# Patient Record
Sex: FEMALE | Race: WHITE | NOT HISPANIC OR LATINO | Employment: OTHER | URBAN - METROPOLITAN AREA
[De-identification: names, ages, dates, MRNs, and addresses within clinical notes are randomized per-mention and may not be internally consistent; named-entity substitution may affect disease eponyms.]

---

## 2017-01-18 ENCOUNTER — OFFICE VISIT (OUTPATIENT)
Dept: INTERNAL MEDICINE | Facility: CLINIC | Age: 72
End: 2017-01-18
Payer: MEDICARE

## 2017-01-18 ENCOUNTER — LAB VISIT (OUTPATIENT)
Dept: LAB | Facility: HOSPITAL | Age: 72
End: 2017-01-18
Attending: FAMILY MEDICINE
Payer: MEDICARE

## 2017-01-18 VITALS
BODY MASS INDEX: 23.92 KG/M2 | TEMPERATURE: 98 F | WEIGHT: 141.56 LBS | DIASTOLIC BLOOD PRESSURE: 84 MMHG | SYSTOLIC BLOOD PRESSURE: 138 MMHG | HEART RATE: 74 BPM

## 2017-01-18 DIAGNOSIS — M79.2 NEUROPATHIC PAIN: ICD-10-CM

## 2017-01-18 DIAGNOSIS — Z12.11 COLON CANCER SCREENING: ICD-10-CM

## 2017-01-18 DIAGNOSIS — I10 ESSENTIAL HYPERTENSION: ICD-10-CM

## 2017-01-18 DIAGNOSIS — E55.9 VITAMIN D DEFICIENCY: ICD-10-CM

## 2017-01-18 DIAGNOSIS — E78.5 DYSLIPIDEMIA: Primary | ICD-10-CM

## 2017-01-18 DIAGNOSIS — M81.0 OSTEOPOROSIS: ICD-10-CM

## 2017-01-18 DIAGNOSIS — E03.4 HYPOTHYROIDISM DUE TO ACQUIRED ATROPHY OF THYROID: ICD-10-CM

## 2017-01-18 DIAGNOSIS — E78.5 DYSLIPIDEMIA: ICD-10-CM

## 2017-01-18 LAB
ALBUMIN SERPL BCP-MCNC: 4.1 G/DL
ALP SERPL-CCNC: 67 U/L
ALT SERPL W/O P-5'-P-CCNC: 23 U/L
ANION GAP SERPL CALC-SCNC: 8 MMOL/L
AST SERPL-CCNC: 34 U/L
BASOPHILS # BLD AUTO: 0.02 K/UL
BASOPHILS NFR BLD: 0.3 %
BILIRUB SERPL-MCNC: 0.4 MG/DL
BUN SERPL-MCNC: 12 MG/DL
CALCIUM SERPL-MCNC: 10.2 MG/DL
CHLORIDE SERPL-SCNC: 104 MMOL/L
CHOLEST/HDLC SERPL: 2.7 {RATIO}
CO2 SERPL-SCNC: 30 MMOL/L
CREAT SERPL-MCNC: 0.9 MG/DL
DIFFERENTIAL METHOD: NORMAL
EOSINOPHIL # BLD AUTO: 0.2 K/UL
EOSINOPHIL NFR BLD: 1.9 %
ERYTHROCYTE [DISTWIDTH] IN BLOOD BY AUTOMATED COUNT: 13.1 %
EST. GFR  (AFRICAN AMERICAN): >60 ML/MIN/1.73 M^2
EST. GFR  (NON AFRICAN AMERICAN): >60 ML/MIN/1.73 M^2
GLUCOSE SERPL-MCNC: 79 MG/DL
HCT VFR BLD AUTO: 45 %
HDL/CHOLESTEROL RATIO: 36.5 %
HDLC SERPL-MCNC: 208 MG/DL
HDLC SERPL-MCNC: 76 MG/DL
HGB BLD-MCNC: 14.8 G/DL
LDLC SERPL CALC-MCNC: 111.8 MG/DL
LYMPHOCYTES # BLD AUTO: 2.3 K/UL
LYMPHOCYTES NFR BLD: 29.3 %
MCH RBC QN AUTO: 30.8 PG
MCHC RBC AUTO-ENTMCNC: 32.9 %
MCV RBC AUTO: 94 FL
MONOCYTES # BLD AUTO: 0.7 K/UL
MONOCYTES NFR BLD: 8.2 %
NEUTROPHILS # BLD AUTO: 4.7 K/UL
NEUTROPHILS NFR BLD: 60 %
NONHDLC SERPL-MCNC: 132 MG/DL
PLATELET # BLD AUTO: 251 K/UL
PMV BLD AUTO: 10.8 FL
POTASSIUM SERPL-SCNC: 3.9 MMOL/L
PROT SERPL-MCNC: 8 G/DL
RBC # BLD AUTO: 4.8 M/UL
SODIUM SERPL-SCNC: 142 MMOL/L
T4 FREE SERPL-MCNC: 1.16 NG/DL
TRIGL SERPL-MCNC: 101 MG/DL
TSH SERPL DL<=0.005 MIU/L-ACNC: 2.5 UIU/ML
WBC # BLD AUTO: 7.88 K/UL

## 2017-01-18 PROCEDURE — 80061 LIPID PANEL: CPT

## 2017-01-18 PROCEDURE — 84439 ASSAY OF FREE THYROXINE: CPT

## 2017-01-18 PROCEDURE — 99214 OFFICE O/P EST MOD 30 MIN: CPT | Mod: S$PBB,,, | Performed by: FAMILY MEDICINE

## 2017-01-18 PROCEDURE — 36415 COLL VENOUS BLD VENIPUNCTURE: CPT | Mod: PO

## 2017-01-18 PROCEDURE — 84443 ASSAY THYROID STIM HORMONE: CPT

## 2017-01-18 PROCEDURE — 85025 COMPLETE CBC W/AUTO DIFF WBC: CPT

## 2017-01-18 PROCEDURE — 80053 COMPREHEN METABOLIC PANEL: CPT

## 2017-01-18 PROCEDURE — 99999 PR PBB SHADOW E&M-EST. PATIENT-LVL III: CPT | Mod: PBBFAC,,, | Performed by: FAMILY MEDICINE

## 2017-01-18 RX ORDER — GABAPENTIN 100 MG/1
100 CAPSULE ORAL 3 TIMES DAILY
Qty: 90 CAPSULE | Refills: 0 | Status: SHIPPED | OUTPATIENT
Start: 2017-01-18 | End: 2018-01-31

## 2017-01-18 RX ORDER — METOPROLOL SUCCINATE 25 MG/1
12.5-25 TABLET, EXTENDED RELEASE ORAL DAILY
Qty: 90 TABLET | Refills: 3 | Status: SHIPPED | OUTPATIENT
Start: 2017-01-18 | End: 2018-08-01 | Stop reason: SDUPTHER

## 2017-01-18 RX ORDER — ALENDRONATE SODIUM 70 MG/1
70 TABLET ORAL
Qty: 12 TABLET | Refills: 2 | Status: SHIPPED | OUTPATIENT
Start: 2017-01-18 | End: 2018-01-31

## 2017-01-18 RX ORDER — PRAVASTATIN SODIUM 40 MG/1
40 TABLET ORAL DAILY
Qty: 90 TABLET | Refills: 3 | Status: SHIPPED | OUTPATIENT
Start: 2017-01-18 | End: 2017-07-19 | Stop reason: SDUPTHER

## 2017-01-18 NOTE — PROGRESS NOTES
Subjective:      Patient ID: Iliana Mariano is a 71 y.o. female.    Chief Complaint: Follow-up (chronic issues)    HPI Comments: Patient is here today for followup of multiple issues.    htn- stable with meds. On triamterene hctz 37.5-50 mg.  She has no problems with it.     Patient also has a history of hypothyroidism.  She is currently on 88 mcg.  Labs reviewed. Stable.     She did have an episode of herpes zoster back in September.  Still with some residual pain on the left C6 distribution.  Not currently taking anything at this time.  Is wanting to get shingles vaccine once his been a year since the episode outbreak. Interested in trying gabapentin for painful flares after sewing.     She has a history of vitamin D deficiency.  She's currently on over-the-counter supplementation.  Her insurance won't allow us to check it more than once per year.      Hyperlipidemia-currently controlled with statin.  Willing to do blood work today.    Balance isn't as good with turning quick. Working on this. Staying active.               Lab Results   Component Value Date    WBC 7.88 01/18/2017    HGB 14.8 01/18/2017    HCT 45.0 01/18/2017     01/18/2017    CHOL 208 (H) 01/18/2017    TRIG 101 01/18/2017    HDL 76 (H) 01/18/2017    ALT 23 01/18/2017    AST 34 01/18/2017     01/18/2017    K 3.9 01/18/2017     01/18/2017    CREATININE 0.9 01/18/2017    BUN 12 01/18/2017    CO2 30 (H) 01/18/2017    TSH 2.500 01/18/2017       Review of Systems   Constitutional: Negative for chills, fatigue and fever.   HENT: Negative for ear pain and trouble swallowing.    Eyes: Negative for pain and visual disturbance.   Respiratory: Negative for cough and shortness of breath.    Cardiovascular: Negative for chest pain and leg swelling.   Gastrointestinal: Negative for abdominal pain, blood in stool, nausea and vomiting.   Endocrine: Negative for cold intolerance and heat intolerance.   Genitourinary: Negative for dysuria and  frequency.   Musculoskeletal: Positive for arthralgias. Negative for joint swelling, myalgias and neck pain.   Skin: Negative for color change and rash.   Neurological: Negative for dizziness, weakness, light-headedness and headaches.   Psychiatric/Behavioral: Negative for behavioral problems and sleep disturbance. The patient is not nervous/anxious.      Objective:     Physical Exam   Constitutional: She is oriented to person, place, and time. She appears well-developed and well-nourished.   HENT:   Head: Normocephalic and atraumatic.   Right Ear: External ear normal.   Left Ear: External ear normal.   Mouth/Throat: Oropharynx is clear and moist.   Eyes: EOM are normal.   Neck: Normal range of motion. Neck supple. No thyromegaly present.   Cardiovascular: Normal rate and regular rhythm.  Exam reveals no gallop and no friction rub.    No murmur heard.  Pulmonary/Chest: Effort normal. No respiratory distress. She has no wheezes. She has no rales.   Abdominal: Soft. Bowel sounds are normal. She exhibits no distension. There is no tenderness. There is no rebound.   Musculoskeletal: Normal range of motion. She exhibits no edema.   Lymphadenopathy:     She has no cervical adenopathy.   Neurological: She is alert and oriented to person, place, and time.   Skin: Skin is warm and dry. No rash noted.   Psychiatric: She has a normal mood and affect. Her behavior is normal. Judgment and thought content normal.   Vitals reviewed.    Assessment:     1. Dyslipidemia    2. Hypothyroidism due to acquired atrophy of thyroid    3. Vitamin D deficiency    4. Essential hypertension    5. Osteoporosis    6. Colon cancer screening    7. Neuropathic pain      Plan:   Iliana was seen today for follow-up.    Diagnoses and all orders for this visit:    Dyslipidemia- - labs ordered. Discussed Health Maintenance issues.     -     pravastatin (PRAVACHOL) 40 MG tablet; Take 1 tablet (40 mg total) by mouth once daily.  -     TSH; Future  -      T4, free; Future  -     Lipid panel; Future  -     Comprehensive metabolic panel; Future  -     CBC auto differential; Future    Hypothyroidism due to acquired atrophy of thyroid- - stable, Continue with current medications and interventions. Labs reviewed.     -     TSH; Future  -     T4, free; Future  -     Lipid panel; Future  -     Comprehensive metabolic panel; Future  -     CBC auto differential; Future    Vitamin D deficiency- stable, Continue with current medications and interventions. Labs reviewed.   -     TSH; Future  -     T4, free; Future  -     Lipid panel; Future  -     Comprehensive metabolic panel; Future  -     CBC auto differential; Future    Essential hypertension- stable, Continue with current medications and interventions. Labs reviewed.   -     TSH; Future  -     T4, free; Future  -     Lipid panel; Future  -     Comprehensive metabolic panel; Future  -     CBC auto differential; Future    Osteoporosis- stable, Continue with current medications and interventions. Cont with activity. .   -     alendronate (FOSAMAX) 70 MG tablet; Take 1 tablet (70 mg total) by mouth every 7 days.  -     TSH; Future  -     T4, free; Future  -     Lipid panel; Future  -     Comprehensive metabolic panel; Future  -     CBC auto differential; Future    Colon cancer screening- schedule  -     Case request GI: COLONOSCOPY  -     TSH; Future  -     T4, free; Future  -     Lipid panel; Future  -     Comprehensive metabolic panel; Future  -     CBC auto differential; Future    Neuropathic pain  Comments:  in C6 distribution. ok for prn gabapentin.   Orders:  -     TSH; Future  -     T4, free; Future  -     Lipid panel; Future  -     Comprehensive metabolic panel; Future  -     CBC auto differential; Future    Other orders  -     Influenza - Quadrivalent (3 years & older)  -     metoprolol succinate (TOPROL-XL) 25 MG 24 hr tablet; Take 0.5-1 tablets (12.5-25 mg total) by mouth once daily. 1/2 to 1 tab daily  -      gabapentin (NEURONTIN) 100 MG capsule; Take 1 capsule (100 mg total) by mouth 3 (three) times daily.            Return in about 6 months (around 7/18/2017) for chronic issues.

## 2017-01-18 NOTE — MR AVS SNAPSHOT
[facility]  [address]  [place] [address]  Phone: [phone]  Fax: [phone]                  Iliana Mariano   2017 11:20 AM   Office Visit    Description:  Female : 1945   Provider:  Breonna Borja MD   [facility]           Reason for Visit     Follow-up           Diagnoses this Visit        Comments    Dyslipidemia    -  Primary     Hypothyroidism due to acquired atrophy of thyroid         Vitamin D deficiency         Essential hypertension         Osteoporosis         Colon cancer screening         Neuropathic pain     in C6 distribution. ok for prn gabapentin.            To Do List           Future Appointments        Provider Department Dept Phone    2017 1:40 PM LABORATORY, [place] [facility] [place] [phone]    2017 10:20 AM Breonna Borja MD [facility] [phone]      Goals (5 Years of Data)     None      Follow-Up and Disposition     Return in about 6 months (around 2017) for chronic issues.       These Medications        Disp Refills Start End    alendronate (FOSAMAX) 70 MG tablet 12 tablet 2 2017    Take 1 tablet (70 mg total) by mouth every 7 days. - Oral    Pharmacy: [facility] [address] [place] [address] Ph #: [phone]       metoprolol succinate (TOPROL-XL) 25 MG 24 hr tablet 90 tablet 3 2017     Take 0.5-1 tablets (12.5-25 mg total) by mouth once daily. 1/2 to 1 tab daily - Oral    Pharmacy: [facility] [address] [place] [address] Ph #: [phone]       pravastatin (PRAVACHOL) 40 MG tablet 90 tablet 3 2017     Take 1 tablet (40 mg total) by mouth once daily. - Oral    Pharmacy: [facility] [address] [place] [address] Ph #: [phone]       gabapentin (NEURONTIN) 100 MG capsule 90 capsule 0 2017    Take 1 capsule (100 mg total) by mouth 3 (three)  times daily. - Oral    Pharmacy: [facility] - [place], [place] - [address] [facility] #: [phone]         [facility] On Call     [facility] On Call Nurse Care Line - 24/7 Assistance  Registered nurses in the [facility] On Call Center provide clinical advisement, health education, appointment booking, and other advisory services.  Call for this free service at [phone].             Medications           Message regarding Medications     Verify the changes and/or additions to your medication regime listed below are the same as discussed with your clinician today.  If any of these changes or additions are incorrect, please notify your healthcare provider.        START taking these NEW medications        Refills    gabapentin (NEURONTIN) 100 MG capsule 0    Sig: Take 1 capsule (100 mg total) by mouth 3 (three) times daily.    Class: Normal    Route: Oral      STOP taking these medications     fluticasone (FLONASE) 50 mcg/actuation nasal spray 1 spray by Each Nare route once daily.           Verify that the below list of medications is an accurate representation of the medications you are currently taking.  If none reported, the list may be blank. If incorrect, please contact your healthcare provider. Carry this list with you in case of emergency.           Current Medications     alendronate (FOSAMAX) 70 MG tablet Take 1 tablet (70 mg total) by mouth every 7 days.    aspirin (ECOTRIN) 81 MG EC tablet Take 81 mg by mouth once daily.    B-complex with vitamin C (Z-BEC OR EQUIV) tablet Take 1 tablet by mouth once daily.    diclofenac sodium 1 % Gel Apply 2 g topically once daily.    dorzolamide (TRUSOPT) 2 % ophthalmic solution     dorzolamide-timolol 2-0.5% (COSOPT) 2-0.5 % ophthalmic solution Place 1 drop into both eyes every 12 (twelve) hours.    ergocalciferol, vitamin D2, 1,000 unit Cap Take 1,000 Units by mouth.    latanoprost 0.005 % ophthalmic solution Place 1 drop into both eyes every evening.     levothyroxine (SYNTHROID) 88 MCG tablet Take 1 tablet (88 mcg total) by mouth once daily.    LORATADINE (CLARITIN ORAL) Take by mouth as needed.    metoprolol succinate (TOPROL-XL) 25 MG 24 hr tablet Take 0.5-1 tablets (12.5-25 mg total) by mouth once daily. 1/2 to 1 tab daily    nitroGLYCERIN (NITROSTAT) 0.4 MG SL tablet Place 0.4 mg under the tongue every 5 (five) minutes as needed.    pantoprazole (PROTONIX) 40 MG tablet Take 1 tablet (40 mg total) by mouth once daily.    pravastatin (PRAVACHOL) 40 MG tablet Take 1 tablet (40 mg total) by mouth once daily.    sodium,potassium,&mag sulfates (SUPREP BOWEL PREP KIT) 17.5-3.13-1.6 gram SolR Take 1 kit by mouth as directed.    triamterene-hydrochlorothiazide 50-25 mg (DYAZIDE) 50-25 mg Cap Take 1 capsule by mouth once daily.    gabapentin (NEURONTIN) 100 MG capsule Take 1 capsule (100 mg total) by mouth 3 (three) times daily.    triamcinolone acetonide 0.1% (KENALOG) 0.1 % cream Apply topically 4 (four) times daily. Apply to skin           Clinical Reference Information           Vital Signs - Last Recorded  Most recent update: 1/18/2017 11:39 AM by Melissa Laird LPN    BP Pulse Temp Wt BMI    138/84 74 97.7 °F (36.5 °C) (Tympanic) 64.2 kg (141 lb 8.6 oz) 23.92 kg/m2      Blood Pressure          Most Recent Value    BP  138/84      Allergies as of 1/18/2017     Sulfa (Sulfonamide Antibiotics)      Immunizations Administered on Date of Encounter - 1/18/2017     Name Date Dose VIS Date Route    influenza - Quadrivalent 1/18/2017 0.5 mL 8/7/2015 Intramuscular      Orders Placed During Today's Visit      Normal Orders This Visit    Case request GI: COLONOSCOPY     Influenza - Quadrivalent (3 years & older)     Future Labs/Procedures Expected by Expires    CBC auto differential  1/18/2017 3/19/2018    Comprehensive metabolic panel  1/18/2017 3/19/2018    Lipid panel  1/18/2017 3/19/2018    T4, free  1/18/2017 3/19/2018    TSH  1/18/2017 3/19/2018

## 2017-02-10 ENCOUNTER — SURGERY (OUTPATIENT)
Age: 72
End: 2017-02-10

## 2017-02-10 ENCOUNTER — HOSPITAL ENCOUNTER (OUTPATIENT)
Facility: HOSPITAL | Age: 72
Discharge: HOME | End: 2017-02-10
Attending: INTERNAL MEDICINE | Admitting: INTERNAL MEDICINE
Payer: MEDICARE

## 2017-02-10 ENCOUNTER — ANESTHESIA EVENT (OUTPATIENT)
Dept: ENDOSCOPY | Facility: HOSPITAL | Age: 72
End: 2017-02-10
Payer: MEDICARE

## 2017-02-10 ENCOUNTER — ANESTHESIA (OUTPATIENT)
Dept: ENDOSCOPY | Facility: HOSPITAL | Age: 72
End: 2017-02-10
Payer: MEDICARE

## 2017-02-10 VITALS
HEART RATE: 91 BPM | TEMPERATURE: 98 F | DIASTOLIC BLOOD PRESSURE: 93 MMHG | HEIGHT: 66 IN | SYSTOLIC BLOOD PRESSURE: 161 MMHG | BODY MASS INDEX: 22.34 KG/M2 | RESPIRATION RATE: 8 BRPM | RESPIRATION RATE: 18 BRPM | WEIGHT: 139 LBS | OXYGEN SATURATION: 98 %

## 2017-02-10 DIAGNOSIS — Z12.11 SCREEN FOR COLON CANCER: ICD-10-CM

## 2017-02-10 PROCEDURE — 25000003 PHARM REV CODE 250: Performed by: NURSE ANESTHETIST, CERTIFIED REGISTERED

## 2017-02-10 PROCEDURE — 25000003 PHARM REV CODE 250: Performed by: INTERNAL MEDICINE

## 2017-02-10 PROCEDURE — G0121 COLON CA SCRN NOT HI RSK IND: HCPCS | Mod: ,,, | Performed by: INTERNAL MEDICINE

## 2017-02-10 PROCEDURE — 63600175 PHARM REV CODE 636 W HCPCS: Performed by: NURSE ANESTHETIST, CERTIFIED REGISTERED

## 2017-02-10 PROCEDURE — G0121 COLON CA SCRN NOT HI RSK IND: HCPCS | Performed by: INTERNAL MEDICINE

## 2017-02-10 PROCEDURE — 37000008 HC ANESTHESIA 1ST 15 MINUTES: Performed by: INTERNAL MEDICINE

## 2017-02-10 PROCEDURE — 37000009 HC ANESTHESIA EA ADD 15 MINS: Performed by: INTERNAL MEDICINE

## 2017-02-10 RX ORDER — SODIUM CHLORIDE, SODIUM LACTATE, POTASSIUM CHLORIDE, CALCIUM CHLORIDE 600; 310; 30; 20 MG/100ML; MG/100ML; MG/100ML; MG/100ML
INJECTION, SOLUTION INTRAVENOUS CONTINUOUS
Status: DISCONTINUED | OUTPATIENT
Start: 2017-02-10 | End: 2017-02-10 | Stop reason: HOSPADM

## 2017-02-10 RX ORDER — PROPOFOL 10 MG/ML
INJECTION, EMULSION INTRAVENOUS
Status: DISCONTINUED | OUTPATIENT
Start: 2017-02-10 | End: 2017-02-10

## 2017-02-10 RX ORDER — SODIUM CHLORIDE, SODIUM LACTATE, POTASSIUM CHLORIDE, CALCIUM CHLORIDE 600; 310; 30; 20 MG/100ML; MG/100ML; MG/100ML; MG/100ML
INJECTION, SOLUTION INTRAVENOUS CONTINUOUS PRN
Status: DISCONTINUED | OUTPATIENT
Start: 2017-02-10 | End: 2017-02-10

## 2017-02-10 RX ORDER — LIDOCAINE HCL/PF 100 MG/5ML
SYRINGE (ML) INTRAVENOUS
Status: DISCONTINUED | OUTPATIENT
Start: 2017-02-10 | End: 2017-02-10

## 2017-02-10 RX ADMIN — SODIUM CHLORIDE, SODIUM LACTATE, POTASSIUM CHLORIDE, AND CALCIUM CHLORIDE: 600; 310; 30; 20 INJECTION, SOLUTION INTRAVENOUS at 09:02

## 2017-02-10 RX ADMIN — LIDOCAINE HYDROCHLORIDE 60 MG: 20 INJECTION, SOLUTION INTRAVENOUS at 09:02

## 2017-02-10 RX ADMIN — PROPOFOL 100 MG: 10 INJECTION, EMULSION INTRAVENOUS at 09:02

## 2017-02-10 RX ADMIN — SODIUM CHLORIDE, SODIUM LACTATE, POTASSIUM CHLORIDE, AND CALCIUM CHLORIDE: .6; .31; .03; .02 INJECTION, SOLUTION INTRAVENOUS at 08:02

## 2017-02-10 RX ADMIN — PROPOFOL 30 MG: 10 INJECTION, EMULSION INTRAVENOUS at 09:02

## 2017-02-10 NOTE — ANESTHESIA PREPROCEDURE EVALUATION
02/10/2017  Iliana Mariano is a 71 y.o., female.    [facility] Pre-op Assessment    I have reviewed the Patient Summary Reports.    I have reviewed the Nursing Notes.   I have reviewed the Medications.     Review of Systems  Anesthesia Hx:  No problems with previous Anesthesia    Social:  Non-Smoker, No Alcohol Use    Hematology/Oncology:  Hematology Normal   Oncology Normal     Cardiovascular:   Exercise tolerance: good Hypertension, well controlled hyperlipidemia Cardiac stress test over 3 years ago due to an abnormal EKG with normal results.   Pulmonary:  Pulmonary Normal    Renal/:  Renal/ Normal     Hepatic/GI:   Bowel Prep. Hiatal Hernia, GERD, well controlled 0500 last drink of fluid.   Musculoskeletal:   Arthritis     Neurological:   Neuromuscular Disease,    Endocrine:   Hypothyroidism    Dermatological:  Skin Normal    Psych:  Psychiatric Normal           Physical Exam  General:  Well nourished    Airway/Jaw/Neck:  Airway Findings: Mallampati: II                Anesthesia Plan  Type of Anesthesia, risks & benefits discussed:  Anesthesia Type:  MAC  Patient's Preference:   Intra-op Monitoring Plan:   Intra-op Monitoring Plan Comments:   Post Op Pain Control Plan:   Post Op Pain Control Plan Comments:   Induction:   IV  Beta Blocker:  Patient is on a Beta-Blocker and has received one dose within the past 24 hours (No further documentation required).       Informed Consent: Patient understands risks and agrees with Anesthesia plan.  Questions answered. Anesthesia consent signed with patient.  ASA Score: 2     Day of Surgery Review of History & Physical: I have interviewed and examined the patient. I have reviewed the patient's H&P dated: 02/10/17. There are no significant changes.  H&P update referred to the surgeon.

## 2017-02-10 NOTE — IP AVS SNAPSHOT
[address] [place] [address]           Patient Discharge Instructions     Our goal is to set you up for success. This packet includes information on your condition, medications, and your home care. It will help you to care for yourself so you don't get sicker and need to go back to the hospital.     Please ask your nurse if you have any questions.        There are many details to remember when preparing to leave the hospital. Here is what you will need to do:    1. Take your medicine. If you are prescribed medications, review your Medication List in the following pages. You may have new medications to  at the pharmacy and others that you'll need to stop taking. Review the instructions for how and when to take your medications. Talk with your doctor or nurses if you are unsure of what to do.     2. Go to your follow-up appointments. Specific follow-up information is listed in the following pages. Your may be contacted by a transition nurse or clinical provider about future appointments. Be sure we have all of the phone numbers to reach you, if needed. Please contact your provider's office if you are unable to make an appointment.     3. Watch for warning signs. Your doctor or nurse will give you detailed warning signs to watch for and when to call for assistance. These instructions may also include educational information about your condition. If you experience any of warning signs to your health, call your doctor.               ** Verify the list of medication(s) below is accurate and up to date. Carry this with you in case of emergency. If your medications have changed, please notify your healthcare provider.             Medication List      CONTINUE taking these medications        Additional Info                      alendronate 70 MG tablet   Commonly known as:  FOSAMAX   Quantity:  12 tablet   Refills:  2   Dose:  70 mg    Instructions:  Take 1 tablet (70  mg total) by mouth every 7 days.     Begin Date    AM    Noon    PM    Bedtime       aspirin 81 MG EC tablet   Commonly known as:  ECOTRIN   Refills:  0   Dose:  81 mg   Indications:  Myocardial Reinfarction Prevention    Instructions:  Take 81 mg by mouth once daily.     Begin Date    AM    Noon    PM    Bedtime       B-complex with vitamin C tablet   Commonly known as:  Z-Bec or Equiv   Refills:  0   Dose:  1 tablet    Instructions:  Take 1 tablet by mouth once daily.     Begin Date    AM    Noon    PM    Bedtime       CLARITIN ORAL   Refills:  0    Instructions:  Take by mouth as needed.     Begin Date    AM    Noon    PM    Bedtime       diclofenac sodium 1 % Gel   Refills:  0   Dose:  2 g    Instructions:  Apply 2 g topically once daily.     Begin Date    AM    Noon    PM    Bedtime       dorzolamide 2 % ophthalmic solution   Commonly known as:  TRUSOPT   Refills:  0      Begin Date    AM    Noon    PM    Bedtime       dorzolamide-timolol 2-0.5% 22.3-6.8 mg/mL ophthalmic solution   Commonly known as:  COSOPT   Quantity:  1 Bottle   Refills:  6   Dose:  1 drop    Instructions:  Place 1 drop into both eyes every 12 (twelve) hours.     Begin Date    AM    Noon    PM    Bedtime       ergocalciferol (vitamin D2) 1,000 unit Cap   Refills:  0   Dose:  1000 Units    Instructions:  Take 1,000 Units by mouth.     Begin Date    AM    Noon    PM    Bedtime       gabapentin 100 MG capsule   Commonly known as:  NEURONTIN   Quantity:  90 capsule   Refills:  0   Dose:  100 mg    Instructions:  Take 1 capsule (100 mg total) by mouth 3 (three) times daily.     Begin Date    AM    Noon    PM    Bedtime       latanoprost 0.005 % ophthalmic solution   Quantity:  1 Bottle   Refills:  6   Dose:  1 drop    Instructions:  Place 1 drop into both eyes every evening.     Begin Date    AM    Noon    PM    Bedtime       levothyroxine 88 MCG tablet   Commonly known as:  SYNTHROID   Quantity:  90 tablet   Refills:  3   Dose:  88 mcg    Indications:  hypothyroidism    Instructions:  Take 1 tablet (88 mcg total) by mouth once daily.     Begin Date    AM    Noon    PM    Bedtime       metoprolol succinate 25 MG 24 hr tablet   Commonly known as:  TOPROL-XL   Quantity:  90 tablet   Refills:  3   Dose:  12.5-25 mg    Instructions:  Take 0.5-1 tablets (12.5-25 mg total) by mouth once daily. 1/2 to 1 tab daily     Begin Date    AM    Noon    PM    Bedtime       nitroGLYCERIN 0.4 MG SL tablet   Commonly known as:  NITROSTAT   Refills:  0   Dose:  0.4 mg   Indications:  Myocardial Infarction    Instructions:  Place 0.4 mg under the tongue every 5 (five) minutes as needed.     Begin Date    AM    Noon    PM    Bedtime       pantoprazole 40 MG tablet   Commonly known as:  PROTONIX   Quantity:  90 tablet   Refills:  3   Dose:  40 mg   Indications:  Helicobacter Pylori Gastritis    Instructions:  Take 1 tablet (40 mg total) by mouth once daily.     Begin Date    AM    Noon    PM    Bedtime       pravastatin 40 MG tablet   Commonly known as:  PRAVACHOL   Quantity:  90 tablet   Refills:  3   Dose:  40 mg   Indications:  mixed hyperlipidemia    Instructions:  Take 1 tablet (40 mg total) by mouth once daily.     Begin Date    AM    Noon    PM    Bedtime       triamcinolone acetonide 0.1% 0.1 % cream   Commonly known as:  KENALOG   Quantity:  80 g   Refills:  1    Instructions:  Apply topically 4 (four) times daily. Apply to skin     Begin Date    AM    Noon    PM    Bedtime       triamterene-hydrochlorothiazide 50-25 mg 50-25 mg Cap   Commonly known as:  DYAZIDE   Quantity:  90 capsule   Refills:  3   Dose:  1 capsule    Instructions:  Take 1 capsule by mouth once daily.     Begin Date    AM    Noon    PM    Bedtime         STOP taking these medications     sodium,potassium,mag sulfates 17.5-3.13-1.6 gram Solr   Commonly known as:  SUPREP BOWEL PREP KIT                  Please bring to all follow up appointments:    1. A copy of your discharge  instructions.  2. All medicines you are currently taking in their original bottles.  3. Identification and insurance card.    Please arrive 15 minutes ahead of scheduled appointment time.    Please call 24 hours in advance if you must reschedule your appointment and/or time.        Your Scheduled Appointments     Jul 18, 2017 10:20 AM CDT   Established Patient Visit with Bre[facility] Borja MD   [facility]    [address]  [place] [address] [phone]              Follow-up Information     Follow up with Breonna Borja MD.    Specialty:  Family Medicine    Contact information:    [address]  [address]  [place] [address] [phone]          Discharge Instructions     Future Orders    Activity as tolerated     Diet general     Questions:    Total calories:      Fat restriction, if any:      Protein restriction, if any:      Na restriction, if any:      Fluid restriction:      Additional restrictions:          Discharge Instructions         Colonoscopy     A camera attached to a flexible tube with a viewing lens is used to take video pictures.     Colonoscopy is a test to view the inside of your lower digestive tract (colon and rectum). Sometimes it can show the last part of the small intestine (ileum). During the test, small pieces of tissue may be removed for testing. This is called a biopsy. Small growths, such as polyps, may also be removed.   Why is colonoscopy done?  The test is done to help look for colon cancer. And it can help find the source of abdominal pain, bleeding, and changes in bowel habits. It may be needed once a year, depending on factors such as your:  · Age  · Health history  · Family health history  · Symptoms  · Results from any prior colonoscopy  Risks and possible complications  These include:  · Bleeding               · A puncture or tear in the colon   · Risks of anesthesia  · A cancer lesion not being seen  Getting ready   To  prepare for the test:  · Talk with your healthcare provider about the risks of the test (see below). Also ask your healthcare provider about alternatives to the test.  · Tell your healthcare provider about any medicines you take. Also tell him or her about any health conditions you may have.  · Make sure your rectum and colon are empty for the test. Follow the diet and bowel prep instructions exactly. If you dont, the test may need to be rescheduled.  · Plan for a friend or family member to drive you home after the test.     Colonoscopy provides an inside view of the entire colon.     You may discuss the results with your doctor right away or at a future visit.  During the test   The test is usually done in the hospital on an outpatient basis. This means you go home the same day. The procedure takes about 30 minutes. During that time:  · You are given relaxing (sedating) medicine through an IV line. You may be drowsy, or fully asleep.  · The healthcare provider will first give you a physical exam to check for anal and rectal problems.  · Then the anus is lubricated and the scope inserted.  · If you are awake, you may have a feeling similar to needing to have a bowel movement. You may also feel pressure as air is pumped into the colon. Its OK to pass gas during the procedure.  · Biopsy, polyp removal, or other treatments may be done during the test.  After the test   You may have gas right after the test. It can help to try to pass it to help prevent later bloating. Your healthcare provider may discuss the results with you right away. Or you may need to schedule a follow-up visit to talk about the results. After the test, you can go back to your normal eating and other activities. You may be tired from the sedation and need to rest for a few hours.  Date Last Reviewed: 11/1/2016  © 2905-7502 [employer]. [address] [place] [address], [place] [address]. All rights reserved. This information is not intended  as a substitute for professional medical care. Always follow your healthcare professional's instructions.        Hemorrhoids    Hemorrhoids are swollen and inflamed veins inside the rectum and near the anus. The rectum is the last several inches of the colon. The anus is the passage between the rectum and the outside of the body.  Causes  The veins can become swollen due to increased pressure in them. This is most often caused by:  · Chronic constipation or diarrhea  · Straining when having a bowel movement  · Sitting too long on the toilet  · A low-fiber diet  · Pregnancy  Symptoms  · Bleeding from the rectum (this may be noticeable after bowel movements)  · Lump near the anus  · Itching around the anus  · Pain around the anus  There are different types of hemorrhoids. Depending on the type you have and the severity, you may be able to treat yourself at home. In some cases, a procedure may be the best treatment option. Your healthcare provider can tell you more about this, if needed.  Home care  General care  · To get relief from pain or itching, try:  ¨ Topical products. Your healthcare provider may prescribe or recommend creams, ointments, or pads that can be applied to the hemorrhoid. Use these exactly as directed.  ¨ Medicines. Your healthcare provider may recommend stool softeners, suppositories, or laxatives to help manage constipation. Use these exactly as directed.  ¨ Sitz baths. A sitz bath involves sitting in a few inches of warm bath water. Be careful not to make the water so hot that you burn yourself--test it before sitting in it. Soak for about 10 to 15 minutes a few times a day. This may help relieve pain.  Tips to help prevent hemorrhoids  · Eat more fiber. Fiber adds bulk to stool and absorbs water as it moves through your colon. This makes stool softer and easier to pass.  ¨ Increase the fiber in your diet with more fiber-rich foods. These include fresh fruit, vegetables, and whole grains.  ¨ Take  a fiber supplement or bulking agent, if advised to by your provider. These include products such as psyllium or methylcellulose.  · Drink plenty of water, if directed to by your provider. This can help keep stool soft.  · Be more active. Frequent exercise aids digestion and helps prevent constipation. It may also help make bowel movements more regular.  · Dont strain during bowel movements. This can make hemorrhoids more likely. Also, dont sit on the toilet for long periods of time.  Follow-up care  Follow up with your healthcare provider, or as advised. If a culture or imaging tests were done, you will be notified of the results when they are ready. This may take a few days or longer.  When to seek medical advice  Call your healthcare provider right away if any of these occur:  · Increased bleeding from the rectum  · Increased pain around the rectum or anus  · Weakness or dizziness  Call 911  Call 911 or return to the emergency department right away if any of these occur:  · Trouble breathing or swallowing  · Fainting or loss of consciousness  · Unusually fast heart rate  · Vomiting blood  · Large amounts of blood in stool  Date Last Reviewed: 6/22/2015  © 6141-3646 [employer]. [address] [place] [address]. All rights reserved. This information is not intended as a substitute for professional medical care. Always follow your healthcare professional's instructions.            Admission Information     Date & Time Provider Department CSN    2/10/2017  8:17 AM Alonzo Waterman MD [facility] -  [id number]      Care Providers     Provider Role Specialty Primary office phone    Alonzo Waterman MD Attending Provider Gastroenterology [phone]    Alonzo Waterman MD Surgeon  Gastroenterology [phone]      Your Vitals Were     BP Pulse Temp Resp Height Weight    107/82 (BP Location: Left arm, Patient Position: Lying, BP Method: Automatic) 103 97.9 °F (36.6 °C)  "(Oral) 16 5' 5.5" (1.664 m) 63 kg (139 lb)    SpO2 BMI             95% 22.78 kg/m2         Recent Lab Values     No lab values to display.      Allergies as of 2/10/2017        Reactions    Sulfa (Sulfonamide Antibiotics) Hives      [facility]     [facility] Nurse Care Line - 24/7 Assistance  Unless otherwise directed by your provider, please contact [facility], our nurse care line that is available for 24/7 assistance.     Registered nurses in the [facility] provide clinical advisement, health education, appointment booking, and other advisory services.  Call for this free service at [phone].        Advance Directives     An advance directive is a document which, in the event you are no longer able to make decisions for yourself, tells your healthcare team what kind of treatment you do or do not want to receive, or who you would like to make those decisions for you.  If you do not currently have an advance directive, [facility] encourages you to create one.  For more information call:  [phone]), [phone] ([phone]),  or log on to [email or web address].        Language Assistance Services     ATTENTION: Language assistance services are available, free of charge. Please call [phone].      ATENCIÓN: Si habla español, tiene a palmer disposición servicios gratuitos de asistencia lingüística. Llame al [phone].     [facility] Ý: N?u b?n nói Ti?ng Vi?t, có các d?ch v? h? tr? ngôn ng? mi?n phí dành cho b?n. G?i s? [phone].         [facility] -  complies with applicable Federal civil rights laws and does not discriminate on the basis of race, color, national origin, age, disability, or sex.        "

## 2017-02-10 NOTE — BRIEF OP NOTE
[facility] -   Brief Operative Note     SUMMARY     Surgery Date: 2/10/2017     Surgeon(s) and Role:     * Alonzo Waterman MD - Primary    Assisting Surgeon: None    Pre-op Diagnosis:  Colon cancer screening [Z12.11]    Post-op Diagnosis:  Post-Op Diagnosis Codes:     * Colon cancer screening [Z12.11]    Procedure(s) (LRB):  COLONOSCOPY (N/A)    Anesthesia: Choice    Description of the findings of the procedure: Procedure completed. See Procedure note for details.     Findings/Key Components:  Procedure completed. See Procedure note for details.     Prosthetic/Devices: None    Estimated Blood Loss: None         Specimens:   Specimen     None          Discharge Note    SUMMARY     Admit Date: 2/10/2017    Discharge Date and Time: 2/10/2017    Hospital Course (synopsis of major diagnoses, care, treatment, and services provided during the course of the hospital stay): Procedure completed. See Procedure note for details.     Final Diagnosis: Post-Op Diagnosis Codes:     * Colon cancer screening [Z12.11]    Disposition: Discharge home when discharge criteria met.    Follow Up/Patient Instructions: With primary care physician as previously scheduled.    Medications:  Reconciled Home Medications: Current Discharge Medication List      CONTINUE these medications which have NOT CHANGED    Details   alendronate (FOSAMAX) 70 MG tablet Take 1 tablet (70 mg total) by mouth every 7 days.  Qty: 12 tablet, Refills: 2    Associated Diagnoses: Osteoporosis      B-complex with vitamin C (Z-BEC OR EQUIV) tablet Take 1 tablet by mouth once daily.      diclofenac sodium 1 % Gel Apply 2 g topically once daily.      dorzolamide (TRUSOPT) 2 % ophthalmic solution       dorzolamide-timolol 2-0.5% (COSOPT) 2-0.5 % ophthalmic solution Place 1 drop into both eyes every 12 (twelve) hours.  Qty: 1 Bottle, Refills: 6    Associated Diagnoses: Primary open-angle glaucoma(365.11); Moderate stage glaucoma      ergocalciferol, vitamin  D2, 1,000 unit Cap Take 1,000 Units by mouth.      gabapentin (NEURONTIN) 100 MG capsule Take 1 capsule (100 mg total) by mouth 3 (three) times daily.  Qty: 90 capsule, Refills: 0      levothyroxine (SYNTHROID) 88 MCG tablet Take 1 tablet (88 mcg total) by mouth once daily.  Qty: 90 tablet, Refills: 3    Associated Diagnoses: Hypothyroidism due to acquired atrophy of thyroid      metoprolol succinate (TOPROL-XL) 25 MG 24 hr tablet Take 0.5-1 tablets (12.5-25 mg total) by mouth once daily. 1/2 to 1 tab daily  Qty: 90 tablet, Refills: 3      pantoprazole (PROTONIX) 40 MG tablet Take 1 tablet (40 mg total) by mouth once daily.  Qty: 90 tablet, Refills: 3      pravastatin (PRAVACHOL) 40 MG tablet Take 1 tablet (40 mg total) by mouth once daily.  Qty: 90 tablet, Refills: 3    Associated Diagnoses: Dyslipidemia      triamterene-hydrochlorothiazide 50-25 mg (DYAZIDE) 50-25 mg Cap Take 1 capsule by mouth once daily.  Qty: 90 capsule, Refills: 3      aspirin (ECOTRIN) 81 MG EC tablet Take 81 mg by mouth once daily.      latanoprost 0.005 % ophthalmic solution Place 1 drop into both eyes every evening.  Qty: 1 Bottle, Refills: 6    Associated Diagnoses: Primary open-angle glaucoma(365.11)      LORATADINE (CLARITIN ORAL) Take by mouth as needed.      nitroGLYCERIN (NITROSTAT) 0.4 MG SL tablet Place 0.4 mg under the tongue every 5 (five) minutes as needed.      triamcinolone acetonide 0.1% (KENALOG) 0.1 % cream Apply topically 4 (four) times daily. Apply to skin  Qty: 80 g, Refills: 1         STOP taking these medications       sodium,potassium,&mag sulfates (SUPREP BOWEL PREP KIT) 17.5-3.13-1.6 gram SolR Comments:   Reason for Stopping:               Discharge Procedure Orders  Diet general     Activity as tolerated       Follow-up Information     Follow up with Breonna Borja MD.    Specialty:  Family Medicine    Contact information:    [address]  [address]  [facility] [address] [phone]

## 2017-02-10 NOTE — ANESTHESIA POSTPROCEDURE EVALUATION
"Anesthesia Post Evaluation    Patient: Iliana Mariano    Procedure(s) Performed: Procedure(s) (LRB):  COLONOSCOPY (N/A)    Final Anesthesia Type: MAC  Patient location during evaluation: PACU  Patient participation: Yes- Able to Participate  Level of consciousness: awake and alert and oriented  Post-procedure vital signs: reviewed and stable  Pain management: adequate  Airway patency: patent  PONV status at discharge: No PONV  Anesthetic complications: no      Cardiovascular status: blood pressure returned to baseline  Respiratory status: unassisted, room air and spontaneous ventilation  Hydration status: euvolemic  Follow-up not needed.        Visit Vitals    /82 (BP Location: Left arm, Patient Position: Lying, BP Method: Automatic)    Pulse 103    Temp 36.6 °C (97.9 °F) (Oral)    Resp 16    Ht 5' 5.5" (1.664 m)    Wt 63 kg (139 lb)    SpO2 95%    Breastfeeding No    BMI 22.78 kg/m2       Pain/Raquel Score: Pain Assessment Performed: Yes (2/10/2017  9:36 AM)  Presence of Pain: non-verbal indicators absent (2/10/2017  9:36 AM)  Pain Rating Prior to Med Admin: 0 (2/10/2017  9:36 AM)  Pain Rating Post Med Admin: 0 (2/10/2017  9:36 AM)  Raquel Score: 9 (2/10/2017  9:36 AM)      "

## 2017-02-10 NOTE — ANESTHESIA RELEASE NOTE
"Anesthesia Release from PACU Note    Patient: Iliana Mariano    Procedure(s) Performed: Procedure(s) (LRB):  COLONOSCOPY (N/A)    Anesthesia type: MAC    Post pain: Adequate analgesia    Post assessment: no apparent anesthetic complications, tolerated procedure well and no evidence of recall    Last Vitals:   Visit Vitals    /82 (BP Location: Left arm, Patient Position: Lying, BP Method: Automatic)    Pulse 103    Temp 36.6 °C (97.9 °F) (Oral)    Resp 16    Ht 5' 5.5" (1.664 m)    Wt 63 kg (139 lb)    SpO2 95%    Breastfeeding No    BMI 22.78 kg/m2       Post vital signs: stable    Level of consciousness: awake    Nausea/Vomiting: no nausea/no vomiting    Complications: none    Airway Patency: patent    Respiratory: unassisted, spontaneous ventilation, room air    Cardiovascular: stable    Hydration: euvolemic  "

## 2017-02-10 NOTE — H&P
Short Stay Endoscopy History and Physical    PCP - Breonna Borja MD    Procedure - Colonoscopy  ASA - II  Mallampati - per anesthesia  History of Anesthesia problems - no  Family history Anesthesia problems -  no     HPI:  This is a 71 y.o. female here for evaluation of :  Screening for colon cancer    Average Risk Screening:Yes  Family history of colon cancer: No  History of polyps: No  Anemia: No  Blood in stools: No  Diarrhea: No  Abdominal Pain: No    Review of Systems:  CONSTITUTIONAL: Denies weight change,  fatigue, fevers, chills, night sweats.  CARDIOVASCULAR: Denies chest pain, shortness of breath, orthopnea and edema.  RESPIRATORY: Denies cough, hemoptysis, dyspnea, and wheezing.  GI: See HPI.    Medical History:  Past Medical History   Diagnosis Date    Gastritis     GERD (gastroesophageal reflux disease)     Glaucoma suspect with open angle 5-14-13    Hemorrhoid     Hiatal hernia     Hypertension 6/7/2013    Osteoarthritis 2011    Osteoporosis, unspecified     Other and unspecified hyperlipidemia     Unspecified essential hypertension     Unspecified hypothyroidism     Unspecified vitamin D deficiency        Surgical History:   Past Surgical History   Procedure Laterality Date    Slt od  6-6-13    Slt os  8/15/13    Hysterectomy         Family History:   Family History   Problem Relation Age of Onset    Glaucoma Mother     Cataracts Mother     Diabetes Mother     Hypertension Mother     Stroke Mother     Cataracts Father     Diabetes Sister     Hypertension Sister     Diabetes Brother     Diabetes Maternal Grandfather     Blindness Maternal Grandfather     Thyroid disease Paternal Grandmother     Strabismus Neg Hx     Retinal detachment Neg Hx     Macular degeneration Neg Hx     Amblyopia Neg Hx        Social History:   Social History   Substance Use Topics    Smoking status: Never Smoker    Smokeless tobacco: None    Alcohol use No       Allergies:  Reviewed.    Medications:  No current facility-administered medications on file prior to encounter.      Current Outpatient Prescriptions on File Prior to Encounter   Medication Sig Dispense Refill    alendronate (FOSAMAX) 70 MG tablet Take 1 tablet (70 mg total) by mouth every 7 days. 12 tablet 2    B-complex with vitamin C (Z-BEC OR EQUIV) tablet Take 1 tablet by mouth once daily.      diclofenac sodium 1 % Gel Apply 2 g topically once daily.      dorzolamide (TRUSOPT) 2 % ophthalmic solution       dorzolamide-timolol 2-0.5% (COSOPT) 2-0.5 % ophthalmic solution Place 1 drop into both eyes every 12 (twelve) hours. 1 Bottle 6    ergocalciferol, vitamin D2, 1,000 unit Cap Take 1,000 Units by mouth.      gabapentin (NEURONTIN) 100 MG capsule Take 1 capsule (100 mg total) by mouth 3 (three) times daily. 90 capsule 0    levothyroxine (SYNTHROID) 88 MCG tablet Take 1 tablet (88 mcg total) by mouth once daily. 90 tablet 3    metoprolol succinate (TOPROL-XL) 25 MG 24 hr tablet Take 0.5-1 tablets (12.5-25 mg total) by mouth once daily. 1/2 to 1 tab daily 90 tablet 3    pantoprazole (PROTONIX) 40 MG tablet Take 1 tablet (40 mg total) by mouth once daily. 90 tablet 3    pravastatin (PRAVACHOL) 40 MG tablet Take 1 tablet (40 mg total) by mouth once daily. 90 tablet 3    triamterene-hydrochlorothiazide 50-25 mg (DYAZIDE) 50-25 mg Cap Take 1 capsule by mouth once daily. 90 capsule 3    aspirin (ECOTRIN) 81 MG EC tablet Take 81 mg by mouth once daily.      latanoprost 0.005 % ophthalmic solution Place 1 drop into both eyes every evening. 1 Bottle 6    LORATADINE (CLARITIN ORAL) Take by mouth as needed.      nitroGLYCERIN (NITROSTAT) 0.4 MG SL tablet Place 0.4 mg under the tongue every 5 (five) minutes as needed.      sodium,potassium,&mag sulfates (SUPREP BOWEL PREP KIT) 17.5-3.13-1.6 gram SolR Take 1 kit by mouth as directed. 1 Bottle 0    triamcinolone acetonide 0.1% (KENALOG) 0.1 % cream Apply topically 4  (four) times daily. Apply to skin 80 g 1       Physical Exam:  Vital Signs:   Vitals:    02/10/17 0833   BP: 136/83   Pulse: 82   Resp: 18   Temp: 97.9 °F (36.6 °C)     General Appearance: Well appearing in no acute distress  ENT: OP clear  Chest: CTA B  CV: RRR, no m/r/g  Abd: s/nt/nd/nabs  Ext: no edema    Labs:  Lab Results   Component Value Date    WBC 7.88 01/18/2017    HGB 14.8 01/18/2017    HCT 45.0 01/18/2017    MCV 94 01/18/2017     01/18/2017     No results found for: INR, PROTIME  No results found for: IRON, TIBC, FERRITIN, SATURATEDIRO      IMPRESSION:  Patient Active Problem List   Diagnosis    Hypothyroidism    Heart disease, hypertensive, benign    Dyslipidemia    Vitamin D deficiency    Osteoporosis    Hiatal hernia    Primary open-angle glaucoma(365.11) - Both Eyes    Sprain of wrist, right    Moderate stage glaucoma    Screen for colon cancer       Colon cancer screening    Plan:  I have explained the risks and benefits of colonoscopy to the patient including but not limited to bleeding, perforation, infection, and death. The patient wishes to proceed with colonoscopy.

## 2017-02-10 NOTE — TRANSFER OF CARE
"Anesthesia Transfer of Care Note    Patient: Iliana Mariano    Procedure(s) Performed: Procedure(s) (LRB):  COLONOSCOPY (N/A)    Patient location: PACU    Anesthesia Type: MAC    Transport from OR: Transported from OR on room air with adequate spontaneous ventilation    Post pain: adequate analgesia    Post assessment: no apparent anesthetic complications    Post vital signs: stable    Level of consciousness: sedated    Nausea/Vomiting: no nausea/vomiting    Complications: none          Last vitals:   Visit Vitals    /82 (BP Location: Left arm, Patient Position: Lying, BP Method: Automatic)    Pulse 103    Temp 36.6 °C (97.9 °F) (Oral)    Resp 16    Ht 5' 5.5" (1.664 m)    Wt 63 kg (139 lb)    SpO2 95%    Breastfeeding No    BMI 22.78 kg/m2     "

## 2017-02-10 NOTE — DISCHARGE INSTRUCTIONS
Colonoscopy     A camera attached to a flexible tube with a viewing lens is used to take video pictures.     Colonoscopy is a test to view the inside of your lower digestive tract (colon and rectum). Sometimes it can show the last part of the small intestine (ileum). During the test, small pieces of tissue may be removed for testing. This is called a biopsy. Small growths, such as polyps, may also be removed.   Why is colonoscopy done?  The test is done to help look for colon cancer. And it can help find the source of abdominal pain, bleeding, and changes in bowel habits. It may be needed once a year, depending on factors such as your:  · Age  · Health history  · Family health history  · Symptoms  · Results from any prior colonoscopy  Risks and possible complications  These include:  · Bleeding               · A puncture or tear in the colon   · Risks of anesthesia  · A cancer lesion not being seen  Getting ready   To prepare for the test:  · Talk with your healthcare provider about the risks of the test (see below). Also ask your healthcare provider about alternatives to the test.  · Tell your healthcare provider about any medicines you take. Also tell him or her about any health conditions you may have.  · Make sure your rectum and colon are empty for the test. Follow the diet and bowel prep instructions exactly. If you dont, the test may need to be rescheduled.  · Plan for a friend or family member to drive you home after the test.     Colonoscopy provides an inside view of the entire colon.     You may discuss the results with your doctor right away or at a future visit.  During the test   The test is usually done in the hospital on an outpatient basis. This means you go home the same day. The procedure takes about 30 minutes. During that time:  · You are given relaxing (sedating) medicine through an IV line. You may be drowsy, or fully asleep.  · The healthcare provider will first give you a physical exam to  check for anal and rectal problems.  · Then the anus is lubricated and the scope inserted.  · If you are awake, you may have a feeling similar to needing to have a bowel movement. You may also feel pressure as air is pumped into the colon. Its OK to pass gas during the procedure.  · Biopsy, polyp removal, or other treatments may be done during the test.  After the test   You may have gas right after the test. It can help to try to pass it to help prevent later bloating. Your healthcare provider may discuss the results with you right away. Or you may need to schedule a follow-up visit to talk about the results. After the test, you can go back to your normal eating and other activities. You may be tired from the sedation and need to rest for a few hours.  Date Last Reviewed: 11/1/2016 © 2000-2016 [employer]. [address] [place] [address]. All rights reserved. This information is not intended as a substitute for professional medical care. Always follow your healthcare professional's instructions.        Hemorrhoids    Hemorrhoids are swollen and inflamed veins inside the rectum and near the anus. The rectum is the last several inches of the colon. The anus is the passage between the rectum and the outside of the body.  Causes  The veins can become swollen due to increased pressure in them. This is most often caused by:  · Chronic constipation or diarrhea  · Straining when having a bowel movement  · Sitting too long on the toilet  · A low-fiber diet  · Pregnancy  Symptoms  · Bleeding from the rectum (this may be noticeable after bowel movements)  · Lump near the anus  · Itching around the anus  · Pain around the anus  There are different types of hemorrhoids. Depending on the type you have and the severity, you may be able to treat yourself at home. In some cases, a procedure may be the best treatment option. Your healthcare provider can tell you more about this, if needed.  Home  care  General care  · To get relief from pain or itching, try:  ¨ Topical products. Your healthcare provider may prescribe or recommend creams, ointments, or pads that can be applied to the hemorrhoid. Use these exactly as directed.  ¨ Medicines. Your healthcare provider may recommend stool softeners, suppositories, or laxatives to help manage constipation. Use these exactly as directed.  ¨ Sitz baths. A sitz bath involves sitting in a few inches of warm bath water. Be careful not to make the water so hot that you burn yourself--test it before sitting in it. Soak for about 10 to 15 minutes a few times a day. This may help relieve pain.  Tips to help prevent hemorrhoids  · Eat more fiber. Fiber adds bulk to stool and absorbs water as it moves through your colon. This makes stool softer and easier to pass.  ¨ Increase the fiber in your diet with more fiber-rich foods. These include fresh fruit, vegetables, and whole grains.  ¨ Take a fiber supplement or bulking agent, if advised to by your provider. These include products such as psyllium or methylcellulose.  · Drink plenty of water, if directed to by your provider. This can help keep stool soft.  · Be more active. Frequent exercise aids digestion and helps prevent constipation. It may also help make bowel movements more regular.  · Dont strain during bowel movements. This can make hemorrhoids more likely. Also, dont sit on the toilet for long periods of time.  Follow-up care  Follow up with your healthcare provider, or as advised. If a culture or imaging tests were done, you will be notified of the results when they are ready. This may take a few days or longer.  When to seek medical advice  Call your healthcare provider right away if any of these occur:  · Increased bleeding from the rectum  · Increased pain around the rectum or anus  · Weakness or dizziness  Call 911  Call 911 or return to the emergency department right away if any of these occur:  · Trouble  breathing or swallowing  · Fainting or loss of consciousness  · Unusually fast heart rate  · Vomiting blood  · Large amounts of blood in stool  Date Last Reviewed: 6/22/2015 © 2000-2016 [employer]. [address] [place] [address], [place] [address]. All rights reserved. This information is not intended as a substitute for professional medical care. Always follow your healthcare professional's instructions.

## 2017-06-07 ENCOUNTER — TELEPHONE (OUTPATIENT)
Dept: INTERNAL MEDICINE | Facility: CLINIC | Age: 72
End: 2017-06-07

## 2017-06-07 DIAGNOSIS — I10 ESSENTIAL HYPERTENSION: Primary | ICD-10-CM

## 2017-06-07 RX ORDER — TRIAMTERENE/HYDROCHLOROTHIAZID 37.5-25 MG
1 TABLET ORAL DAILY
Qty: 90 TABLET | Refills: 3 | Status: SHIPPED | OUTPATIENT
Start: 2017-06-07 | End: 2017-07-18 | Stop reason: SDUPTHER

## 2017-06-07 NOTE — TELEPHONE ENCOUNTER
----- Message from Mary Jane Boyd sent at 6/7/2017  2:13 PM CDT -----  Patient is returning a missed call in reference to a medication.     Please call the patient back on her cell phone, that is her only contact this afternoon: [phone].     Thank you.

## 2017-06-07 NOTE — TELEPHONE ENCOUNTER
Pt called stating that her med Triamterene - HCTZ 50 -25 , pharmacy is stating that they can't get it. She called around and no one in [place] seems to be able to get it again. She was told that they are no longer making it.

## 2017-06-07 NOTE — TELEPHONE ENCOUNTER
----- Message from Tawana Acosta sent at 6/7/2017  1:11 PM CDT -----  Contact: pt  Pt request call concerning a prescription refill, pt can be reached at [phone]///thxMW

## 2017-06-07 NOTE — TELEPHONE ENCOUNTER
So I spoke with [facility]. They don't make that particular strength of the medication anymore. Let's change it to triamterene 37.5/hctz 25. She may have to eat an extra banana or citrus fruit daily to make up for the difference in the triamterene to hold on to the potassium but it should still work as good for her blood pressure control. Please let me know if you have further questions.  Breonna Borja MD    Please d/c the referral to pharmacy

## 2017-06-07 NOTE — TELEPHONE ENCOUNTER
i put in referral to pharmacy at [facility] to get their input on it's availability. We can use just plan HCTZ and potassium supplements instead of what she has to be the equivalent if it isn't available anymore. But let's see what [facility] has to say about it.

## 2017-07-05 ENCOUNTER — PATIENT OUTREACH (OUTPATIENT)
Dept: ADMINISTRATIVE | Facility: HOSPITAL | Age: 72
End: 2017-07-05

## 2017-07-05 DIAGNOSIS — Z12.31 ENCOUNTER FOR SCREENING MAMMOGRAM FOR MALIGNANT NEOPLASM OF BREAST: ICD-10-CM

## 2017-07-05 DIAGNOSIS — Z12.39 ENCOUNTER FOR SPECIAL SCREENING EXAMINATION FOR NEOPLASM OF BREAST: Primary | ICD-10-CM

## 2017-07-18 ENCOUNTER — OFFICE VISIT (OUTPATIENT)
Dept: INTERNAL MEDICINE | Facility: CLINIC | Age: 72
End: 2017-07-18
Payer: MEDICARE

## 2017-07-18 ENCOUNTER — LAB VISIT (OUTPATIENT)
Dept: LAB | Facility: HOSPITAL | Age: 72
End: 2017-07-18
Attending: FAMILY MEDICINE
Payer: MEDICARE

## 2017-07-18 VITALS
WEIGHT: 145.75 LBS | DIASTOLIC BLOOD PRESSURE: 82 MMHG | HEART RATE: 64 BPM | SYSTOLIC BLOOD PRESSURE: 130 MMHG | BODY MASS INDEX: 24.88 KG/M2 | HEIGHT: 64 IN | TEMPERATURE: 98 F

## 2017-07-18 DIAGNOSIS — E55.9 VITAMIN D DEFICIENCY: ICD-10-CM

## 2017-07-18 DIAGNOSIS — M81.0 OSTEOPOROSIS, UNSPECIFIED OSTEOPOROSIS TYPE, UNSPECIFIED PATHOLOGICAL FRACTURE PRESENCE: ICD-10-CM

## 2017-07-18 DIAGNOSIS — I11.9 HEART DISEASE, HYPERTENSIVE, BENIGN, WITHOUT HEART FAILURE: ICD-10-CM

## 2017-07-18 DIAGNOSIS — K44.9 HIATAL HERNIA: ICD-10-CM

## 2017-07-18 DIAGNOSIS — E03.4 HYPOTHYROIDISM DUE TO ACQUIRED ATROPHY OF THYROID: ICD-10-CM

## 2017-07-18 DIAGNOSIS — E16.2 HYPOGLYCEMIA: ICD-10-CM

## 2017-07-18 DIAGNOSIS — Z12.31 ENCOUNTER FOR SCREENING MAMMOGRAM FOR BREAST CANCER: ICD-10-CM

## 2017-07-18 DIAGNOSIS — B02.9 HERPES ZOSTER WITHOUT COMPLICATION: ICD-10-CM

## 2017-07-18 DIAGNOSIS — E78.5 DYSLIPIDEMIA: ICD-10-CM

## 2017-07-18 DIAGNOSIS — I11.9 HEART DISEASE, HYPERTENSIVE, BENIGN, WITHOUT HEART FAILURE: Primary | ICD-10-CM

## 2017-07-18 LAB
25(OH)D3+25(OH)D2 SERPL-MCNC: 32 NG/ML
ALBUMIN SERPL BCP-MCNC: 3.7 G/DL
ALP SERPL-CCNC: 64 U/L
ALT SERPL W/O P-5'-P-CCNC: 16 U/L
ANION GAP SERPL CALC-SCNC: 11 MMOL/L
AST SERPL-CCNC: 24 U/L
BASOPHILS # BLD AUTO: 0.03 K/UL
BASOPHILS NFR BLD: 0.3 %
BILIRUB SERPL-MCNC: 0.3 MG/DL
BUN SERPL-MCNC: 12 MG/DL
CALCIUM SERPL-MCNC: 9.4 MG/DL
CHLORIDE SERPL-SCNC: 105 MMOL/L
CHOLEST/HDLC SERPL: 2.7 {RATIO}
CO2 SERPL-SCNC: 28 MMOL/L
CREAT SERPL-MCNC: 0.9 MG/DL
DIFFERENTIAL METHOD: NORMAL
EOSINOPHIL # BLD AUTO: 0.1 K/UL
EOSINOPHIL NFR BLD: 1.4 %
ERYTHROCYTE [DISTWIDTH] IN BLOOD BY AUTOMATED COUNT: 13 %
EST. GFR  (AFRICAN AMERICAN): >60 ML/MIN/1.73 M^2
EST. GFR  (NON AFRICAN AMERICAN): >60 ML/MIN/1.73 M^2
GLUCOSE SERPL-MCNC: 71 MG/DL
HCT VFR BLD AUTO: 41.1 %
HDL/CHOLESTEROL RATIO: 37.5 %
HDLC SERPL-MCNC: 168 MG/DL
HDLC SERPL-MCNC: 63 MG/DL
HGB BLD-MCNC: 13.3 G/DL
LDLC SERPL CALC-MCNC: 80.2 MG/DL
LYMPHOCYTES # BLD AUTO: 2.5 K/UL
LYMPHOCYTES NFR BLD: 24.8 %
MCH RBC QN AUTO: 30.5 PG
MCHC RBC AUTO-ENTMCNC: 32.4 %
MCV RBC AUTO: 94 FL
MONOCYTES # BLD AUTO: 0.9 K/UL
MONOCYTES NFR BLD: 8.5 %
NEUTROPHILS # BLD AUTO: 6.5 K/UL
NEUTROPHILS NFR BLD: 64.8 %
NONHDLC SERPL-MCNC: 105 MG/DL
PLATELET # BLD AUTO: 245 K/UL
PMV BLD AUTO: 10.7 FL
POTASSIUM SERPL-SCNC: 4.5 MMOL/L
PROT SERPL-MCNC: 6.7 G/DL
RBC # BLD AUTO: 4.36 M/UL
SODIUM SERPL-SCNC: 144 MMOL/L
T4 FREE SERPL-MCNC: 1.08 NG/DL
TRIGL SERPL-MCNC: 124 MG/DL
TSH SERPL DL<=0.005 MIU/L-ACNC: 2.95 UIU/ML
WBC # BLD AUTO: 10.03 K/UL

## 2017-07-18 PROCEDURE — 80061 LIPID PANEL: CPT

## 2017-07-18 PROCEDURE — 99214 OFFICE O/P EST MOD 30 MIN: CPT | Mod: S$PBB,,, | Performed by: FAMILY MEDICINE

## 2017-07-18 PROCEDURE — 1126F AMNT PAIN NOTED NONE PRSNT: CPT | Mod: ,,, | Performed by: FAMILY MEDICINE

## 2017-07-18 PROCEDURE — 99999 PR PBB SHADOW E&M-EST. PATIENT-LVL III: CPT | Mod: PBBFAC,,, | Performed by: FAMILY MEDICINE

## 2017-07-18 PROCEDURE — 36415 COLL VENOUS BLD VENIPUNCTURE: CPT | Mod: PO

## 2017-07-18 PROCEDURE — 83036 HEMOGLOBIN GLYCOSYLATED A1C: CPT

## 2017-07-18 PROCEDURE — 84439 ASSAY OF FREE THYROXINE: CPT

## 2017-07-18 PROCEDURE — 85025 COMPLETE CBC W/AUTO DIFF WBC: CPT

## 2017-07-18 PROCEDURE — 84443 ASSAY THYROID STIM HORMONE: CPT

## 2017-07-18 PROCEDURE — 82306 VITAMIN D 25 HYDROXY: CPT

## 2017-07-18 PROCEDURE — 1159F MED LIST DOCD IN RCRD: CPT | Mod: ,,, | Performed by: FAMILY MEDICINE

## 2017-07-18 PROCEDURE — 80053 COMPREHEN METABOLIC PANEL: CPT

## 2017-07-18 RX ORDER — ACYCLOVIR 400 MG/1
800 TABLET ORAL 3 TIMES DAILY
Qty: 42 TABLET | Refills: 0 | Status: SHIPPED | OUTPATIENT
Start: 2017-07-18 | End: 2018-01-31

## 2017-07-18 RX ORDER — TRIAMTERENE/HYDROCHLOROTHIAZID 37.5-25 MG
1.5 TABLET ORAL DAILY
Qty: 135 TABLET | Refills: 3 | Status: SHIPPED | OUTPATIENT
Start: 2017-07-18 | End: 2018-08-01 | Stop reason: SDUPTHER

## 2017-07-18 NOTE — PROGRESS NOTES
Subjective:      Patient ID: Iliana Mariano is a 71 y.o. female.    Chief Complaint: Follow-up (6 months )    Patient is here today for followup of multiple issues.    htn- recently had to change her medication because they stopped making the 50/37.5 dosing.  Since being on the 30s and 0.5/25 dosing she's noticed that she's been a little bit more dizzy and just feeling off.  She's interested in probably trying to do a 1-1/2 tablets per day.  Blood pressures controlled today.      She does have a history of shingles.  Lately her right arm started hurting her suddenly.  She can't lift it at times and it does radiate up into her trapezius muscle.  She's been using some Biofreeze which help but she's concerned that it might be a reoccurrence of the shingles.  Last time it occurred in her left cervical vertebrae that radiated down her arms.      Patient also has a history of hypothyroidism.  She is currently on 88 mcg.  Labs ordered    She has a history of vitamin D deficiency.  She's currently on over-the-counter supplementation.  Her insurance won't allow us to check it more than once per year.      Hyperlipidemia-currently controlled with statin.  Willing to do blood work today.    She reports that she's been a little bit more dizzy lately and feeling like she's been having some issues with some hypoglycemia as well.  She reports that she's been trying to eat better.                Lab Results   Component Value Date    WBC 7.88 01/18/2017    HGB 14.8 01/18/2017    HCT 45.0 01/18/2017     01/18/2017    CHOL 208 (H) 01/18/2017    TRIG 101 01/18/2017    HDL 76 (H) 01/18/2017    ALT 23 01/18/2017    AST 34 01/18/2017     01/18/2017    K 3.9 01/18/2017     01/18/2017    CREATININE 0.9 01/18/2017    BUN 12 01/18/2017    CO2 30 (H) 01/18/2017    TSH 2.500 01/18/2017       Review of Systems   Constitutional: Positive for activity change. Negative for chills, fatigue and fever.   HENT: Negative for ear  pain and trouble swallowing.    Eyes: Negative for pain and visual disturbance.   Respiratory: Negative for cough and shortness of breath.    Cardiovascular: Negative for chest pain and leg swelling.   Gastrointestinal: Negative for abdominal pain, blood in stool, nausea and vomiting.   Endocrine: Negative for cold intolerance and heat intolerance.   Genitourinary: Negative for dysuria and frequency.   Musculoskeletal: Positive for arthralgias. Negative for joint swelling, myalgias and neck pain.   Skin: Negative for color change and rash.   Neurological: Positive for dizziness. Negative for weakness and headaches.   Psychiatric/Behavioral: Negative for behavioral problems and sleep disturbance.     Objective:     Physical Exam   Constitutional: She appears well-developed and well-nourished.   HENT:   Head: Normocephalic and atraumatic.   Right Ear: Tympanic membrane normal.   Left Ear: Tympanic membrane normal.   Mouth/Throat: Oropharynx is clear and moist.   Eyes: Conjunctivae and EOM are normal.   Neck: Normal range of motion. Neck supple.   Cardiovascular: Normal rate and regular rhythm.    Pulmonary/Chest: Effort normal and breath sounds normal.   Musculoskeletal:        Right shoulder: She exhibits decreased range of motion, tenderness, bony tenderness and pain. She exhibits no spasm.        Cervical back: She exhibits tenderness, bony tenderness and pain. She exhibits normal range of motion and no spasm.   Skin: No rash noted. Rash is not vesicular.   Psychiatric: She has a normal mood and affect. Her speech is normal and behavior is normal.   Nursing note and vitals reviewed.    Assessment:     1. Heart disease, hypertensive, benign, without heart failure    2. Dyslipidemia    3. Encounter for screening mammogram for breast cancer    4. Vitamin D deficiency    5. Osteoporosis, unspecified osteoporosis type, unspecified pathological fracture presence    6. Hiatal hernia    7. Hypothyroidism due to acquired  atrophy of thyroid    8. Hypoglycemia    9. Herpes zoster without complication      Plan:   Iliana was seen today for follow-up.    Diagnoses and all orders for this visit:    Heart disease, hypertensive, benign, without heart failure- adjust meds.   -     triamterene-hydrochlorothiazide 37.5-25 mg (MAXZIDE-25) 37.5-25 mg per tablet; Take 1.5 tablets by mouth once daily.  -     TSH; Future  -     T4, free; Future  -     Lipid panel; Future  -     Comprehensive metabolic panel; Future  -     CBC auto differential; Future  -     Hemoglobin A1c; Future  -     Vitamin D; Future    Dyslipidemia - stable, labs ordered today.  Continue with current medications and interventions until labs are reviewed to make adjustments.     -     TSH; Future  -     T4, free; Future  -     Lipid panel; Future  -     Comprehensive metabolic panel; Future  -     CBC auto differential; Future  -     Hemoglobin A1c; Future  -     Vitamin D; Future    Encounter for screening mammogram for breast cancer- schedule  -     TSH; Future  -     T4, free; Future  -     Lipid panel; Future  -     Comprehensive metabolic panel; Future  -     CBC auto differential; Future  -     Hemoglobin A1c; Future  -     Vitamin D; Future    Vitamin D deficiency- - stable, labs ordered today.  Continue with current medications and interventions until labs are reviewed to make adjustments.   -     TSH; Future  -     T4, free; Future  -     Lipid panel; Future  -     Comprehensive metabolic panel; Future  -     CBC auto differential; Future  -     Hemoglobin A1c; Future  -     Vitamin D; Future    Osteoporosis, unspecified osteoporosis type, unspecified pathological fracture presence- stable, labs ordered today.  Continue with current medications and interventions until labs are reviewed to make adjustments.   -     TSH; Future  -     T4, free; Future  -     Lipid panel; Future  -     Comprehensive metabolic panel; Future  -     CBC auto differential; Future  -      Hemoglobin A1c; Future  -     Vitamin D; Future    Hiatal hernia- stable,  Continue with current medications and interventions until labs are reviewed to make adjustments.   -     TSH; Future  -     T4, free; Future  -     Lipid panel; Future  -     Comprehensive metabolic panel; Future  -     CBC auto differential; Future  -     Hemoglobin A1c; Future  -     Vitamin D; Future    Hypothyroidism due to acquired atrophy of thyroid- stable, labs ordered today.  Continue with current medications and interventions until labs are reviewed to make adjustments.   -     TSH; Future  -     T4, free; Future  -     Lipid panel; Future  -     Comprehensive metabolic panel; Future  -     CBC auto differential; Future  -     Hemoglobin A1c; Future  -     Vitamin D; Future    Hypoglycemia- stable, labs ordered today.  Continue with current medications and interventions until labs are reviewed to make adjustments.   -     Hemoglobin A1c; Future    Herpes zoster without complication- trial of acyclovir, however no rash present. Pt highly concerned that this could be shingles again. If no improvement then consider nsaids. Cont with biofreeze.     Other orders  -     acyclovir (ZOVIRAX) 400 MG tablet; Take 2 tablets (800 mg total) by mouth 3 (three) times daily.            Return in about 6 months (around 1/18/2018) for chronic issues.

## 2017-07-19 ENCOUNTER — PATIENT MESSAGE (OUTPATIENT)
Dept: INTERNAL MEDICINE | Facility: CLINIC | Age: 72
End: 2017-07-19

## 2017-07-19 DIAGNOSIS — E78.5 DYSLIPIDEMIA: ICD-10-CM

## 2017-07-19 DIAGNOSIS — E03.4 HYPOTHYROIDISM DUE TO ACQUIRED ATROPHY OF THYROID: ICD-10-CM

## 2017-07-19 LAB
ESTIMATED AVG GLUCOSE: 111 MG/DL
HBA1C MFR BLD HPLC: 5.5 %

## 2017-07-19 RX ORDER — PRAVASTATIN SODIUM 40 MG/1
40 TABLET ORAL DAILY
Qty: 90 TABLET | Refills: 3 | Status: SHIPPED | OUTPATIENT
Start: 2017-07-19 | End: 2018-08-01 | Stop reason: SDUPTHER

## 2017-07-19 RX ORDER — LEVOTHYROXINE SODIUM 88 UG/1
88 TABLET ORAL DAILY
Qty: 90 TABLET | Refills: 3 | Status: SHIPPED | OUTPATIENT
Start: 2017-07-19 | End: 2018-08-01 | Stop reason: SDUPTHER

## 2017-08-03 ENCOUNTER — HOSPITAL ENCOUNTER (OUTPATIENT)
Dept: RADIOLOGY | Facility: HOSPITAL | Age: 72
Discharge: HOME | End: 2017-08-03
Attending: FAMILY MEDICINE
Payer: MEDICARE

## 2017-08-03 VITALS — BODY MASS INDEX: 24.75 KG/M2 | HEIGHT: 64 IN | WEIGHT: 145 LBS

## 2017-08-03 DIAGNOSIS — Z12.39 ENCOUNTER FOR SPECIAL SCREENING EXAMINATION FOR NEOPLASM OF BREAST: ICD-10-CM

## 2017-08-03 DIAGNOSIS — Z12.31 ENCOUNTER FOR SCREENING MAMMOGRAM FOR MALIGNANT NEOPLASM OF BREAST: ICD-10-CM

## 2017-08-03 PROCEDURE — 77063 BREAST TOMOSYNTHESIS BI: CPT | Mod: 26,,, | Performed by: RADIOLOGY

## 2017-08-03 PROCEDURE — 77067 SCR MAMMO BI INCL CAD: CPT | Mod: TC

## 2017-08-03 PROCEDURE — 77067 SCR MAMMO BI INCL CAD: CPT | Mod: 26,,, | Performed by: RADIOLOGY

## 2017-08-21 ENCOUNTER — TELEPHONE (OUTPATIENT)
Dept: INTERNAL MEDICINE | Facility: CLINIC | Age: 72
End: 2017-08-21

## 2017-08-21 RX ORDER — PANTOPRAZOLE SODIUM 40 MG/1
40 TABLET, DELAYED RELEASE ORAL DAILY
Qty: 90 TABLET | Refills: 3 | Status: SHIPPED | OUTPATIENT
Start: 2017-08-21 | End: 2018-08-01 | Stop reason: SDUPTHER

## 2017-08-21 NOTE — TELEPHONE ENCOUNTER
----- Message from Lizy Storey LPN sent at 8/21/2017 11:23 AM CDT -----  Contact: pt      ----- Message -----  From: Tawana Acosta  Sent: 8/21/2017  10:06 AM  To: [facility] Staff    The pt request a call concerning a medication refill, pt can be reached at [phone]///thxMW

## 2018-01-31 ENCOUNTER — TELEPHONE (OUTPATIENT)
Dept: INTERNAL MEDICINE | Facility: CLINIC | Age: 73
End: 2018-01-31

## 2018-01-31 ENCOUNTER — OFFICE VISIT (OUTPATIENT)
Dept: INTERNAL MEDICINE | Facility: CLINIC | Age: 73
End: 2018-01-31
Payer: MEDICARE

## 2018-01-31 VITALS
WEIGHT: 145.06 LBS | BODY MASS INDEX: 24.17 KG/M2 | DIASTOLIC BLOOD PRESSURE: 82 MMHG | TEMPERATURE: 98 F | SYSTOLIC BLOOD PRESSURE: 136 MMHG | HEART RATE: 72 BPM | HEIGHT: 65 IN

## 2018-01-31 DIAGNOSIS — R29.898 LEFT HAND WEAKNESS: Primary | ICD-10-CM

## 2018-01-31 DIAGNOSIS — E55.9 VITAMIN D DEFICIENCY: ICD-10-CM

## 2018-01-31 DIAGNOSIS — I11.9 BENIGN HYPERTENSIVE HEART DISEASE WITHOUT CONGESTIVE HEART FAILURE: ICD-10-CM

## 2018-01-31 DIAGNOSIS — E03.4 HYPOTHYROIDISM DUE TO ACQUIRED ATROPHY OF THYROID: ICD-10-CM

## 2018-01-31 DIAGNOSIS — E78.5 DYSLIPIDEMIA: ICD-10-CM

## 2018-01-31 PROCEDURE — 99214 OFFICE O/P EST MOD 30 MIN: CPT | Mod: S$PBB,,, | Performed by: FAMILY MEDICINE

## 2018-01-31 PROCEDURE — 1159F MED LIST DOCD IN RCRD: CPT | Mod: ,,, | Performed by: FAMILY MEDICINE

## 2018-01-31 PROCEDURE — 90662 IIV NO PRSV INCREASED AG IM: CPT | Mod: PBBFAC,PO

## 2018-01-31 PROCEDURE — 1126F AMNT PAIN NOTED NONE PRSNT: CPT | Mod: ,,, | Performed by: FAMILY MEDICINE

## 2018-01-31 PROCEDURE — 99999 PR PBB SHADOW E&M-EST. PATIENT-LVL III: CPT | Mod: PBBFAC,,, | Performed by: FAMILY MEDICINE

## 2018-01-31 PROCEDURE — 99213 OFFICE O/P EST LOW 20 MIN: CPT | Mod: PBBFAC,PO,25 | Performed by: FAMILY MEDICINE

## 2018-01-31 NOTE — PROGRESS NOTES
Subjective:      Patient ID: Iliana Mariano is a 72 y.o. female.    Chief Complaint: Follow-up (Chronic issues, labs)    Disclaimer:  This note is prepared using voice recognition software and as such is likely to have errors and has not been proof read. Please contact me for questions.     Patient is here today for followup of multiple issues.    htn- controlled. Taking 1.5 tablets now. Not dizzy. Triamterene / hctz taking at the higher dose since the prior ones were not available.  No problems with medication..     Allergic rhinitis- taking claritin but havnig to skip every 3rd day because of dryness in the nose..     She does have a history of shingles.  Left arm is good, but occasinoally with the left hand with tingling. Still doing needle work. Loss of strength in it. May have carpal tunnel issues. Has nodule at base of left thumb.  Would be interested in proceeding forward.    Patient also has a history of hypothyroidism.  She is currently on 88 mcg.      She has a history of vitamin D deficiency.  She's currently on over-the-counter supplementation.  Her insurance won't allow us to check it more than once per year.  1000IU daily.     Hyperlipidemia-currently controlled with statin.  Pravastatin 40mg.                     Lab Results   Component Value Date    WBC 10.03 07/18/2017    HGB 13.3 07/18/2017    HCT 41.1 07/18/2017     07/18/2017    CHOL 168 07/18/2017    TRIG 124 07/18/2017    HDL 63 07/18/2017    ALT 16 07/18/2017    AST 24 07/18/2017     07/18/2017    K 4.5 07/18/2017     07/18/2017    CREATININE 0.9 07/18/2017    BUN 12 07/18/2017    CO2 28 07/18/2017    TSH 2.954 07/18/2017    HGBA1C 5.5 07/18/2017       Review of Systems   Constitutional: Negative for activity change, fatigue and unexpected weight change.   HENT: Negative for hearing loss, rhinorrhea and trouble swallowing.    Eyes: Negative for discharge and visual disturbance.   Respiratory: Negative for chest tightness  "and wheezing.    Cardiovascular: Negative for chest pain and palpitations.   Gastrointestinal: Negative for blood in stool, constipation, diarrhea and vomiting.   Endocrine: Negative for polydipsia and polyuria.   Genitourinary: Negative for difficulty urinating, dysuria, hematuria and menstrual problem.   Musculoskeletal: Negative for arthralgias, joint swelling and neck pain.   Neurological: Positive for weakness and numbness. Negative for headaches.        Left hand   Psychiatric/Behavioral: Negative for confusion and dysphoric mood.     Objective:     Vitals:    01/31/18 0836   BP: 136/82   Pulse: 72   Temp: 98.2 °F (36.8 °C)   TempSrc: Tympanic   Weight: 65.8 kg (145 lb 1 oz)   Height: 5' 5" (1.651 m)     Physical Exam   Constitutional: She appears well-developed and well-nourished.   HENT:   Head: Normocephalic and atraumatic.   Right Ear: Tympanic membrane normal.   Left Ear: Tympanic membrane normal.   Mouth/Throat: Oropharynx is clear and moist.   Eyes: Conjunctivae and EOM are normal.   Neck: Normal range of motion. Neck supple.   Cardiovascular: Normal rate and regular rhythm.    Pulmonary/Chest: Effort normal and breath sounds normal.   Abdominal: Soft. Bowel sounds are normal. She exhibits no distension. There is no tenderness. There is no guarding.   Musculoskeletal:        Left hand: She exhibits tenderness. She exhibits normal range of motion and no bony tenderness. Decreased sensation noted. Decreased sensation is present in the ulnar distribution and is present in the medial distribution. Decreased strength noted. She exhibits wrist extension trouble. She exhibits no finger abduction and no thumb/finger opposition.   Psychiatric: She has a normal mood and affect. Her speech is normal and behavior is normal.   Nursing note and vitals reviewed.    Assessment:     1. Left hand weakness    2. Hypothyroidism due to acquired atrophy of thyroid    3. Vitamin D deficiency    4. Dyslipidemia    5. Benign " hypertensive heart disease without congestive heart failure      Plan:   Iliana was seen today for follow-up.    Diagnoses and all orders for this visit:    Left hand weakness-new needing workup.  Previous to the patient thought it was more shingles however with the weakness like to proceed forward to rule out other additional etiology such as carpal tunnel.  Comments:  set up emg on left hand. hx of shingles on left cervical region.   Orders:  -     EMG W/ ULTRASOUND AND NERVE CONDUCTION TEST 1 Extremity; Future  -     TSH; Future  -     T4, free; Future  -     Vitamin D; Future  -     Comprehensive metabolic panel; Future  -     Lipid panel; Future  -     CBC auto differential; Future    Hypothyroidism due to acquired atrophy of thyroid  Comments:  stable with meds. labs prior to next visit.   Orders:  -     TSH; Future  -     T4, free; Future  -     Vitamin D; Future  -     Comprehensive metabolic panel; Future  -     Lipid panel; Future  -     CBC auto differential; Future    Vitamin D deficiency  Comments:  stable with meds. labs prior to next visit.   Orders:  -     TSH; Future  -     T4, free; Future  -     Vitamin D; Future  -     Comprehensive metabolic panel; Future  -     Lipid panel; Future  -     CBC auto differential; Future    Dyslipidemia  Comments:  stable with meds. labs prior to next visit.   Orders:  -     TSH; Future  -     T4, free; Future  -     Vitamin D; Future  -     Comprehensive metabolic panel; Future  -     Lipid panel; Future  -     CBC auto differential; Future    Benign hypertensive heart disease without congestive heart failure  Comments:  stable with meds. labs prior to next visit.   Orders:  -     TSH; Future  -     T4, free; Future  -     Vitamin D; Future  -     Comprehensive metabolic panel; Future  -     Lipid panel; Future  -     CBC auto differential; Future    Other orders  -     Influenza - High Dose (65+) (PF) (IM)            Follow-up in about 6 months (around 7/31/2018)  for physical with Dr DWYER.

## 2018-02-02 ENCOUNTER — OFFICE VISIT (OUTPATIENT)
Dept: PHYSICAL MEDICINE AND REHAB | Facility: CLINIC | Age: 73
End: 2018-02-02
Payer: MEDICARE

## 2018-02-02 VITALS
HEART RATE: 92 BPM | BODY MASS INDEX: 24.16 KG/M2 | HEIGHT: 65 IN | WEIGHT: 145 LBS | SYSTOLIC BLOOD PRESSURE: 153 MMHG | DIASTOLIC BLOOD PRESSURE: 93 MMHG | RESPIRATION RATE: 16 BRPM

## 2018-02-02 DIAGNOSIS — G56.03 BILATERAL CARPAL TUNNEL SYNDROME: ICD-10-CM

## 2018-02-02 PROCEDURE — 1159F MED LIST DOCD IN RCRD: CPT | Mod: ,,, | Performed by: PHYSICAL MEDICINE & REHABILITATION

## 2018-02-02 PROCEDURE — 99999 PR PBB SHADOW E&M-EST. PATIENT-LVL III: CPT | Mod: PBBFAC,,, | Performed by: PHYSICAL MEDICINE & REHABILITATION

## 2018-02-02 PROCEDURE — 1126F AMNT PAIN NOTED NONE PRSNT: CPT | Mod: ,,, | Performed by: PHYSICAL MEDICINE & REHABILITATION

## 2018-02-02 PROCEDURE — 95912 NRV CNDJ TEST 11-12 STUDIES: CPT | Mod: 26,S$PBB,, | Performed by: PHYSICAL MEDICINE & REHABILITATION

## 2018-02-02 PROCEDURE — 99213 OFFICE O/P EST LOW 20 MIN: CPT | Mod: PBBFAC,PO,25 | Performed by: PHYSICAL MEDICINE & REHABILITATION

## 2018-02-02 PROCEDURE — 99204 OFFICE O/P NEW MOD 45 MIN: CPT | Mod: 25,S$PBB,, | Performed by: PHYSICAL MEDICINE & REHABILITATION

## 2018-02-02 PROCEDURE — 95912 NRV CNDJ TEST 11-12 STUDIES: CPT | Mod: PBBFAC,PO | Performed by: PHYSICAL MEDICINE & REHABILITATION

## 2018-02-02 NOTE — PATIENT INSTRUCTIONS
Carpal Tunnel Syndrome Prevention Tips  Some repetitive hand activities put you at higher risk for carpal tunnel syndrome (CTS). But you can reduce your risk. Learn how to change the way you use your hands. Below are tips for at home and on the job. Be sure to also follow the hand and wrist safety policies at your workplace.      Keep your wrist in a neutral (straight) position when exercising.      Keep your wrist in neutral  Keep a neutral (straight) wrist position as often as you can. Dont use your wrist in a bent (flexed) position for long periods of time. This includes extended or twisted positions.  Watch your   Dont just use your thumb and index finger to grasp or lift. This can put stress on your wrist. When you can, use your whole hand and all its fingers to grasp an object.  Minimize repetition  Dont move your arms or hands or hold an object in the same way for long periods of time. Even simple, light tasks can cause injury this way. Instead, alternate tasks or switch hands.  Rest your hands  Give your hands a break from time to time with a rest. Even a few minutes once an hour can help.  Reduce speed and force  Slow down the speed in which you do a forceful, repetitive motion. This gives your wrist time to recover from the effort. Use power tools to help reduce the force.  Strengthen the muscles  Weak muscles may lead to a poor wrist or arm position. Exercises will make your hand and arm muscles stronger. This can help you keep a better position.  Date Last Reviewed: 9/11/2015 © 2000-2017 [employer]. [address] [place] [address], [place] [address]. All rights reserved. This information is not intended as a substitute for professional medical care. Always follow your healthcare professional's instructions.        Carpal Tunnel Syndrome    Carpal tunnel syndrome is a painful condition of the wrist and arm. It is caused by pressure on the median nerve.  The median nerve is one of the  nerves that give feeling and movement to the hand. It passes through a tunnel in the wrist called the carpal tunnel. This tunnel is made up of bones and ligaments. Narrowing of this tunnel or swelling of the tissues inside the tunnel puts pressure on the median nerve. This causes numbness, pins and needles, or electric shooting pains in your hand and forearm. Often the pain is worse at night and may wake you when you are asleep.  Carpal tunnel syndrome may occur during pregnancy and with use of birth control pills. It is more common in workers who must often bend their wrists. It is also common in people who work with power tools that cause strong vibrations.  Home care  · Rest the painful wrist. Avoid repeated bending of the wrist back and forth. This puts pressure on the median nerve. Avoid using power tools with strong vibrations.  · If you were given a splint, wear it at night while you sleep. You may also wear it during the day for comfort.  · Move your fingers and wrists often to avoid stiffness.  · Elevate your arms on pillows when you lie down.  · Try using the unaffected hand more.  · Try not to hold your wrists in a bent, downward position.  · Sometimes changes in the work place may ease symptoms. If you type most of the day, it may help to change the position of your keyboard or add a wrist support. Your wrist should be in a neutral position and not bent back when typing.  · You may use over-the-counter pain medicine to treat pain and inflammation, unless another medicine was prescribed. Anti-inflammatory pain medicines, such as ibuprofen or naproxen may be more effective than acetaminophen, which treats pain, but not inflammation. If you have chronic liver or kidney disease or ever had a stomach ulcer or GI bleeding, talk with your doctor before using these medicines.  · Opioid pain medicine will only give temporary relief and does not treat the problem. If pain continues, you may need a shot of a  steroid drug into your wrist.  · If the above methods fail, you may need surgery. This will open the carpal tunnel and release the pressure on the trapped nerve.  Follow-up care  Follow up with your healthcare provider, or as advised, if the pain doesnt begin to improve within the next week.  If X-rays were taken, you will be notified of any new findings that may affect your care.  When to seek medical advice  Call your healthcare provider right away if any of these occur:  · Pain not improving with the above treatment  · Fingers or hand become cold, blue, numb, or tingly  · Your whole arm becomes swollen or weak  Date Last Reviewed: 11/23/2015 © 2000-2017 [employer]. [address] [place] [address], [place] [address]. All rights reserved. This information is not intended as a substitute for professional medical care. Always follow your healthcare professional's instructions.        Understanding Carpal Tunnel Syndrome    The carpal tunnel is a narrow space inside the wrist. It is ringed by bone and a band of tough tissue called the transverse carpal ligament. A major nerve called the median nerve runs from the forearm into the hand through the carpal tunnel. Tendons also run through the carpal tunnel.  With carpal tunnel syndrome, the tendons or nearby tissues within the carpal tunnel may swell or thicken. Or the transverse carpal ligament may harden and shorten. This narrows the space in the carpal tunnel and puts pressure on the median nerve. This pressure leads to tingling and numbness of the hand and wrist. In time, the condition can make even simple tasks hard to do.  What causes carpal tunnel syndrome?  Doctors arent entirely clear why the condition occurs. Certain things may make a person more likely to have it. These include:  · Being female  · Being pregnant  · Being overweight  · Having diabetes or rheumatoid arthritis  Symptoms of carpal tunnel syndrome  Symptoms often come and go. At first,  symptoms may occur mainly at night. Later, they may be noticed during the day as well. They may get worse with activities such as driving, reading, typing, or holding a phone. Symptoms can include:  · Tingling and numbness in the hand or wrist  · Sharp pain that shoots up the arm or down to the fingers  · Hand stiffness or cramping, especially in the morning  · Trouble making a fist  · Hand weakness and clumsiness  Treatment for carpal tunnel syndrome  Certain treatments help reduce the pressure on the median nerve and relieve symptoms. Choices for treatment may include one or more of the following:  · Wrist splint. This involves wearing a special brace on the wrist and hand. The splint holds the wrist straight, in a neutral position. This helps keep the carpal tunnel as open as possible.  · Cortisone shots. Cortisone is a medicine that helps reduce swelling. It is injected directly into the wrist. It helps shrink tissues inside the carpal tunnel. This relieves symptoms for a time.  · Pain medicines. You may take over-the-counter or prescription medicines to help reduce swelling and relieve symptoms.  · Surgery. If the condition doesnt respond to other treatments and doesnt go away on its own, you may need surgery. During surgery, the surgeon cuts the transverse carpal ligament to relieve pressure on the median nerve.     When to call your healthcare provider  Call your healthcare provider right away if you have any of these:  · Fever of 100.4°F (38°C) or higher, or as directed  · Symptoms that dont get better, or get worse  · New symptoms   Date Last Reviewed: 3/10/2016  © 5468-5102 [employer]. [address] [place] [address], [place] [address]. All rights reserved. This information is not intended as a substitute for professional medical care. Always follow your healthcare professional's instructions.

## 2018-02-02 NOTE — LETTER
February 2, 2018      Breonna Borja MD  [address] [facility]  [address]  [place] [address]           [facility] - Physiatry  [address]  [place] [address]  Phone: [phone]  Fax: [phone]          Patient: Iliana Mariano   MR Number: [id number]   YOB: 1945   Date of Visit: 2/2/2018       Dear Dr. Breonna Borja:    Thank you for referring Iliana Mariano to me for evaluation. Attached you will find relevant portions of my assessment and plan of care.    If you have questions, please do not hesitate to call me. I look forward to following Iliana Mariano along with you.    Sincerely,    Gladys Louise MD    Enclosure  CC:  No Recipients    If you would like to receive this communication electronically, please contact [email or web address] or [phone] to request more information on [employer] access.    For providers and/or their staff who would like to refer a patient to [facility], please contact us through our one-stop-shop provider referral line, [facility], at [phone].    If you feel you have received this communication in error or would no longer like to receive these types of communications, please e-mail [email or web address]

## 2018-02-02 NOTE — PROGRESS NOTES
[facility] [address] [place] [address]  Phone: [phone]          Full Name: Iliana Mariano YOB: 1945  Patient ID: [id number]      Visit Date: 2/2/2018 10:39  Age: 72 Years 4 Months Old  Examining Physician: Gladys Louise M.D.  Referring Physician: Dr Borja  Reason for Referral: left hand weakness        Chief Complaint   Patient presents with    Hand Pain     left hand weakness     HPI: This is a 72 y.o.  female being seen in clinic today for evaluation of left hand weakness and tingling at times that began after shingles about 3-4 years ago. She has symptoms on random occasions, but has noticed more tingling with increased hand usage.  In the past she has wrist pain on the right-that was relieved with therapy and bracing.     History obtained from patient    Past family, medical, social, and surgical history reviewed in chart    Review of Systems:     General- denies lethargy, weight change, fever, chills  Head/neck- denies swallowing difficulties  ENT- denies hearing changes  Cardiovascular-denies chest pain  Pulmonary- denies shortness of breath  GI- denies constipation or bowel incontinence  - denies bladder incontinence  Skin- denies wounds or rashes  Musculoskeletal- denies weakness, +pain  Neurologic- +numbness and tingling  Psychiatric- denies depressive or psychotic features, denies anxiety  Lymphatic-denies swelling  Endocrine- denies hypoglycemic symptoms/DM history +thyroid disorder  All other pertinent systems negative     Physical Examination:  General: Well developed, well nourished female, NAD  HEENT:NCAT EOMI bilaterally   Pulmonary:Normal respirations    Spinal Examination: CERVICAL  Active ROM is within normal limits.  Inspection: No deformity of spinal alignment.    Spinal Examination: LUMBAR or THORACIC  Active ROM is within normal limits.  Inspection: No deformity of spinal alignment.      Musculoskeletal Tests:  Phalen:   Elbow compression  (ulnar): neg  Tinels at wrist: neg    Bilateral Upper and Lower Extremities:  Pulses are 2+ at radial bilaterally.  Shoulder/Elbow/Wrist/Hand ROM wnl  Hip/Knee/Ankle ROM   Bilateral Extremities show normal capillary refill.  No signs of cyanosis, rubor, edema, skin changes, or dysvascular changes of appendages.  Nails appear intact.    Neurological Exam:  Cranial Nerves:  II-XII grossly intact    Manual Muscle Testing: (Motor 5=normal)  5/5 strength bilateral upper extremities    No focal atrophy is noted of either upper extremity.    Bilateral Reflexes:hypo at bic tri br  's response is absent bilaterally.  No clonus at knee or ankle.    Sensation: tested to light touch  - intact in arms  Gait: Narrow base and good arm swing.      Entire procedure explained to patient prior to proceeding.  Verbal consent obtained        SNC      Nerve / Sites Rec. Site Onset Lat Peak Lat Amp Segments Distance Velocity     ms ms µV  mm m/s   L Median - Digit II (Antidromic)      Wrist Dig II 3.1 4.0 26.9 Wrist - Dig  46   R Median - Digit II (Antidromic)      Wrist Dig II 3.0 3.8 13.9 Wrist - Dig  47   L Ulnar - Digit V (Antidromic)      Wrist Dig V 2.7 3.4 26.7 Wrist - Dig V 140 52   R Ulnar - Digit V (Antidromic)      Wrist Dig V 2.7 3.5 19.1 Wrist - Dig V 140 53   L Radial - Anatomical snuff box (Forearm)      Forearm Wrist 1.5 2.2 9.6 Forearm - Wrist 100 66   R Radial - Anatomical snuff box (Forearm)      Forearm Wrist 2.2 2.7 9.2 Forearm - Wrist 100 45       CSI      Nerve / Sites Rec. Site Peak Lat NP Amp Segments Peak Diff     ms µV  ms   R Median - CSI      Median Thumb 3.1 8.0 Median - Radial 0.5      Radial Thumb 2.6 4.8 Median - Ulnar 0.6      Median Ring 3.8 14.6 Median palm - Ulnar palm       Ulnar Ring 3.2 17.3        CSI    CSI 1.0       MNC      Nerve / Sites Muscle Latency Amplitude Duration Rel Amp Segments Distance Lat Diff Velocity     ms mV ms %  mm ms m/s   L Median - APB      Wrist APB 3.5  9.0 6.2 100 Wrist - APB 80        Elbow APB 7.6 8.5 6.4 94.6 Elbow - Wrist 205 4.1 50   R Median - APB      Wrist APB 3.6 10.7 6.3 100 Wrist - APB 80        Elbow APB 7.2 10.5 6.6 97.3 Elbow - Wrist 190 3.6 52   L Ulnar - ADM      Wrist ADM 3.2 10.6 8.6 100 Wrist - ADM 80        B.Elbow ADM 6.7 10.5 8.7 99 B.Elbow - Wrist 210 3.5 60      A.Elbow ADM 8.9 10.0 8.6 95.5 A.Elbow - B.Elbow 120 2.2 54         A.Elbow - Wrist  5.7    R Ulnar - ADM      Wrist ADM 3.2 10.3 7.7 100 Wrist - ADM 80        B.Elbow ADM 7.3 10.0 7.6 96.6 B.Elbow - Wrist 210 4.1 51      A.Elbow ADM 9.6 9.2 7.7 92.3 A.Elbow - B.Elbow 120 2.3 52         A.Elbow - Wrist  6.4                                              INTERPRETATION  -Bilateral median motor nerve conduction study showed normal latency, amplitude, and conduction velocity  -Bilateral median sensory nerve conduction study showed prolonged peak latency on the right and normal amplitude  -Bilateral ulnar motor nerve conduction study showed normal latency, amplitude, and conduction velocity  -Bilateral ulnar sensory nerve conduction study showed normal peak latency and amplitude  -Bilateral radial sensory nerve conduction study showed normal peak latency and amplitude  -Right combined sensory index showed a significant latency difference of 1.0 msec    IMPRESSION  1. ABNORMAL study  2. There is electrodiagnostic evidence of a MILD demyelinating median neuropathy (Carpal tunnel syndrome) across BILATERAL wrists-slightly worse on the LEFT    PLAN  1. Follow up with referring provider: Dr. Breonna Borja  2. Handouts on CTS prevention provided. Continue neutral wrist braces  3. This study is good for one year. If symptoms worsen or do not improve, please re-consult.    Gladys Louise M.D.  Physical Medicine and Rehab

## 2018-02-26 ENCOUNTER — TELEPHONE (OUTPATIENT)
Dept: INTERNAL MEDICINE | Facility: CLINIC | Age: 73
End: 2018-02-26

## 2018-02-26 NOTE — TELEPHONE ENCOUNTER
Please find out if she wants to see ortho about doing carpal tunnel surgery or if things are going ok with braces, since EMG study. Thanks.

## 2018-02-26 NOTE — TELEPHONE ENCOUNTER
Called pt, she states that she is not interested at this time because she is not having pain. She states that she just feels as if she is losing strength in her hands. So at this time she does not want to see ortho or try and type of therapy for this. That if they become bothersome then she will get back with us.

## 2018-04-26 DIAGNOSIS — E78.5 DYSLIPIDEMIA: ICD-10-CM

## 2018-04-26 RX ORDER — PRAVASTATIN SODIUM 40 MG/1
TABLET ORAL
Qty: 90 TABLET | Refills: 3 | Status: SHIPPED | OUTPATIENT
Start: 2018-04-26 | End: 2018-08-01

## 2018-07-23 ENCOUNTER — PATIENT OUTREACH (OUTPATIENT)
Dept: ADMINISTRATIVE | Facility: HOSPITAL | Age: 73
End: 2018-07-23

## 2018-07-25 ENCOUNTER — LAB VISIT (OUTPATIENT)
Dept: LAB | Facility: HOSPITAL | Age: 73
End: 2018-07-25
Attending: FAMILY MEDICINE
Payer: MEDICARE

## 2018-07-25 DIAGNOSIS — E78.5 DYSLIPIDEMIA: ICD-10-CM

## 2018-07-25 DIAGNOSIS — E03.4 HYPOTHYROIDISM DUE TO ACQUIRED ATROPHY OF THYROID: ICD-10-CM

## 2018-07-25 DIAGNOSIS — E55.9 VITAMIN D DEFICIENCY: ICD-10-CM

## 2018-07-25 DIAGNOSIS — I11.9 BENIGN HYPERTENSIVE HEART DISEASE WITHOUT CONGESTIVE HEART FAILURE: ICD-10-CM

## 2018-07-25 DIAGNOSIS — R29.898 LEFT HAND WEAKNESS: ICD-10-CM

## 2018-07-25 LAB
25(OH)D3+25(OH)D2 SERPL-MCNC: 36 NG/ML
ALBUMIN SERPL BCP-MCNC: 3.7 G/DL
ALP SERPL-CCNC: 77 U/L
ALT SERPL W/O P-5'-P-CCNC: 17 U/L
ANION GAP SERPL CALC-SCNC: 13 MMOL/L
AST SERPL-CCNC: 20 U/L
BASOPHILS # BLD AUTO: 0.06 K/UL
BASOPHILS NFR BLD: 0.8 %
BILIRUB SERPL-MCNC: 0.4 MG/DL
BUN SERPL-MCNC: 13 MG/DL
CALCIUM SERPL-MCNC: 10.2 MG/DL
CHLORIDE SERPL-SCNC: 103 MMOL/L
CHOLEST SERPL-MCNC: 193 MG/DL
CHOLEST/HDLC SERPL: 2.8 {RATIO}
CO2 SERPL-SCNC: 25 MMOL/L
CREAT SERPL-MCNC: 1 MG/DL
DIFFERENTIAL METHOD: ABNORMAL
EOSINOPHIL # BLD AUTO: 0.2 K/UL
EOSINOPHIL NFR BLD: 2.6 %
ERYTHROCYTE [DISTWIDTH] IN BLOOD BY AUTOMATED COUNT: 12.7 %
EST. GFR  (AFRICAN AMERICAN): >60 ML/MIN/1.73 M^2
EST. GFR  (NON AFRICAN AMERICAN): 56.8 ML/MIN/1.73 M^2
GLUCOSE SERPL-MCNC: 70 MG/DL
HCT VFR BLD AUTO: 42 %
HDLC SERPL-MCNC: 68 MG/DL
HDLC SERPL: 35.2 %
HGB BLD-MCNC: 13.3 G/DL
IMM GRANULOCYTES # BLD AUTO: 0.01 K/UL
IMM GRANULOCYTES NFR BLD AUTO: 0.1 %
LDLC SERPL CALC-MCNC: 101.6 MG/DL
LYMPHOCYTES # BLD AUTO: 2 K/UL
LYMPHOCYTES NFR BLD: 27.1 %
MCH RBC QN AUTO: 30.8 PG
MCHC RBC AUTO-ENTMCNC: 31.7 G/DL
MCV RBC AUTO: 97 FL
MONOCYTES # BLD AUTO: 0.6 K/UL
MONOCYTES NFR BLD: 8.4 %
NEUTROPHILS # BLD AUTO: 4.5 K/UL
NEUTROPHILS NFR BLD: 61 %
NONHDLC SERPL-MCNC: 125 MG/DL
NRBC BLD-RTO: 0 /100 WBC
PLATELET # BLD AUTO: 251 K/UL
PMV BLD AUTO: 10.2 FL
POTASSIUM SERPL-SCNC: 3.9 MMOL/L
PROT SERPL-MCNC: 7.2 G/DL
RBC # BLD AUTO: 4.32 M/UL
SODIUM SERPL-SCNC: 141 MMOL/L
T4 FREE SERPL-MCNC: 1.08 NG/DL
TRIGL SERPL-MCNC: 117 MG/DL
TSH SERPL DL<=0.005 MIU/L-ACNC: 6.61 UIU/ML
WBC # BLD AUTO: 7.35 K/UL

## 2018-07-25 PROCEDURE — 36415 COLL VENOUS BLD VENIPUNCTURE: CPT | Mod: PO

## 2018-07-25 PROCEDURE — 82306 VITAMIN D 25 HYDROXY: CPT

## 2018-07-25 PROCEDURE — 84443 ASSAY THYROID STIM HORMONE: CPT

## 2018-07-25 PROCEDURE — 85025 COMPLETE CBC W/AUTO DIFF WBC: CPT

## 2018-07-25 PROCEDURE — 80053 COMPREHEN METABOLIC PANEL: CPT

## 2018-07-25 PROCEDURE — 84439 ASSAY OF FREE THYROXINE: CPT

## 2018-07-25 PROCEDURE — 80061 LIPID PANEL: CPT

## 2018-08-01 ENCOUNTER — OFFICE VISIT (OUTPATIENT)
Dept: INTERNAL MEDICINE | Facility: CLINIC | Age: 73
End: 2018-08-01
Payer: MEDICARE

## 2018-08-01 VITALS
SYSTOLIC BLOOD PRESSURE: 130 MMHG | WEIGHT: 143.75 LBS | TEMPERATURE: 98 F | BODY MASS INDEX: 23.95 KG/M2 | HEIGHT: 65 IN | HEART RATE: 84 BPM | DIASTOLIC BLOOD PRESSURE: 70 MMHG

## 2018-08-01 DIAGNOSIS — I11.9 HEART DISEASE, HYPERTENSIVE, BENIGN, WITHOUT HEART FAILURE: ICD-10-CM

## 2018-08-01 DIAGNOSIS — E78.5 DYSLIPIDEMIA: ICD-10-CM

## 2018-08-01 DIAGNOSIS — E03.4 HYPOTHYROIDISM DUE TO ACQUIRED ATROPHY OF THYROID: Primary | ICD-10-CM

## 2018-08-01 DIAGNOSIS — E55.9 VITAMIN D DEFICIENCY: ICD-10-CM

## 2018-08-01 DIAGNOSIS — K44.9 HIATAL HERNIA: ICD-10-CM

## 2018-08-01 PROCEDURE — 99213 OFFICE O/P EST LOW 20 MIN: CPT | Mod: PBBFAC,PO | Performed by: FAMILY MEDICINE

## 2018-08-01 PROCEDURE — 99214 OFFICE O/P EST MOD 30 MIN: CPT | Mod: S$PBB,,, | Performed by: FAMILY MEDICINE

## 2018-08-01 PROCEDURE — 99999 PR PBB SHADOW E&M-EST. PATIENT-LVL III: CPT | Mod: PBBFAC,,, | Performed by: FAMILY MEDICINE

## 2018-08-01 RX ORDER — PANTOPRAZOLE SODIUM 40 MG/1
40 TABLET, DELAYED RELEASE ORAL DAILY
Qty: 90 TABLET | Refills: 3 | Status: SHIPPED | OUTPATIENT
Start: 2018-08-01 | End: 2019-12-11 | Stop reason: SDUPTHER

## 2018-08-01 RX ORDER — LEVOTHYROXINE SODIUM 100 UG/1
100 TABLET ORAL DAILY
Qty: 90 TABLET | Refills: 3 | Status: SHIPPED | OUTPATIENT
Start: 2018-08-01 | End: 2019-10-25 | Stop reason: SDUPTHER

## 2018-08-01 RX ORDER — METOPROLOL SUCCINATE 25 MG/1
12.5-25 TABLET, EXTENDED RELEASE ORAL DAILY
Qty: 90 TABLET | Refills: 3 | Status: SHIPPED | OUTPATIENT
Start: 2018-08-01 | End: 2019-10-25 | Stop reason: SDUPTHER

## 2018-08-01 RX ORDER — PRAVASTATIN SODIUM 40 MG/1
40 TABLET ORAL DAILY
Qty: 90 TABLET | Refills: 3 | Status: SHIPPED | OUTPATIENT
Start: 2018-08-01 | End: 2019-07-01

## 2018-08-01 RX ORDER — TRIAMTERENE/HYDROCHLOROTHIAZID 37.5-25 MG
1.5 TABLET ORAL DAILY
Qty: 135 TABLET | Refills: 3 | Status: SHIPPED | OUTPATIENT
Start: 2018-08-01 | End: 2019-08-12 | Stop reason: SDUPTHER

## 2018-08-01 NOTE — PATIENT INSTRUCTIONS
shingrix vaccine new, ask at pharmacy 2 shots, not live, usually around $50 per shot or less covered under Medicare Part D ( drug ) portion.

## 2018-08-01 NOTE — PROGRESS NOTES
Subjective:      Patient ID: Iliana Mariano is a 72 y.o. female.    Chief Complaint: Annual Exam    Disclaimer:  This note is prepared using voice recognition software and as such is likely to have errors and has not been proof read. Please contact me for questions.     Patient is here today for followup of multiple issues and annual exam.    htn- controlled. Taking 1.5 tablets now. Not dizzy. Triamterene / hctz taking at the higher dose since the prior ones were not available.  No problems with medication..     She does have a history of shingles.  Left arm is good, but occasinoally with the left hand with tingling. Still doing needle work. Is good candidate for the new shingles vaccine.     Patient also has a history of hypothyroidism.  She is currently on 88 mcg.  Levels of TSH > 6. Needing adjustment.     She has a history of vitamin D deficiency.  She's currently on over-the-counter supplementation.  Her insurance won't allow us to check it more than once per year.  1000IU daily.     Hyperlipidemia-currently controlled with statin.  Pravastatin 40mg.     Had mild back pain and feeling of UTI and so increased cranberry juice and improved. Family hx of sisters with significant kidney stones.                     Lab Results   Component Value Date    WBC 7.35 07/25/2018    HGB 13.3 07/25/2018    HCT 42.0 07/25/2018     07/25/2018    CHOL 193 07/25/2018    TRIG 117 07/25/2018    HDL 68 07/25/2018    ALT 17 07/25/2018    AST 20 07/25/2018     07/25/2018    K 3.9 07/25/2018     07/25/2018    CREATININE 1.0 07/25/2018    BUN 13 07/25/2018    CO2 25 07/25/2018    TSH 6.609 (H) 07/25/2018    HGBA1C 5.5 07/18/2017       Review of Systems   Constitutional: Negative for chills, fatigue and fever.   HENT: Negative for ear pain and trouble swallowing.    Eyes: Negative for pain and visual disturbance.   Respiratory: Negative for cough and shortness of breath.    Cardiovascular: Negative for chest pain and  "leg swelling.   Gastrointestinal: Negative for abdominal pain, blood in stool, nausea and vomiting.   Endocrine: Negative for cold intolerance and heat intolerance.   Genitourinary: Negative for dysuria and frequency.   Musculoskeletal: Negative for joint swelling, myalgias and neck pain.   Skin: Negative for color change and rash.   Neurological: Negative for dizziness and headaches.   Psychiatric/Behavioral: Negative for behavioral problems and sleep disturbance.     Objective:     Vitals:    08/01/18 0838   BP: 130/70   Pulse: 84   Temp: 98.2 °F (36.8 °C)   Weight: 65.2 kg (143 lb 11.8 oz)   Height: 5' 5" (1.651 m)     Physical Exam   Constitutional: She is oriented to person, place, and time. She appears well-developed and well-nourished.   HENT:   Head: Normocephalic and atraumatic.   Right Ear: External ear normal.   Left Ear: External ear normal.   Mouth/Throat: Oropharynx is clear and moist.   Eyes: EOM are normal.   Neck: Normal range of motion. Neck supple. No thyromegaly present.   Cardiovascular: Normal rate and regular rhythm.  Exam reveals no gallop and no friction rub.    No murmur heard.  Pulmonary/Chest: Effort normal. No respiratory distress. She has no wheezes. She has no rales.   Abdominal: Soft. Bowel sounds are normal. She exhibits no distension. There is no tenderness. There is no rebound.   Musculoskeletal: Normal range of motion. She exhibits no edema.   Lymphadenopathy:     She has no cervical adenopathy.   Neurological: She is alert and oriented to person, place, and time.   Skin: Skin is warm and dry. No rash noted.   Psychiatric: She has a normal mood and affect. Her behavior is normal. Judgment and thought content normal.   Vitals reviewed.    Assessment:     1. Hypothyroidism due to acquired atrophy of thyroid    2. Dyslipidemia    3. Heart disease, hypertensive, benign, without heart failure    4. Vitamin D deficiency    5. Hiatal hernia      Plan:   Iliana was seen today for annual " exam.    Diagnoses and all orders for this visit:    Hypothyroidism due to acquired atrophy of thyroid- increase from 88 to 100mcg due to labs.   Comments:   refilled meds.  Orders:  -     levothyroxine (SYNTHROID) 100 MCG tablet; Take 1 tablet (100 mcg total) by mouth once daily.  -     T4, free; Future  -     TSH; Future  -     Comprehensive metabolic panel; Future  -     CBC auto differential; Future  -     Lipid panel; Future  -     Vitamin D; Future    Dyslipidemia -- stable, Continue with current medications and interventions. Labs reviewed.       -     pravastatin (PRAVACHOL) 40 MG tablet; Take 1 tablet (40 mg total) by mouth once daily.  -     T4, free; Future  -     TSH; Future  -     Comprehensive metabolic panel; Future  -     CBC auto differential; Future  -     Lipid panel; Future  -     Vitamin D; Future    Heart disease, hypertensive, benign, without heart failure- stable, Continue with current medications and interventions. Labs reviewed.   -     triamterene-hydrochlorothiazide 37.5-25 mg (MAXZIDE-25) 37.5-25 mg per tablet; Take 1.5 tablets by mouth once daily.  -     T4, free; Future  -     TSH; Future  -     Comprehensive metabolic panel; Future  -     CBC auto differential; Future  -     Lipid panel; Future  -     Vitamin D; Future    Vitamin D deficiency- stable, Continue with current medications and interventions. Labs reviewed.   -     T4, free; Future  -     TSH; Future  -     Comprehensive metabolic panel; Future  -     CBC auto differential; Future  -     Lipid panel; Future  -     Vitamin D; Future    Hiatal hernia- - stable, Continue with current medications and interventions. Labs reviewed.     Other orders  -     metoprolol succinate (TOPROL-XL) 25 MG 24 hr tablet; Take 0.5-1 tablets (12.5-25 mg total) by mouth once daily. 1/2 to 1 tab daily  -     pantoprazole (PROTONIX) 40 MG tablet; Take 1 tablet (40 mg total) by mouth once daily.            Follow-up in about 1 year (around 8/1/2019)  for physical with Dr DWYER.

## 2018-10-30 ENCOUNTER — TELEPHONE (OUTPATIENT)
Dept: INTERNAL MEDICINE | Facility: CLINIC | Age: 73
End: 2018-10-30

## 2018-10-30 ENCOUNTER — OFFICE VISIT (OUTPATIENT)
Dept: INTERNAL MEDICINE | Facility: CLINIC | Age: 73
End: 2018-10-30
Payer: MEDICARE

## 2018-10-30 VITALS
RESPIRATION RATE: 16 BRPM | SYSTOLIC BLOOD PRESSURE: 134 MMHG | TEMPERATURE: 99 F | BODY MASS INDEX: 24.1 KG/M2 | HEIGHT: 65 IN | WEIGHT: 144.63 LBS | HEART RATE: 80 BPM | DIASTOLIC BLOOD PRESSURE: 76 MMHG

## 2018-10-30 DIAGNOSIS — L02.92 BOIL: Primary | ICD-10-CM

## 2018-10-30 PROCEDURE — 99214 OFFICE O/P EST MOD 30 MIN: CPT | Mod: PBBFAC,PO | Performed by: NURSE PRACTITIONER

## 2018-10-30 PROCEDURE — 99999 PR PBB SHADOW E&M-EST. PATIENT-LVL IV: CPT | Mod: PBBFAC,,, | Performed by: NURSE PRACTITIONER

## 2018-10-30 PROCEDURE — 99214 OFFICE O/P EST MOD 30 MIN: CPT | Mod: S$PBB,,, | Performed by: NURSE PRACTITIONER

## 2018-10-30 RX ORDER — DOXYCYCLINE 100 MG/1
100 CAPSULE ORAL EVERY 12 HOURS
Qty: 20 CAPSULE | Refills: 0 | Status: SHIPPED | OUTPATIENT
Start: 2018-10-30 | End: 2018-11-09

## 2018-10-30 RX ORDER — MUPIROCIN 20 MG/G
OINTMENT TOPICAL 3 TIMES DAILY
Qty: 30 G | Refills: 0 | Status: SHIPPED | OUTPATIENT
Start: 2018-10-30 | End: 2019-07-01

## 2018-10-30 NOTE — PROGRESS NOTES
"Subjective:       Patient ID: Iliana Mariano is a 73 y.o. female.    Chief Complaint: Abscess    Abscess   Chronicity:  NewProgression Since Onset: worsened as the day progressed  Abscess location: between breasts, on left inner breast.  Associated Symptoms: no fever, no chills  Characteristics: draining, painful and redness    Characteristics: no itching, no dryness, no scaling, no peeling, no swelling, no bruising and no blistering    Treatments Tried:  Nothing  Relieved by:  Nothing  Worsened by:  Nothing      /76 (BP Location: Left arm, Patient Position: Sitting, BP Method: Medium (Automatic))   Pulse 80   Temp 98.6 °F (37 °C) (Tympanic)   Resp 16   Ht 5' 5" (1.651 m)   Wt 65.6 kg (144 lb 10 oz)   BMI 24.07 kg/m²     Review of Systems   Constitutional: Negative for activity change, appetite change, chills, diaphoresis, fatigue, fever and unexpected weight change.   HENT: Negative.    Eyes: Negative for visual disturbance.   Respiratory: Negative for cough, shortness of breath and wheezing.    Cardiovascular: Negative for chest pain, palpitations and leg swelling.   Gastrointestinal: Negative for abdominal distention, abdominal pain, blood in stool, constipation, diarrhea, nausea and vomiting.   Genitourinary: Negative for decreased urine volume, difficulty urinating, dysuria, frequency, hematuria and urgency.   Skin: Positive for color change and wound.   Neurological: Negative.  Negative for dizziness, syncope, speech difficulty, light-headedness and headaches.   Psychiatric/Behavioral: Negative for agitation, confusion and hallucinations. The patient is not nervous/anxious.        Objective:      Physical Exam   Constitutional: She is oriented to person, place, and time. She appears well-developed and well-nourished. She is cooperative. No distress.   HENT:   Head: Normocephalic and atraumatic.   Eyes: Conjunctivae are normal. Right eye exhibits no discharge. Left eye exhibits no discharge. "   Cardiovascular: Normal rate, regular rhythm and normal heart sounds.   No murmur heard.  Pulmonary/Chest: Effort normal and breath sounds normal. No respiratory distress. She has no wheezes. She has no rales. She exhibits no tenderness.       Abdominal: Soft. She exhibits no distension.   Musculoskeletal: Normal range of motion.   Neurological: She is alert and oriented to person, place, and time.   Skin: Skin is warm and dry. No rash noted. She is not diaphoretic.   Psychiatric: She has a normal mood and affect. Her behavior is normal. Judgment and thought content normal.   Nursing note and vitals reviewed.      Assessment:       1. Boil        Plan:       Iliana was seen today for abscess.    Diagnoses and all orders for this visit:    Boil  -     doxycycline (VIBRAMYCIN) 100 MG Cap; Take 1 capsule (100 mg total) by mouth every 12 (twelve) hours. for 10 days  -     mupirocin (BACTROBAN) 2 % ointment; Apply topically 3 (three) times daily.    Keep wound clean and dry.  Apply warm compresses four times daily to the wound to help draw out infection.  Bactroban ointment three times day.  Keep wound covered when going outside or working.  If symptoms worsen or fail to improve with treatment, fever occurs, or if wound increases in size or fails to respond to antibiotic, see your Primary Care Provider or go to the nearest Emergency Room.

## 2018-10-30 NOTE — TELEPHONE ENCOUNTER
----- Message from Quyen Vu sent at 10/30/2018  4:56 PM CDT -----  Contact: pt  Calling in regards to Rx medication and please advise [phone]

## 2018-10-30 NOTE — TELEPHONE ENCOUNTER
I returned call to patient and she was calling to ask  About shingle injection she will check with the pharmacy about rx and give us a call back.as

## 2018-12-07 ENCOUNTER — OFFICE VISIT (OUTPATIENT)
Dept: INTERNAL MEDICINE | Facility: CLINIC | Age: 73
End: 2018-12-07
Payer: MEDICARE

## 2018-12-07 VITALS
BODY MASS INDEX: 23.88 KG/M2 | HEART RATE: 96 BPM | OXYGEN SATURATION: 99 % | TEMPERATURE: 99 F | SYSTOLIC BLOOD PRESSURE: 140 MMHG | HEIGHT: 65 IN | DIASTOLIC BLOOD PRESSURE: 80 MMHG | WEIGHT: 143.31 LBS

## 2018-12-07 DIAGNOSIS — I11.9 BENIGN HYPERTENSIVE HEART DISEASE WITHOUT CONGESTIVE HEART FAILURE: ICD-10-CM

## 2018-12-07 DIAGNOSIS — R05.9 COUGH: ICD-10-CM

## 2018-12-07 DIAGNOSIS — J06.9 ACUTE URI: Primary | ICD-10-CM

## 2018-12-07 PROCEDURE — 99999 PR PBB SHADOW E&M-EST. PATIENT-LVL IV: CPT | Mod: PBBFAC,,, | Performed by: PHYSICIAN ASSISTANT

## 2018-12-07 PROCEDURE — 99214 OFFICE O/P EST MOD 30 MIN: CPT | Mod: PBBFAC,PO | Performed by: PHYSICIAN ASSISTANT

## 2018-12-07 PROCEDURE — 99213 OFFICE O/P EST LOW 20 MIN: CPT | Mod: S$PBB,,, | Performed by: PHYSICIAN ASSISTANT

## 2018-12-07 RX ORDER — PROMETHAZINE HYDROCHLORIDE AND DEXTROMETHORPHAN HYDROBROMIDE 6.25; 15 MG/5ML; MG/5ML
5 SYRUP ORAL NIGHTLY PRN
Qty: 110 ML | Refills: 0 | Status: SHIPPED | OUTPATIENT
Start: 2018-12-07 | End: 2018-12-17

## 2018-12-07 RX ORDER — ALBUTEROL SULFATE 90 UG/1
2 AEROSOL, METERED RESPIRATORY (INHALATION) EVERY 6 HOURS PRN
Qty: 1 INHALER | Refills: 0 | Status: SHIPPED | OUTPATIENT
Start: 2018-12-07 | End: 2021-04-19

## 2018-12-07 RX ORDER — BENZONATATE 200 MG/1
200 CAPSULE ORAL 2 TIMES DAILY PRN
Qty: 20 CAPSULE | Refills: 0 | Status: SHIPPED | OUTPATIENT
Start: 2018-12-07 | End: 2018-12-17

## 2018-12-07 NOTE — PROGRESS NOTES
Subjective:       Patient ID: Iliana Mariano is a 73 y.o. female.    Chief Complaint: Sinusitis    Cough   This is a new problem. Episode onset: 2 days ago  The problem has been unchanged. The cough is non-productive. Associated symptoms include nasal congestion and rhinorrhea. Pertinent negatives include no chest pain, chills, ear congestion, ear pain, fever, headaches, heartburn, hemoptysis, myalgias, postnasal drip, rash, sore throat, shortness of breath, sweats, weight loss or wheezing. She has tried OTC cough suppressant for the symptoms. The treatment provided mild relief. There is no history of asthma, bronchiectasis, bronchitis, COPD, emphysema, environmental allergies or pneumonia.       Health Maintenance Due   Topic Date Due    DEXA SCAN  01/05/2019       Past Medical History:   Diagnosis Date    Gastritis     GERD (gastroesophageal reflux disease)     Glaucoma suspect with open angle 5-14-13    Hemorrhoid     Hiatal hernia     Hypertension 6/7/2013    Osteoarthritis 2011    Osteoporosis, unspecified     Other and unspecified hyperlipidemia     Unspecified essential hypertension     Unspecified hypothyroidism     Unspecified vitamin D deficiency        Current Outpatient Medications   Medication Sig Dispense Refill    aspirin (ECOTRIN) 81 MG EC tablet Take 81 mg by mouth once daily.      B-complex with vitamin C (Z-BEC OR EQUIV) tablet Take 1 tablet by mouth once daily.      dorzolamide (TRUSOPT) 2 % ophthalmic solution       dorzolamide-timolol 2-0.5% (COSOPT) 2-0.5 % ophthalmic solution Place 1 drop into both eyes every 12 (twelve) hours. 1 Bottle 6    ergocalciferol, vitamin D2, 1,000 unit Cap Take 1,000 Units by mouth.      levothyroxine (SYNTHROID) 100 MCG tablet Take 1 tablet (100 mcg total) by mouth once daily. 90 tablet 3    LORATADINE (CLARITIN ORAL) Take by mouth as needed.      metoprolol succinate (TOPROL-XL) 25 MG 24 hr tablet Take 0.5-1 tablets (12.5-25 mg total) by  "mouth once daily. 1/2 to 1 tab daily 90 tablet 3    mupirocin (BACTROBAN) 2 % ointment Apply topically 3 (three) times daily. 30 g 0    pantoprazole (PROTONIX) 40 MG tablet Take 1 tablet (40 mg total) by mouth once daily. 90 tablet 3    pravastatin (PRAVACHOL) 40 MG tablet Take 1 tablet (40 mg total) by mouth once daily. 90 tablet 3    triamterene-hydrochlorothiazide 37.5-25 mg (MAXZIDE-25) 37.5-25 mg per tablet Take 1.5 tablets by mouth once daily. 135 tablet 3    albuterol (VENTOLIN HFA) 90 mcg/actuation inhaler Inhale 2 puffs into the lungs every 6 (six) hours as needed for Wheezing. Rescue 1 Inhaler 0    benzonatate (TESSALON) 200 MG capsule Take 1 capsule (200 mg total) by mouth 2 (two) times daily as needed for Cough. 20 capsule 0    latanoprost 0.005 % ophthalmic solution Place 1 drop into both eyes every evening. 1 Bottle 6    promethazine-dextromethorphan (PROMETHAZINE-DM) 6.25-15 mg/5 mL Syrp Take 5 mLs by mouth nightly as needed. 110 mL 0     No current facility-administered medications for this visit.        Review of Systems   Constitutional: Negative for chills, fever and weight loss.   HENT: Positive for rhinorrhea. Negative for ear pain, postnasal drip and sore throat.    Respiratory: Positive for cough. Negative for hemoptysis, shortness of breath and wheezing.    Cardiovascular: Negative for chest pain.   Gastrointestinal: Negative for heartburn.   Musculoskeletal: Negative for myalgias.   Skin: Negative for rash.   Allergic/Immunologic: Negative for environmental allergies.   Neurological: Negative for headaches.       Objective:   BP (!) 140/80   Pulse 96   Temp 98.8 °F (37.1 °C) (Tympanic)   Ht 5' 5" (1.651 m)   Wt 65 kg (143 lb 4.8 oz)   SpO2 99%   BMI 23.85 kg/m²      Physical Exam   Constitutional: She is oriented to person, place, and time. She appears well-developed and well-nourished. No distress.   HENT:   Head: Normocephalic and atraumatic.   Right Ear: External ear normal. "   Left Ear: External ear normal.   Nose: Nose normal.   Mouth/Throat: Oropharynx is clear and moist.   Eyes: EOM are normal. Pupils are equal, round, and reactive to light.   Neck: Normal range of motion. Neck supple.   Cardiovascular: Normal rate and regular rhythm.   Pulmonary/Chest: Effort normal.   Abdominal: Soft.   Musculoskeletal: She exhibits no edema.   Neurological: She is alert and oriented to person, place, and time.   Skin: Capillary refill takes less than 2 seconds.   Psychiatric: She has a normal mood and affect. Her behavior is normal.         Lab Results   Component Value Date    WBC 7.35 07/25/2018    HGB 13.3 07/25/2018    HCT 42.0 07/25/2018     07/25/2018    CHOL 193 07/25/2018    TRIG 117 07/25/2018    HDL 68 07/25/2018    ALT 17 07/25/2018    AST 20 07/25/2018     07/25/2018    K 3.9 07/25/2018     07/25/2018    CREATININE 1.0 07/25/2018    BUN 13 07/25/2018    CO2 25 07/25/2018    TSH 6.609 (H) 07/25/2018    HGBA1C 5.5 07/18/2017       Assessment:       1. Acute URI    2. Cough    3. Benign hypertensive heart disease without congestive heart failure        Plan:   Acute URI  tx per orders, supportive care   Cough   hypertension- avoid decongestants   -     albuterol (VENTOLIN HFA) 90 mcg/actuation inhaler; Inhale 2 puffs into the lungs every 6 (six) hours as needed for Wheezing. Rescue  Dispense: 1 Inhaler; Refill: 0  -     promethazine-dextromethorphan (PROMETHAZINE-DM) 6.25-15 mg/5 mL Syrp; Take 5 mLs by mouth nightly as needed.  Dispense: 110 mL; Refill: 0  -     benzonatate (TESSALON) 200 MG capsule; Take 1 capsule (200 mg total) by mouth 2 (two) times daily as needed for Cough.  Dispense: 20 capsule; Refill: 0        No Follow-up on file.

## 2019-06-24 ENCOUNTER — LAB VISIT (OUTPATIENT)
Dept: LAB | Facility: HOSPITAL | Age: 74
End: 2019-06-24
Attending: FAMILY MEDICINE
Payer: MEDICARE

## 2019-06-24 DIAGNOSIS — E55.9 VITAMIN D DEFICIENCY: ICD-10-CM

## 2019-06-24 DIAGNOSIS — E03.4 HYPOTHYROIDISM DUE TO ACQUIRED ATROPHY OF THYROID: ICD-10-CM

## 2019-06-24 DIAGNOSIS — E78.5 DYSLIPIDEMIA: ICD-10-CM

## 2019-06-24 DIAGNOSIS — I11.9 HEART DISEASE, HYPERTENSIVE, BENIGN, WITHOUT HEART FAILURE: ICD-10-CM

## 2019-06-24 LAB
25(OH)D3+25(OH)D2 SERPL-MCNC: 40 NG/ML (ref 30–96)
ALBUMIN SERPL BCP-MCNC: 3.7 G/DL (ref 3.5–5.2)
ALP SERPL-CCNC: 84 U/L (ref 55–135)
ALT SERPL W/O P-5'-P-CCNC: 23 U/L (ref 10–44)
ANION GAP SERPL CALC-SCNC: 13 MMOL/L (ref 8–16)
AST SERPL-CCNC: 28 U/L (ref 10–40)
BASOPHILS # BLD AUTO: 0.04 K/UL (ref 0–0.2)
BASOPHILS NFR BLD: 0.6 % (ref 0–1.9)
BILIRUB SERPL-MCNC: 0.5 MG/DL (ref 0.1–1)
BUN SERPL-MCNC: 13 MG/DL (ref 8–23)
CALCIUM SERPL-MCNC: 10.6 MG/DL (ref 8.7–10.5)
CHLORIDE SERPL-SCNC: 104 MMOL/L (ref 95–110)
CHOLEST SERPL-MCNC: 183 MG/DL (ref 120–199)
CHOLEST/HDLC SERPL: 2.5 {RATIO} (ref 2–5)
CO2 SERPL-SCNC: 27 MMOL/L (ref 23–29)
CREAT SERPL-MCNC: 0.9 MG/DL (ref 0.5–1.4)
DIFFERENTIAL METHOD: ABNORMAL
EOSINOPHIL # BLD AUTO: 0.1 K/UL (ref 0–0.5)
EOSINOPHIL NFR BLD: 1.8 % (ref 0–8)
ERYTHROCYTE [DISTWIDTH] IN BLOOD BY AUTOMATED COUNT: 12.9 % (ref 11.5–14.5)
EST. GFR  (AFRICAN AMERICAN): >60 ML/MIN/1.73 M^2
EST. GFR  (NON AFRICAN AMERICAN): >60 ML/MIN/1.73 M^2
GLUCOSE SERPL-MCNC: 85 MG/DL (ref 70–110)
HCT VFR BLD AUTO: 43.9 % (ref 37–48.5)
HDLC SERPL-MCNC: 74 MG/DL (ref 40–75)
HDLC SERPL: 40.4 % (ref 20–50)
HGB BLD-MCNC: 13.5 G/DL (ref 12–16)
IMM GRANULOCYTES # BLD AUTO: 0.01 K/UL (ref 0–0.04)
IMM GRANULOCYTES NFR BLD AUTO: 0.1 % (ref 0–0.5)
LDLC SERPL CALC-MCNC: 94.6 MG/DL (ref 63–159)
LYMPHOCYTES # BLD AUTO: 2.1 K/UL (ref 1–4.8)
LYMPHOCYTES NFR BLD: 29.5 % (ref 18–48)
MCH RBC QN AUTO: 30.4 PG (ref 27–31)
MCHC RBC AUTO-ENTMCNC: 30.8 G/DL (ref 32–36)
MCV RBC AUTO: 99 FL (ref 82–98)
MONOCYTES # BLD AUTO: 0.6 K/UL (ref 0.3–1)
MONOCYTES NFR BLD: 8 % (ref 4–15)
NEUTROPHILS # BLD AUTO: 4.4 K/UL (ref 1.8–7.7)
NEUTROPHILS NFR BLD: 60 % (ref 38–73)
NONHDLC SERPL-MCNC: 109 MG/DL
NRBC BLD-RTO: 0 /100 WBC
PLATELET # BLD AUTO: 261 K/UL (ref 150–350)
PMV BLD AUTO: 10.2 FL (ref 9.2–12.9)
POTASSIUM SERPL-SCNC: 4.5 MMOL/L (ref 3.5–5.1)
PROT SERPL-MCNC: 7.1 G/DL (ref 6–8.4)
RBC # BLD AUTO: 4.44 M/UL (ref 4–5.4)
SODIUM SERPL-SCNC: 144 MMOL/L (ref 136–145)
T4 FREE SERPL-MCNC: 1.09 NG/DL (ref 0.71–1.51)
TRIGL SERPL-MCNC: 72 MG/DL (ref 30–150)
TSH SERPL DL<=0.005 MIU/L-ACNC: 1.91 UIU/ML (ref 0.4–4)
WBC # BLD AUTO: 7.26 K/UL (ref 3.9–12.7)

## 2019-06-24 PROCEDURE — 85025 COMPLETE CBC W/AUTO DIFF WBC: CPT | Mod: HCNC

## 2019-06-24 PROCEDURE — 80061 LIPID PANEL: CPT | Mod: HCNC

## 2019-06-24 PROCEDURE — 36415 COLL VENOUS BLD VENIPUNCTURE: CPT | Mod: HCNC,PO

## 2019-06-24 PROCEDURE — 80053 COMPREHEN METABOLIC PANEL: CPT | Mod: HCNC

## 2019-06-24 PROCEDURE — 84439 ASSAY OF FREE THYROXINE: CPT | Mod: HCNC

## 2019-06-24 PROCEDURE — 82306 VITAMIN D 25 HYDROXY: CPT | Mod: HCNC

## 2019-06-24 PROCEDURE — 84443 ASSAY THYROID STIM HORMONE: CPT | Mod: HCNC

## 2019-07-01 ENCOUNTER — OFFICE VISIT (OUTPATIENT)
Dept: INTERNAL MEDICINE | Facility: CLINIC | Age: 74
End: 2019-07-01
Payer: MEDICARE

## 2019-07-01 VITALS
DIASTOLIC BLOOD PRESSURE: 80 MMHG | BODY MASS INDEX: 23.36 KG/M2 | WEIGHT: 140.19 LBS | HEIGHT: 65 IN | TEMPERATURE: 99 F | SYSTOLIC BLOOD PRESSURE: 122 MMHG | HEART RATE: 70 BPM

## 2019-07-01 DIAGNOSIS — E03.4 HYPOTHYROIDISM DUE TO ACQUIRED ATROPHY OF THYROID: ICD-10-CM

## 2019-07-01 DIAGNOSIS — K44.9 HIATAL HERNIA: ICD-10-CM

## 2019-07-01 DIAGNOSIS — Z12.31 ENCOUNTER FOR SCREENING MAMMOGRAM FOR BREAST CANCER: ICD-10-CM

## 2019-07-01 DIAGNOSIS — M70.62 TROCHANTERIC BURSITIS OF BOTH HIPS: ICD-10-CM

## 2019-07-01 DIAGNOSIS — M70.61 TROCHANTERIC BURSITIS OF BOTH HIPS: ICD-10-CM

## 2019-07-01 DIAGNOSIS — M85.80 OSTEOPENIA, UNSPECIFIED LOCATION: ICD-10-CM

## 2019-07-01 DIAGNOSIS — I11.9 BENIGN HYPERTENSIVE HEART DISEASE WITHOUT CONGESTIVE HEART FAILURE: ICD-10-CM

## 2019-07-01 DIAGNOSIS — Z00.00 ROUTINE GENERAL MEDICAL EXAMINATION AT A HEALTH CARE FACILITY: Primary | ICD-10-CM

## 2019-07-01 DIAGNOSIS — E55.9 VITAMIN D DEFICIENCY: ICD-10-CM

## 2019-07-01 DIAGNOSIS — E78.5 DYSLIPIDEMIA: ICD-10-CM

## 2019-07-01 DIAGNOSIS — M81.0 OSTEOPOROSIS, UNSPECIFIED OSTEOPOROSIS TYPE, UNSPECIFIED PATHOLOGICAL FRACTURE PRESENCE: ICD-10-CM

## 2019-07-01 DIAGNOSIS — R05.9 COUGH: ICD-10-CM

## 2019-07-01 PROCEDURE — 99999 PR PBB SHADOW E&M-EST. PATIENT-LVL III: CPT | Mod: PBBFAC,HCNC,, | Performed by: FAMILY MEDICINE

## 2019-07-01 PROCEDURE — 99397 PR PREVENTIVE VISIT,EST,65 & OVER: ICD-10-PCS | Mod: HCNC,S$GLB,, | Performed by: FAMILY MEDICINE

## 2019-07-01 PROCEDURE — 3079F PR MOST RECENT DIASTOLIC BLOOD PRESSURE 80-89 MM HG: ICD-10-PCS | Mod: HCNC,CPTII,S$GLB, | Performed by: FAMILY MEDICINE

## 2019-07-01 PROCEDURE — 99999 PR PBB SHADOW E&M-EST. PATIENT-LVL III: ICD-10-PCS | Mod: PBBFAC,HCNC,, | Performed by: FAMILY MEDICINE

## 2019-07-01 PROCEDURE — 3079F DIAST BP 80-89 MM HG: CPT | Mod: HCNC,CPTII,S$GLB, | Performed by: FAMILY MEDICINE

## 2019-07-01 PROCEDURE — 99397 PER PM REEVAL EST PAT 65+ YR: CPT | Mod: HCNC,S$GLB,, | Performed by: FAMILY MEDICINE

## 2019-07-01 PROCEDURE — 3074F SYST BP LT 130 MM HG: CPT | Mod: HCNC,CPTII,S$GLB, | Performed by: FAMILY MEDICINE

## 2019-07-01 PROCEDURE — 3074F PR MOST RECENT SYSTOLIC BLOOD PRESSURE < 130 MM HG: ICD-10-PCS | Mod: HCNC,CPTII,S$GLB, | Performed by: FAMILY MEDICINE

## 2019-07-02 NOTE — PROGRESS NOTES
Subjective:      Patient ID: Iliana Mariano is a 73 y.o. female.    Chief Complaint: Follow-up (6 mo ) and Annual Exam    Disclaimer:  This note is prepared using voice recognition software and as such is likely to have errors and has not been proof read. Please contact me for questions.     Patient is here today for followup of multiple issues and annual exam.    htn- controlled. Taking Triamterene / hctz   No problems with medication..     She does have a history of shingles.  Left arm is good, but occasinoally with the left hand with tingling. Still doing needle work. Is good candidate for the new shingles vaccine.  Willing to get through her pharmacy.    Patient also has a history of hypothyroidism.  Currently now on 100 mcg doing the generic.  Labs reviewed.  She has a history of vitamin D deficiency.  She's currently on over-the-counter supplementation.  Her insurance won't allow us to check it more than once per year.  1000IU daily.     Hyperlipidemia-currently controlled with statin.  Pravastatin 40mg.     Reports that she does have a chronic cough though.  Has high history of a hiatal hernia.  Finds that is worse at night.  Has been taking her PPI therapy in the morning with her thyroid supplement.  Willing to adjust.  Much more active now.  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    She is however concerned about her .  He is being much more forgetful lately      She does report lately though that she has been having some discomfort in her hips.  Mainly on the lateral aspects.  Worse when she gets up.  Feels that it may be more bursitis related.            Lab Results   Component Value Date    WBC 7.26 06/24/2019    HGB 13.5 06/24/2019    HCT 43.9 06/24/2019     06/24/2019    CHOL 183 06/24/2019    TRIG 72 06/24/2019    HDL 74 06/24/2019    ALT 23 06/24/2019    AST 28 06/24/2019     06/24/2019    K 4.5 06/24/2019     06/24/2019     CREATININE 0.9 06/24/2019    BUN 13 06/24/2019    CO2 27 06/24/2019    TSH 1.906 06/24/2019    HGBA1C 5.5 07/18/2017       Mammo Digital Screening Bilat with Tomosynthesis_CAD  Result:  Mammo Digital Screening Bilat with Tomosynthesis_CAD    History:  Patient is 71 y.o. and is seen for encounter for screening mammogram for   malignant neoplasm of breast.    Films Compared:  Compared to: 06/30/2015 Mammo Digital Screening Bilat with CAD (No   Change), and 12/20/2013 Mammo Digital Screening Bilat with CAD (No Change)    Findings:  Computer-aided detection was utilized in the interpretation of this   examination. This procedure was performed using tomosynthesis.       The breasts have scattered areas of fibroglandular density. There is no   evidence of suspicious masses, microcalcifications or architectural   distortion.    Impression:  No mammographic evidence of malignancy.    BI-RADS Category 1: Negative    Recommendation:  Routine Screening Mammogram in 1 year is recommended    The patient's estimated lifetime risk of breast cancer (to age 85) based   on Tyrer-Cuzick - 7 risk assessment model is: Tyrer-Cuzick: 3.26 %.   According to the [employer] Society,  patients with a lifetime breast   cancer risk of 20% or higher might benefit from supplemental screening   tests.        Review of Systems   Constitutional: Negative for activity change, appetite change, chills, fatigue and fever.   HENT: Negative for ear pain and trouble swallowing.    Eyes: Negative for pain and visual disturbance.   Respiratory: Positive for cough. Negative for shortness of breath.    Cardiovascular: Negative for chest pain and leg swelling.   Gastrointestinal: Negative for abdominal pain, blood in stool, nausea and vomiting.   Endocrine: Negative for cold intolerance and heat intolerance.   Genitourinary: Negative for dysuria and frequency.   Musculoskeletal: Negative for joint swelling, myalgias and neck pain.   Skin: Negative for  "color change and rash.   Neurological: Negative for dizziness and headaches.   Psychiatric/Behavioral: Negative for behavioral problems and sleep disturbance.     Objective:     Vitals:    07/01/19 1045   BP: 122/80   BP Location: Left arm   Patient Position: Sitting   BP Method: Large (Manual)   Pulse: 70   Temp: 98.7 °F (37.1 °C)   TempSrc: Oral   Weight: 63.6 kg (140 lb 3.4 oz)   Height: 5' 5" (1.651 m)     Physical Exam   Constitutional: She is oriented to person, place, and time. She appears well-developed and well-nourished.   HENT:   Head: Normocephalic and atraumatic.   Right Ear: External ear normal.   Left Ear: External ear normal.   Mouth/Throat: Oropharynx is clear and moist.   Eyes: EOM are normal.   Neck: Normal range of motion. Neck supple. No thyromegaly present.   Cardiovascular: Normal rate and regular rhythm. Exam reveals no gallop and no friction rub.   No murmur heard.  Pulmonary/Chest: Effort normal. No respiratory distress. She has no wheezes. She has no rales.   Abdominal: Soft. Bowel sounds are normal. She exhibits no distension. There is no tenderness. There is no rebound.   Musculoskeletal: Normal range of motion. She exhibits no edema.        Right hip: She exhibits tenderness and bony tenderness. She exhibits normal range of motion and normal strength.        Left hip: She exhibits tenderness and bony tenderness. She exhibits normal range of motion and normal strength.   Lymphadenopathy:     She has no cervical adenopathy.   Neurological: She is alert and oriented to person, place, and time.   Skin: Skin is warm and dry. No rash noted.   Psychiatric: She has a normal mood and affect. Her behavior is normal. Judgment and thought content normal.   Vitals reviewed.    Assessment:     1. Routine general medical examination at a health care facility    2. Hypothyroidism due to acquired atrophy of thyroid    3. Dyslipidemia    4. Vitamin D deficiency    5. Hiatal hernia    6. Osteoporosis, " unspecified osteoporosis type, unspecified pathological fracture presence    7. Benign hypertensive heart disease without congestive heart failure    8. Cough    9. Trochanteric bursitis of both hips    10. Hypothyroidism due to acquired atrophy of thyroid    11. Encounter for screening mammogram for breast cancer    12. Osteopenia, unspecified location      Plan:   Iliana was seen today for follow-up and annual exam.    Diagnoses and all orders for this visit:    Routine general medical examination at a health care facility-labs reviewed discussed health maintenance discussed prevention measures including bone density reduction of falls vaccines    Hypothyroidism due to acquired atrophy of thyroid stable at this time continue with current supplementation    Dyslipidemia stable at this time continue current statin therapy labs reviewed    Vitamin D deficiency stable at this time with over-the-counter supplementation    Hiatal hernia discussed dosing her PPI therapy 30 min prior to her larger meal in the evening also discussed smaller meals throughout the day    Osteoporosis, unspecified osteoporosis type, unspecified pathological fracture presence stable at this time perform bone density    Benign hypertensive heart disease without congestive heart failure stable this time    Cough  Ongoing suspect related more to hiatal hernia symptoms versus also postnasal drip can try Singulair  Trochanteric bursitis of both hips-often on willing to use Biofreeze willing to do stretching exercises    Hypothyroidism due to acquired atrophy of thyroid  Comments:   refilled meds. labs today. stable    Encounter for screening mammogram for breast cancer placed orders today-     Mammo Digital Screening Bilat; Future    Osteopenia, unspecified location  -     DXA Bone Density Spine And Hip; Future            Follow up in about 1 year (around 7/1/2020) for physical with Dr DWYER.    Patient Instructions   Shingrix vaccine is the new  shingles vaccine. Ask at pharmacy.  2 shots, not live, covered by insurance. 90 % effective against Shingles. Can start it now at age 50. Not doing the old Zostavax anymore. Even if you got zostavax, it is recommended to get the new shingles vaccine.

## 2019-08-05 ENCOUNTER — HOSPITAL ENCOUNTER (OUTPATIENT)
Dept: RADIOLOGY | Facility: HOSPITAL | Age: 74
Discharge: HOME | End: 2019-08-05
Attending: FAMILY MEDICINE
Payer: MEDICARE

## 2019-08-05 DIAGNOSIS — Z12.31 ENCOUNTER FOR SCREENING MAMMOGRAM FOR BREAST CANCER: ICD-10-CM

## 2019-08-05 PROCEDURE — 77067 SCR MAMMO BI INCL CAD: CPT | Mod: 26,HCNC,, | Performed by: RADIOLOGY

## 2019-08-05 PROCEDURE — 77063 BREAST TOMOSYNTHESIS BI: CPT | Mod: 26,HCNC,, | Performed by: RADIOLOGY

## 2019-08-05 PROCEDURE — 77063 MAMMO DIGITAL SCREENING BILAT WITH TOMOSYNTHESIS_CAD: ICD-10-PCS | Mod: 26,HCNC,, | Performed by: RADIOLOGY

## 2019-08-05 PROCEDURE — 77067 MAMMO DIGITAL SCREENING BILAT WITH TOMOSYNTHESIS_CAD: ICD-10-PCS | Mod: 26,HCNC,, | Performed by: RADIOLOGY

## 2019-08-05 PROCEDURE — 77067 SCR MAMMO BI INCL CAD: CPT | Mod: TC,HCNC

## 2019-08-12 DIAGNOSIS — E78.5 DYSLIPIDEMIA: ICD-10-CM

## 2019-08-12 DIAGNOSIS — I11.9 HEART DISEASE, HYPERTENSIVE, BENIGN, WITHOUT HEART FAILURE: ICD-10-CM

## 2019-08-13 RX ORDER — TRIAMTERENE/HYDROCHLOROTHIAZID 37.5-25 MG
TABLET ORAL
Qty: 135 TABLET | Refills: 3 | Status: SHIPPED | OUTPATIENT
Start: 2019-08-13 | End: 2020-10-19

## 2019-08-13 RX ORDER — PRAVASTATIN SODIUM 40 MG/1
TABLET ORAL
Qty: 90 TABLET | Refills: 3 | Status: SHIPPED | OUTPATIENT
Start: 2019-08-13 | End: 2020-10-23

## 2019-10-25 DIAGNOSIS — E03.4 HYPOTHYROIDISM DUE TO ACQUIRED ATROPHY OF THYROID: ICD-10-CM

## 2019-10-27 RX ORDER — METOPROLOL SUCCINATE 25 MG/1
TABLET, EXTENDED RELEASE ORAL
Qty: 90 TABLET | Refills: 3 | Status: SHIPPED | OUTPATIENT
Start: 2019-10-27 | End: 2021-09-17 | Stop reason: SDUPTHER

## 2019-10-27 RX ORDER — LEVOTHYROXINE SODIUM 100 UG/1
TABLET ORAL
Qty: 90 TABLET | Refills: 3 | Status: SHIPPED | OUTPATIENT
Start: 2019-10-27 | End: 2021-01-25

## 2019-10-31 ENCOUNTER — PES CALL (OUTPATIENT)
Dept: ADMINISTRATIVE | Facility: CLINIC | Age: 74
End: 2019-10-31

## 2019-12-11 RX ORDER — PANTOPRAZOLE SODIUM 40 MG/1
TABLET, DELAYED RELEASE ORAL
Qty: 90 TABLET | Refills: 3 | Status: SHIPPED | OUTPATIENT
Start: 2019-12-11 | End: 2021-01-25

## 2020-08-06 ENCOUNTER — OFFICE VISIT (OUTPATIENT)
Dept: INTERNAL MEDICINE | Facility: CLINIC | Age: 75
End: 2020-08-06
Payer: MEDICARE

## 2020-08-06 ENCOUNTER — HOSPITAL ENCOUNTER (OUTPATIENT)
Dept: RADIOLOGY | Facility: HOSPITAL | Age: 75
Discharge: HOME | End: 2020-08-06
Attending: PHYSICIAN ASSISTANT
Payer: MEDICARE

## 2020-08-06 VITALS
WEIGHT: 146.38 LBS | BODY MASS INDEX: 24.39 KG/M2 | HEIGHT: 65 IN | DIASTOLIC BLOOD PRESSURE: 80 MMHG | HEART RATE: 88 BPM | TEMPERATURE: 99 F | SYSTOLIC BLOOD PRESSURE: 150 MMHG

## 2020-08-06 DIAGNOSIS — S22.42XA CLOSED FRACTURE OF MULTIPLE RIBS OF LEFT SIDE, INITIAL ENCOUNTER: ICD-10-CM

## 2020-08-06 DIAGNOSIS — R07.9 TRAUMATIC CHEST PAIN: ICD-10-CM

## 2020-08-06 DIAGNOSIS — R07.9 TRAUMATIC CHEST PAIN: Primary | ICD-10-CM

## 2020-08-06 PROCEDURE — 99999 PR PBB SHADOW E&M-EST. PATIENT-LVL IV: CPT | Mod: PBBFAC,HCNC,, | Performed by: PHYSICIAN ASSISTANT

## 2020-08-06 PROCEDURE — 3288F PR FALLS RISK ASSESSMENT DOCUMENTED: ICD-10-PCS | Mod: HCNC,CPTII,S$GLB, | Performed by: PHYSICIAN ASSISTANT

## 2020-08-06 PROCEDURE — 99214 OFFICE O/P EST MOD 30 MIN: CPT | Mod: HCNC,S$GLB,, | Performed by: PHYSICIAN ASSISTANT

## 2020-08-06 PROCEDURE — 1100F PR PT FALLS ASSESS DOC 2+ FALLS/FALL W/INJURY/YR: ICD-10-PCS | Mod: HCNC,CPTII,S$GLB, | Performed by: PHYSICIAN ASSISTANT

## 2020-08-06 PROCEDURE — 71100 XR RIBS 2 VIEW LEFT: ICD-10-PCS | Mod: 26,HCNC,LT, | Performed by: RADIOLOGY

## 2020-08-06 PROCEDURE — 3077F PR MOST RECENT SYSTOLIC BLOOD PRESSURE >= 140 MM HG: ICD-10-PCS | Mod: HCNC,CPTII,S$GLB, | Performed by: PHYSICIAN ASSISTANT

## 2020-08-06 PROCEDURE — 1159F PR MEDICATION LIST DOCUMENTED IN MEDICAL RECORD: ICD-10-PCS | Mod: HCNC,S$GLB,, | Performed by: PHYSICIAN ASSISTANT

## 2020-08-06 PROCEDURE — 71100 X-RAY EXAM RIBS UNI 2 VIEWS: CPT | Mod: TC,HCNC,FY,PO,LT

## 2020-08-06 PROCEDURE — 99214 PR OFFICE/OUTPT VISIT, EST, LEVL IV, 30-39 MIN: ICD-10-PCS | Mod: HCNC,S$GLB,, | Performed by: PHYSICIAN ASSISTANT

## 2020-08-06 PROCEDURE — 3008F PR BODY MASS INDEX (BMI) DOCUMENTED: ICD-10-PCS | Mod: HCNC,CPTII,S$GLB, | Performed by: PHYSICIAN ASSISTANT

## 2020-08-06 PROCEDURE — 1125F AMNT PAIN NOTED PAIN PRSNT: CPT | Mod: HCNC,S$GLB,, | Performed by: PHYSICIAN ASSISTANT

## 2020-08-06 PROCEDURE — 3079F DIAST BP 80-89 MM HG: CPT | Mod: HCNC,CPTII,S$GLB, | Performed by: PHYSICIAN ASSISTANT

## 2020-08-06 PROCEDURE — 1125F PR PAIN SEVERITY QUANTIFIED, PAIN PRESENT: ICD-10-PCS | Mod: HCNC,S$GLB,, | Performed by: PHYSICIAN ASSISTANT

## 2020-08-06 PROCEDURE — 71100 X-RAY EXAM RIBS UNI 2 VIEWS: CPT | Mod: 26,HCNC,LT, | Performed by: RADIOLOGY

## 2020-08-06 PROCEDURE — 1100F PTFALLS ASSESS-DOCD GE2>/YR: CPT | Mod: HCNC,CPTII,S$GLB, | Performed by: PHYSICIAN ASSISTANT

## 2020-08-06 PROCEDURE — 3079F PR MOST RECENT DIASTOLIC BLOOD PRESSURE 80-89 MM HG: ICD-10-PCS | Mod: HCNC,CPTII,S$GLB, | Performed by: PHYSICIAN ASSISTANT

## 2020-08-06 PROCEDURE — 3077F SYST BP >= 140 MM HG: CPT | Mod: HCNC,CPTII,S$GLB, | Performed by: PHYSICIAN ASSISTANT

## 2020-08-06 PROCEDURE — 99999 PR PBB SHADOW E&M-EST. PATIENT-LVL IV: ICD-10-PCS | Mod: PBBFAC,HCNC,, | Performed by: PHYSICIAN ASSISTANT

## 2020-08-06 PROCEDURE — 1159F MED LIST DOCD IN RCRD: CPT | Mod: HCNC,S$GLB,, | Performed by: PHYSICIAN ASSISTANT

## 2020-08-06 PROCEDURE — 3008F BODY MASS INDEX DOCD: CPT | Mod: HCNC,CPTII,S$GLB, | Performed by: PHYSICIAN ASSISTANT

## 2020-08-06 PROCEDURE — 3288F FALL RISK ASSESSMENT DOCD: CPT | Mod: HCNC,CPTII,S$GLB, | Performed by: PHYSICIAN ASSISTANT

## 2020-08-06 NOTE — PROGRESS NOTES
Subjective:       Patient ID: Iliana Mariano is a 74 y.o. female.    Chief Complaint: Chest Pain (left)    HPI      Patient comes in today for rib pain     She fell out of bed reachign for her alarm   thsi was two weeks ago, fell on to carpart on left side under arm   Now having pain on that side  Est with breathing or coughing     No fever or shortness of breath or signs of  Infection   Health Maintenance Due   Topic Date Due    Lipid Panel  06/24/2020       Past Medical History:   Diagnosis Date    Gastritis     GERD (gastroesophageal reflux disease)     Glaucoma suspect with open angle 5-14-13    Hemorrhoid     Hiatal hernia     Hypertension 6/7/2013    Osteoarthritis 2011    Osteoporosis, unspecified     Other and unspecified hyperlipidemia     Unspecified essential hypertension     Unspecified hypothyroidism     Unspecified vitamin D deficiency        Current Outpatient Medications   Medication Sig Dispense Refill    B-complex with vitamin C (Z-BEC OR EQUIV) tablet Take 1 tablet by mouth once daily.      ergocalciferol, vitamin D2, 1,000 unit Cap Take 1,000 Units by mouth.      levothyroxine (SYNTHROID) 100 MCG tablet TAKE 1 TABLET BY MOUTH ONCE DAILY 90 tablet 3    LORATADINE (CLARITIN ORAL) Take by mouth as needed.      metoprolol succinate (TOPROL-XL) 25 MG 24 hr tablet TAKE 1/2 TO 1 (ONE-HALF TO ONE) TABLET BY MOUTH ONCE DAILY 90 tablet 3    pantoprazole (PROTONIX) 40 MG tablet TAKE 1 TABLET BY MOUTH ONCE DAILY 90 tablet 3    pravastatin (PRAVACHOL) 40 MG tablet TAKE 1 TABLET BY MOUTH ONCE DAILY 90 tablet 3    triamterene-hydrochlorothiazide 37.5-25 mg (MAXZIDE-25) 37.5-25 mg per tablet TAKE 1 & 1/2 (ONE & ONE-HALF) TABLETS BY MOUTH ONCE DAILY 135 tablet 3    albuterol (VENTOLIN HFA) 90 mcg/actuation inhaler Inhale 2 puffs into the lungs every 6 (six) hours as needed for Wheezing. Rescue (Patient not taking: Reported on 8/6/2020) 1 Inhaler 0    aspirin (ECOTRIN) 81 MG EC tablet Take  "81 mg by mouth once daily.      latanoprost 0.005 % ophthalmic solution Place 1 drop into both eyes every evening. 1 Bottle 6     No current facility-administered medications for this visit.        Review of Systems   Constitutional: Negative for fatigue, fever and unexpected weight change.   HENT: Negative for sore throat and trouble swallowing.    Respiratory: Negative for cough and shortness of breath.    Cardiovascular: Positive for chest pain.   Gastrointestinal: Negative for abdominal pain.   Hematological: Negative for adenopathy. Does not bruise/bleed easily.   All other systems reviewed and are negative.      Objective:   BP (!) 150/80   Pulse 88   Temp 98.8 °F (37.1 °C) (Temporal)   Ht 5' 5" (1.651 m)   Wt 66.4 kg (146 lb 6.2 oz)   BMI 24.36 kg/m²      Physical Exam  Constitutional:       Appearance: Normal appearance. She is normal weight.   Chest:       Neurological:      Mental Status: She is alert.           Lab Results   Component Value Date    WBC 7.26 06/24/2019    HGB 13.5 06/24/2019    HCT 43.9 06/24/2019     06/24/2019    CHOL 183 06/24/2019    TRIG 72 06/24/2019    HDL 74 06/24/2019    ALT 23 06/24/2019    AST 28 06/24/2019     06/24/2019    K 4.5 06/24/2019     06/24/2019    CREATININE 0.9 06/24/2019    BUN 13 06/24/2019    CO2 27 06/24/2019    TSH 1.906 06/24/2019    HGBA1C 5.5 07/18/2017       Assessment:       1. Traumatic chest pain    2. Closed fracture of multiple ribs of left side, initial encounter        Plan:   Traumatic chest pain  -     X-Ray Ribs 2 View Left; Future; Expected date: 08/06/2020    Closed fracture of multiple ribs of left side, initial encounter    Has 2 ribs fx   Rest, deep breathing practices   Aleve/tylenol     Follow up for annual soon with labs prior           "

## 2020-09-14 ENCOUNTER — OFFICE VISIT (OUTPATIENT)
Dept: INTERNAL MEDICINE | Facility: CLINIC | Age: 75
End: 2020-09-14
Payer: MEDICARE

## 2020-09-14 VITALS
HEART RATE: 76 BPM | SYSTOLIC BLOOD PRESSURE: 126 MMHG | TEMPERATURE: 98 F | HEIGHT: 65 IN | BODY MASS INDEX: 24.46 KG/M2 | WEIGHT: 146.81 LBS | DIASTOLIC BLOOD PRESSURE: 78 MMHG

## 2020-09-14 DIAGNOSIS — R79.9 ABNORMAL FINDING OF BLOOD CHEMISTRY, UNSPECIFIED: ICD-10-CM

## 2020-09-14 DIAGNOSIS — M81.0 OSTEOPOROSIS, UNSPECIFIED OSTEOPOROSIS TYPE, UNSPECIFIED PATHOLOGICAL FRACTURE PRESENCE: ICD-10-CM

## 2020-09-14 DIAGNOSIS — E78.5 DYSLIPIDEMIA: Primary | ICD-10-CM

## 2020-09-14 DIAGNOSIS — K44.9 HIATAL HERNIA: ICD-10-CM

## 2020-09-14 DIAGNOSIS — E55.9 VITAMIN D DEFICIENCY: ICD-10-CM

## 2020-09-14 DIAGNOSIS — E03.4 HYPOTHYROIDISM DUE TO ACQUIRED ATROPHY OF THYROID: ICD-10-CM

## 2020-09-14 DIAGNOSIS — I11.9 BENIGN HYPERTENSIVE HEART DISEASE WITHOUT CONGESTIVE HEART FAILURE: ICD-10-CM

## 2020-09-14 PROCEDURE — 3078F DIAST BP <80 MM HG: CPT | Mod: HCNC,CPTII,S$GLB, | Performed by: NURSE PRACTITIONER

## 2020-09-14 PROCEDURE — 99397 PER PM REEVAL EST PAT 65+ YR: CPT | Mod: HCNC,S$GLB,, | Performed by: NURSE PRACTITIONER

## 2020-09-14 PROCEDURE — 99999 PR PBB SHADOW E&M-EST. PATIENT-LVL IV: CPT | Mod: PBBFAC,HCNC,, | Performed by: NURSE PRACTITIONER

## 2020-09-14 PROCEDURE — 3078F PR MOST RECENT DIASTOLIC BLOOD PRESSURE < 80 MM HG: ICD-10-PCS | Mod: HCNC,CPTII,S$GLB, | Performed by: NURSE PRACTITIONER

## 2020-09-14 PROCEDURE — 99397 PR PREVENTIVE VISIT,EST,65 & OVER: ICD-10-PCS | Mod: HCNC,S$GLB,, | Performed by: NURSE PRACTITIONER

## 2020-09-14 PROCEDURE — 99999 PR PBB SHADOW E&M-EST. PATIENT-LVL IV: ICD-10-PCS | Mod: PBBFAC,HCNC,, | Performed by: NURSE PRACTITIONER

## 2020-09-14 PROCEDURE — 3074F SYST BP LT 130 MM HG: CPT | Mod: HCNC,CPTII,S$GLB, | Performed by: NURSE PRACTITIONER

## 2020-09-14 PROCEDURE — 3074F PR MOST RECENT SYSTOLIC BLOOD PRESSURE < 130 MM HG: ICD-10-PCS | Mod: HCNC,CPTII,S$GLB, | Performed by: NURSE PRACTITIONER

## 2020-09-14 NOTE — PROGRESS NOTES
"Subjective:       Patient ID: Iliana Mariano is a 74 y.o. female.    Chief Complaint: Annual Exam    74 year old female here for wellness/chronic issues  Works at [employer] in [place]  Needs to have labs done  Sleep is good, no issues  Mood stable  Bowels move fine, no issues  States she gets lightheaded when standing when she works outside/does yard work. We discussed adequate hydration and changing positions slowly  Has hx of osteoporosis, tried meds for it and could not tolerate it. Admits she takes calcium and vitamin D        /78   Pulse 76   Temp 97.7 °F (36.5 °C) (Temporal)   Ht 5' 5" (1.651 m)   Wt 66.6 kg (146 lb 13.2 oz)   BMI 24.43 kg/m²     Review of Systems   Constitutional: Negative for activity change, appetite change, chills, diaphoresis, fatigue, fever and unexpected weight change.   HENT: Negative.    Eyes: Negative for visual disturbance.   Respiratory: Negative for cough, shortness of breath and wheezing.    Cardiovascular: Negative for chest pain, palpitations and leg swelling.   Gastrointestinal: Negative for abdominal distention, abdominal pain, blood in stool, constipation, diarrhea, nausea and vomiting.   Genitourinary: Negative for decreased urine volume, difficulty urinating, dysuria, frequency, hematuria and urgency.   Neurological: Negative.  Negative for dizziness, syncope, speech difficulty, light-headedness and headaches.   Psychiatric/Behavioral: Negative for agitation, confusion and hallucinations. The patient is not nervous/anxious.        Objective:      Physical Exam  Vitals signs and nursing note reviewed.   Constitutional:       General: She is not in acute distress.     Appearance: Normal appearance. She is well-developed. She is not ill-appearing or diaphoretic.   HENT:      Head: Normocephalic and atraumatic.      Right Ear: Hearing, tympanic membrane, ear canal and external ear normal.      Left Ear: Hearing, tympanic membrane, ear canal and external ear " normal.      Nose: Nose normal. No mucosal edema or rhinorrhea.      Right Sinus: No maxillary sinus tenderness or frontal sinus tenderness.      Left Sinus: No maxillary sinus tenderness or frontal sinus tenderness.      Mouth/Throat:      Pharynx: Uvula midline. No oropharyngeal exudate or posterior oropharyngeal erythema.   Eyes:      General:         Right eye: No discharge.         Left eye: No discharge.      Conjunctiva/sclera: Conjunctivae normal.      Pupils: Pupils are equal, round, and reactive to light.   Neck:      Musculoskeletal: Normal range of motion.      Thyroid: No thyroid mass or thyromegaly.      Vascular: No JVD.      Trachea: Trachea normal. No tracheal deviation.   Cardiovascular:      Rate and Rhythm: Normal rate and regular rhythm.      Heart sounds: Normal heart sounds. No murmur.   Pulmonary:      Effort: Pulmonary effort is normal. No respiratory distress.      Breath sounds: Normal breath sounds. No stridor. No decreased breath sounds, wheezing, rhonchi or rales.   Chest:      Chest wall: No tenderness.   Abdominal:      General: Bowel sounds are normal. There is no distension.      Palpations: Abdomen is soft. Abdomen is not rigid. There is no fluid wave.      Tenderness: There is no abdominal tenderness. There is no guarding or rebound.   Musculoskeletal: Normal range of motion.   Lymphadenopathy:      Head:      Right side of head: No tonsillar adenopathy.      Left side of head: No tonsillar adenopathy.      Cervical: No cervical adenopathy.   Skin:     General: Skin is warm and dry.      Findings: No rash.   Neurological:      Mental Status: She is alert and oriented to person, place, and time.   Psychiatric:         Speech: Speech normal.         Behavior: Behavior normal. Behavior is cooperative.         Thought Content: Thought content normal.         Judgment: Judgment normal.         Assessment:       1. Dyslipidemia    2. Hypothyroidism due to acquired atrophy of thyroid     3. Vitamin D deficiency    4. Osteoporosis, unspecified osteoporosis type, unspecified pathological fracture presence    5. Benign hypertensive heart disease without congestive heart failure    6. Abnormal finding of blood chemistry, unspecified     7. Hiatal hernia        Plan:       Iliana was seen today for annual exam.    Diagnoses and all orders for this visit:    Dyslipidemia- stable, on statin, check labs  -     CBC auto differential; Future  -     Comprehensive metabolic panel; Future  -     Lipid Panel; Future  -     Hemoglobin A1C; Future    Hypothyroidism due to acquired atrophy of thyroid- stable, on thyroid replacement, check labs  -     CBC auto differential; Future  -     Comprehensive metabolic panel; Future  -     TSH; Future  -     Hemoglobin A1C; Future    Vitamin D deficiency- on supplementation, check labs  -     CBC auto differential; Future  -     Comprehensive metabolic panel; Future  -     Vitamin D; Future    Osteoporosis, unspecified osteoporosis type, unspecified pathological fracture presence- did not tolerate meds, on calcium and vitamin d  -     CBC auto differential; Future  -     Comprehensive metabolic panel; Future    Benign hypertensive heart disease without congestive heart failure- stable, no swelling, or shortness of breath     Abnormal finding of blood chemistry, unspecified   -     Hemoglobin A1C; Future    Hiatal hernia- stable. On ppi    Patient states she has a chronic cough, states Dr. Borja told her it could be the generic synthroid, wants to see what labs look like??    Discussed age appropriate anticipatory guidance, healthy diet and lifestyle, regular exercise, weight management.  Fasting labs ordered  Flu shot when avail  Follow up with Primary Care Physician annually and prn

## 2020-09-17 ENCOUNTER — LAB VISIT (OUTPATIENT)
Dept: LAB | Facility: HOSPITAL | Age: 75
End: 2020-09-17
Attending: FAMILY MEDICINE
Payer: MEDICARE

## 2020-09-17 DIAGNOSIS — R79.9 ABNORMAL FINDING OF BLOOD CHEMISTRY, UNSPECIFIED: ICD-10-CM

## 2020-09-17 DIAGNOSIS — E03.4 HYPOTHYROIDISM DUE TO ACQUIRED ATROPHY OF THYROID: ICD-10-CM

## 2020-09-17 DIAGNOSIS — M81.0 OSTEOPOROSIS, UNSPECIFIED OSTEOPOROSIS TYPE, UNSPECIFIED PATHOLOGICAL FRACTURE PRESENCE: ICD-10-CM

## 2020-09-17 DIAGNOSIS — E78.5 DYSLIPIDEMIA: ICD-10-CM

## 2020-09-17 DIAGNOSIS — E55.9 VITAMIN D DEFICIENCY: ICD-10-CM

## 2020-09-17 LAB
ALBUMIN SERPL BCP-MCNC: 3.8 G/DL (ref 3.5–5.2)
ALP SERPL-CCNC: 93 U/L (ref 55–135)
ALT SERPL W/O P-5'-P-CCNC: 15 U/L (ref 10–44)
ANION GAP SERPL CALC-SCNC: 8 MMOL/L (ref 8–16)
AST SERPL-CCNC: 19 U/L (ref 10–40)
BASOPHILS # BLD AUTO: 0.06 K/UL (ref 0–0.2)
BASOPHILS NFR BLD: 0.8 % (ref 0–1.9)
BILIRUB SERPL-MCNC: 0.5 MG/DL (ref 0.1–1)
BUN SERPL-MCNC: 13 MG/DL (ref 8–23)
CALCIUM SERPL-MCNC: 9.6 MG/DL (ref 8.7–10.5)
CHLORIDE SERPL-SCNC: 104 MMOL/L (ref 95–110)
CHOLEST SERPL-MCNC: 195 MG/DL (ref 120–199)
CHOLEST/HDLC SERPL: 2.7 {RATIO} (ref 2–5)
CO2 SERPL-SCNC: 31 MMOL/L (ref 23–29)
CREAT SERPL-MCNC: 0.9 MG/DL (ref 0.5–1.4)
DIFFERENTIAL METHOD: ABNORMAL
EOSINOPHIL # BLD AUTO: 0.2 K/UL (ref 0–0.5)
EOSINOPHIL NFR BLD: 2.1 % (ref 0–8)
ERYTHROCYTE [DISTWIDTH] IN BLOOD BY AUTOMATED COUNT: 12.9 % (ref 11.5–14.5)
EST. GFR  (AFRICAN AMERICAN): >60 ML/MIN/1.73 M^2
EST. GFR  (NON AFRICAN AMERICAN): >60 ML/MIN/1.73 M^2
GLUCOSE SERPL-MCNC: 84 MG/DL (ref 70–110)
HCT VFR BLD AUTO: 44.5 % (ref 37–48.5)
HDLC SERPL-MCNC: 71 MG/DL (ref 40–75)
HDLC SERPL: 36.4 % (ref 20–50)
HGB BLD-MCNC: 13.9 G/DL (ref 12–16)
IMM GRANULOCYTES # BLD AUTO: 0.01 K/UL (ref 0–0.04)
IMM GRANULOCYTES NFR BLD AUTO: 0.1 % (ref 0–0.5)
LDLC SERPL CALC-MCNC: 107.6 MG/DL (ref 63–159)
LYMPHOCYTES # BLD AUTO: 2.1 K/UL (ref 1–4.8)
LYMPHOCYTES NFR BLD: 28.5 % (ref 18–48)
MCH RBC QN AUTO: 30.6 PG (ref 27–31)
MCHC RBC AUTO-ENTMCNC: 31.2 G/DL (ref 32–36)
MCV RBC AUTO: 98 FL (ref 82–98)
MONOCYTES # BLD AUTO: 0.6 K/UL (ref 0.3–1)
MONOCYTES NFR BLD: 8.7 % (ref 4–15)
NEUTROPHILS # BLD AUTO: 4.4 K/UL (ref 1.8–7.7)
NEUTROPHILS NFR BLD: 59.8 % (ref 38–73)
NONHDLC SERPL-MCNC: 124 MG/DL
NRBC BLD-RTO: 0 /100 WBC
PLATELET # BLD AUTO: 262 K/UL (ref 150–350)
PMV BLD AUTO: 10.5 FL (ref 9.2–12.9)
POTASSIUM SERPL-SCNC: 4.4 MMOL/L (ref 3.5–5.1)
PROT SERPL-MCNC: 7 G/DL (ref 6–8.4)
RBC # BLD AUTO: 4.54 M/UL (ref 4–5.4)
SODIUM SERPL-SCNC: 143 MMOL/L (ref 136–145)
T4 FREE SERPL-MCNC: 1.09 NG/DL (ref 0.71–1.51)
TRIGL SERPL-MCNC: 82 MG/DL (ref 30–150)
TSH SERPL DL<=0.005 MIU/L-ACNC: 5.84 UIU/ML (ref 0.4–4)
WBC # BLD AUTO: 7.27 K/UL (ref 3.9–12.7)

## 2020-09-17 PROCEDURE — 85025 COMPLETE CBC W/AUTO DIFF WBC: CPT | Mod: HCNC

## 2020-09-17 PROCEDURE — 84439 ASSAY OF FREE THYROXINE: CPT | Mod: HCNC

## 2020-09-17 PROCEDURE — 82306 VITAMIN D 25 HYDROXY: CPT | Mod: HCNC

## 2020-09-17 PROCEDURE — 80053 COMPREHEN METABOLIC PANEL: CPT | Mod: HCNC

## 2020-09-17 PROCEDURE — 84443 ASSAY THYROID STIM HORMONE: CPT | Mod: HCNC

## 2020-09-17 PROCEDURE — 36415 COLL VENOUS BLD VENIPUNCTURE: CPT | Mod: HCNC,PO

## 2020-09-17 PROCEDURE — 80061 LIPID PANEL: CPT | Mod: HCNC

## 2020-09-17 PROCEDURE — 83036 HEMOGLOBIN GLYCOSYLATED A1C: CPT | Mod: HCNC

## 2020-09-18 ENCOUNTER — PATIENT MESSAGE (OUTPATIENT)
Dept: INTERNAL MEDICINE | Facility: CLINIC | Age: 75
End: 2020-09-18

## 2020-09-18 DIAGNOSIS — E03.4 HYPOTHYROIDISM DUE TO ACQUIRED ATROPHY OF THYROID: Primary | ICD-10-CM

## 2020-09-18 LAB
25(OH)D3+25(OH)D2 SERPL-MCNC: 38 NG/ML (ref 30–96)
ESTIMATED AVG GLUCOSE: 111 MG/DL (ref 68–131)
HBA1C MFR BLD HPLC: 5.5 % (ref 4–5.6)

## 2020-09-21 ENCOUNTER — PATIENT MESSAGE (OUTPATIENT)
Dept: INTERNAL MEDICINE | Facility: CLINIC | Age: 75
End: 2020-09-21

## 2020-09-29 ENCOUNTER — PATIENT MESSAGE (OUTPATIENT)
Dept: OTHER | Facility: OTHER | Age: 75
End: 2020-09-29

## 2020-10-01 ENCOUNTER — TELEPHONE (OUTPATIENT)
Dept: INTERNAL MEDICINE | Facility: CLINIC | Age: 75
End: 2020-10-01

## 2020-10-01 NOTE — TELEPHONE ENCOUNTER
----- Message from Abisai Meneses sent at 10/1/2020  2:27 PM CDT -----  Regarding: Lab orders  Please put in lab orders for Iliana to have her labs redrawn [phone].

## 2020-10-02 ENCOUNTER — IMMUNIZATION (OUTPATIENT)
Dept: INTERNAL MEDICINE | Facility: CLINIC | Age: 75
End: 2020-10-02
Payer: MEDICARE

## 2020-10-02 PROCEDURE — 90694 VACC AIIV4 NO PRSRV 0.5ML IM: CPT | Mod: HCNC,S$GLB,, | Performed by: FAMILY MEDICINE

## 2020-10-02 PROCEDURE — 90694 FLU VACCINE - QUADRIVALENT - ADJUVANTED: ICD-10-PCS | Mod: HCNC,S$GLB,, | Performed by: FAMILY MEDICINE

## 2020-10-02 PROCEDURE — 99999 PR PBB SHADOW E&M-EST. PATIENT-LVL I: CPT | Mod: PBBFAC,HCNC,,

## 2020-10-02 PROCEDURE — G0008 ADMIN INFLUENZA VIRUS VAC: HCPCS | Mod: HCNC,S$GLB,, | Performed by: FAMILY MEDICINE

## 2020-10-02 PROCEDURE — 99999 PR PBB SHADOW E&M-EST. PATIENT-LVL I: ICD-10-PCS | Mod: PBBFAC,HCNC,,

## 2020-10-02 PROCEDURE — G0008 FLU VACCINE - QUADRIVALENT - ADJUVANTED: ICD-10-PCS | Mod: HCNC,S$GLB,, | Performed by: FAMILY MEDICINE

## 2020-10-02 NOTE — TELEPHONE ENCOUNTER
I am not sure what needs to be re ordered. Everything looks like it has been resulted. The TSH should be repeated 8 weeks from 9/17 when the first one was done

## 2020-11-13 ENCOUNTER — LAB VISIT (OUTPATIENT)
Dept: LAB | Facility: HOSPITAL | Age: 75
End: 2020-11-13
Attending: NURSE PRACTITIONER
Payer: MEDICARE

## 2020-11-13 DIAGNOSIS — E03.4 HYPOTHYROIDISM DUE TO ACQUIRED ATROPHY OF THYROID: ICD-10-CM

## 2020-11-13 LAB — TSH SERPL DL<=0.005 MIU/L-ACNC: 1.8 UIU/ML (ref 0.4–4)

## 2020-11-13 PROCEDURE — 84443 ASSAY THYROID STIM HORMONE: CPT | Mod: HCNC

## 2020-11-13 PROCEDURE — 36415 COLL VENOUS BLD VENIPUNCTURE: CPT | Mod: HCNC,PO

## 2020-11-19 ENCOUNTER — TELEPHONE (OUTPATIENT)
Dept: PRIMARY CARE CLINIC | Facility: CLINIC | Age: 75
End: 2020-11-19

## 2020-11-19 ENCOUNTER — OFFICE VISIT (OUTPATIENT)
Dept: PRIMARY CARE CLINIC | Facility: CLINIC | Age: 75
End: 2020-11-19
Payer: MEDICARE

## 2020-11-19 VITALS
DIASTOLIC BLOOD PRESSURE: 80 MMHG | SYSTOLIC BLOOD PRESSURE: 150 MMHG | TEMPERATURE: 98 F | HEART RATE: 97 BPM | WEIGHT: 147.69 LBS | BODY MASS INDEX: 24.58 KG/M2 | OXYGEN SATURATION: 96 %

## 2020-11-19 DIAGNOSIS — M81.0 OSTEOPOROSIS, UNSPECIFIED OSTEOPOROSIS TYPE, UNSPECIFIED PATHOLOGICAL FRACTURE PRESENCE: ICD-10-CM

## 2020-11-19 DIAGNOSIS — E78.5 DYSLIPIDEMIA: ICD-10-CM

## 2020-11-19 DIAGNOSIS — Z00.00 ROUTINE GENERAL MEDICAL EXAMINATION AT A HEALTH CARE FACILITY: Primary | ICD-10-CM

## 2020-11-19 DIAGNOSIS — E03.4 HYPOTHYROIDISM DUE TO ACQUIRED ATROPHY OF THYROID: ICD-10-CM

## 2020-11-19 DIAGNOSIS — I11.9 BENIGN HYPERTENSIVE HEART DISEASE WITHOUT CONGESTIVE HEART FAILURE: ICD-10-CM

## 2020-11-19 DIAGNOSIS — K44.9 HIATAL HERNIA: ICD-10-CM

## 2020-11-19 DIAGNOSIS — M70.61 GREATER TROCHANTERIC BURSITIS OF RIGHT HIP: ICD-10-CM

## 2020-11-19 PROCEDURE — 3077F SYST BP >= 140 MM HG: CPT | Mod: HCNC,CPTII,S$GLB, | Performed by: FAMILY MEDICINE

## 2020-11-19 PROCEDURE — 99499 RISK ADDL DX/OHS AUDIT: ICD-10-PCS | Mod: S$GLB,,, | Performed by: FAMILY MEDICINE

## 2020-11-19 PROCEDURE — 1126F PR PAIN SEVERITY QUANTIFIED, NO PAIN PRESENT: ICD-10-PCS | Mod: HCNC,S$GLB,, | Performed by: FAMILY MEDICINE

## 2020-11-19 PROCEDURE — 3288F FALL RISK ASSESSMENT DOCD: CPT | Mod: HCNC,CPTII,S$GLB, | Performed by: FAMILY MEDICINE

## 2020-11-19 PROCEDURE — 1100F PTFALLS ASSESS-DOCD GE2>/YR: CPT | Mod: HCNC,CPTII,S$GLB, | Performed by: FAMILY MEDICINE

## 2020-11-19 PROCEDURE — 99213 PR OFFICE/OUTPT VISIT, EST, LEVL III, 20-29 MIN: ICD-10-PCS | Mod: HCNC,S$GLB,, | Performed by: FAMILY MEDICINE

## 2020-11-19 PROCEDURE — 3079F PR MOST RECENT DIASTOLIC BLOOD PRESSURE 80-89 MM HG: ICD-10-PCS | Mod: HCNC,CPTII,S$GLB, | Performed by: FAMILY MEDICINE

## 2020-11-19 PROCEDURE — 3079F DIAST BP 80-89 MM HG: CPT | Mod: HCNC,CPTII,S$GLB, | Performed by: FAMILY MEDICINE

## 2020-11-19 PROCEDURE — 99213 OFFICE O/P EST LOW 20 MIN: CPT | Mod: HCNC,S$GLB,, | Performed by: FAMILY MEDICINE

## 2020-11-19 PROCEDURE — 3288F PR FALLS RISK ASSESSMENT DOCUMENTED: ICD-10-PCS | Mod: HCNC,CPTII,S$GLB, | Performed by: FAMILY MEDICINE

## 2020-11-19 PROCEDURE — 3077F PR MOST RECENT SYSTOLIC BLOOD PRESSURE >= 140 MM HG: ICD-10-PCS | Mod: HCNC,CPTII,S$GLB, | Performed by: FAMILY MEDICINE

## 2020-11-19 PROCEDURE — 1126F AMNT PAIN NOTED NONE PRSNT: CPT | Mod: HCNC,S$GLB,, | Performed by: FAMILY MEDICINE

## 2020-11-19 PROCEDURE — 99499 UNLISTED E&M SERVICE: CPT | Mod: S$GLB,,, | Performed by: FAMILY MEDICINE

## 2020-11-19 PROCEDURE — 1100F PR PT FALLS ASSESS DOC 2+ FALLS/FALL W/INJURY/YR: ICD-10-PCS | Mod: HCNC,CPTII,S$GLB, | Performed by: FAMILY MEDICINE

## 2020-11-19 PROCEDURE — 3008F PR BODY MASS INDEX (BMI) DOCUMENTED: ICD-10-PCS | Mod: HCNC,CPTII,S$GLB, | Performed by: FAMILY MEDICINE

## 2020-11-19 PROCEDURE — 3008F BODY MASS INDEX DOCD: CPT | Mod: HCNC,CPTII,S$GLB, | Performed by: FAMILY MEDICINE

## 2020-11-19 PROCEDURE — 99999 PR PBB SHADOW E&M-EST. PATIENT-LVL IV: ICD-10-PCS | Mod: PBBFAC,HCNC,, | Performed by: FAMILY MEDICINE

## 2020-11-19 PROCEDURE — 99999 PR PBB SHADOW E&M-EST. PATIENT-LVL IV: CPT | Mod: PBBFAC,HCNC,, | Performed by: FAMILY MEDICINE

## 2020-11-19 NOTE — PATIENT INSTRUCTIONS
Bursitis  You have bursitis. This is an inflammation of the bursa. These are small, fluid-filled sacs that surround the larger joints of the body. The bursa help the muscles and tendons move smoothly over the joints.  Bursitis often happens in the shoulder. But it can also affect the elbows, hips, pelvis, knees, toes, and heels. Bursitis can be caused by injury, overuse of the joint, or infection of the bursa. Symptoms include pain and tenderness over a joint. Symptoms get worse with movement.  Bursitis is treated with an anti-inflammatory medicine and by resting the joint. More severe cases require injection of medicine directly into the bursa.    Home care  · Rest the painful joint and protect it from movement. This will allow the inflammation to heal faster.  · Apply an ice pack over the injured area for no more than 15 to 20 minutes. Do this every 3 to 6 hours for the first 24 to 48 hours. Keep using ice packs 3 to 4 times a day until the pain and swelling improves.   · To make an ice pack, put ice cubes in a sealed plastic zip-lock bag. Wrap the bag in a clean, thin towel or cloth. Never put ice or an ice pack directly on the skin. As the ice melts, be careful to avoid getting any wrap or splint wet.  · You may take over-the-counter pain medicine to treat pain and inflammation, unless another medicine was prescribed. Anti-inflammatory pain medicines may be more effective. Talk with your provider beforeusing these medicines if you have chronic liver or kidney disease, or ever had a stomach ulcer or GI (gastrointestinal) bleeding.  · As your symptoms improve, slowly begin to move the joint. Do not overuse the joint. This may cause the symptoms to flare up again.  When to seek medical advice  Call your healthcare provider right away if any of these occur:  · Redness over the painful area  · Increasing pain or swelling at the joint  · Fever of 100.4°F (38°C) or above lasting for 24 to 48 hours  Date Last  Reviewed: 11/21/2015  © 8687-8625 [employer]. [address] [place] [address]. All rights reserved. This information is not intended as a substitute for professional medical care. Always follow your healthcare professional's instructions.        What is bursitis?  A bursa is a fluid-filled sac that helps cushion the muscles, tendons, and bones around a joint. When a bursa becomes inflamed, its called bursitis. Common symptoms of bursitis include pain, tenderness, and swelling that limits movement of the joint.  What causes bursitis?  Bursitis is most often caused by overuse of a joint. The repeated movements irritate the bursa and may cause it to swell. When that happens, other surrounding tissues may become inflamed or have less space to move. Bursitis is most common in large joints such as the knee, shoulder, and hip.       Nonsurgical treatment involves both rest and exercise.    How is bursitis treated?  To help reduce pain and swelling, your healthcare provider may recommend one or more of the following:   · Rest gives the bursa time to heal. This means limiting activities that put stress on the joint.  · Anti-inflammatory medications help reduce painful swelling. In some cases, this can include injections of cortisone or other steroid medicines into the bursa.  · Splints and support bandages improve your comfort and allow the bursa to heal.  · Physical therapy may be used to increase flexibility and strengthen muscles that support the joint.  · Aspiration removes extra fluid from the bursa using a needle. This can help your healthcare provider find out what is causing your bursitis. For example, it might be an infection or overuse.   · Surgery can be used to remove an inflamed or infected bursa. This is rarely needed.  Date Last Reviewed: 9/11/2015  © 8647-2278 [employer]. [address] [place] [id number]. All rights reserved. This information is not intended  as a substitute for professional medical care. Always follow your healthcare professional's instructions.        Understanding Trochanteric Bursitis    A bursa is a thin, slippery, sac-like film. It contains a small amount of fluid. This structure is found between bones and soft tissues in and around joints. A bursa cushions and protects a joint. It keeps parts of a joint from rubbing against each other. If a bursa becomes inflamed and irritated, it is known as bursitis.  The trochanteric bursa is found on the hip joint. It lies on top of the bump at the top of the thighbone called the greater trochanter. Inflammation of this bursa is called trochanteric bursitis.     How to say it  [id number]   Causes of trochanteric bursitis  Causes may include:  · Overuse of the hip during running or other sports, dance, or work  · Falling on or irritation to the side of the hip  This condition may occur along with other problems, such as osteoarthritis of the hip or knee, or low back problems. In rare cases, it may occur after hip surgery.  Symptoms of trochanteric bursitis  · Pain or aching on the side of the hip. The pain may travel down the leg.  · Swelling, tenderness, or warmth on the side of the hip at the bony bump at the top of the thigh  Treatment for trochanteric bursitis  These may include:  · Resting the hip. This allows the bursa to heal.  · Prescription or over-the-counter pain medicines. These help reduce inflammation, swelling, and pain.  · Cold packs and heat packs. These help reduce pain and swelling.  · Stretching and strengthening exercises. These improve flexibility and strength around the hip.  · Physical therapy. This includes exercises or other treatments.  · Injections of medicine into the bursa. This may help reduce inflammation and relieve symptoms.  Possible complications  If you dont give your hip time to heal, the problem may not go away, may return, or may get worse. Rest and treat your hip  as directed.     When to call your healthcare provider  Call your healthcare provider right away if you have any of these:  · Fever of 100.4°F (38°C) or higher, or as directed  · Redness, swelling, or warmth that gets worse  · Symptoms that dont get better with prescribed medicines, or get worse  · New symptoms   Date Last Reviewed: 3/29/2016  © 3469-7227 [employer]. [address] [place] [address], [place] [address]. All rights reserved. This information is not intended as a substitute for professional medical care. Always follow your healthcare professional's instructions.

## 2020-11-19 NOTE — TELEPHONE ENCOUNTER
----- Message from Mariaelena Cool sent at 11/19/2020  1:50 PM CST -----  Pt on the way, within 10min, pt went to wrong location,  did not tell her, any questions call pt at

## 2020-11-19 NOTE — PROGRESS NOTES
Subjective:      Patient ID: Iliana Mariano is a 75 y.o. female.    Chief Complaint: Follow-up and Annual Exam    Disclaimer:  This note is prepared using voice recognition software and as such is likely to have errors and has not been proof read. Please contact me for questions.     Patient is here today for followup of multiple issues and annual exam.    Did break 2 ribs after falling out of bed reaching for her alarm.  Doing ok. Not on meds. With moderate right hip pain, but only to touch on outer tronchanter. No ROM issues.      htn- controlled. Taking Triamterene / hctz   No problems with medication..     She does have a history of shingles.  Left arm is good.   Patient also has a history of hypothyroidism.  Currently now on 100 mcg doing the generic.  Labs reviewed.  She has a history of vitamin D deficiency.  She's currently on over-the-counter supplementation.  Her insurance won't allow us to check it more than once per year.  1000IU daily.   Hyperlipidemia-currently controlled with statin.  Pravastatin 40mg.   Reports that she does have concerns about her  and possibly memory issues vs vascular dementia or depression. Discussed stress and signs and symptoms.   She is however concerned about her .  He is being much more forgetful lately      She does report lately though that she has been having some discomfort in her hips.  Mainly on the lateral aspects.  Worse when she gets up.  Feels that it may be more bursitis related.    Lab Results       Component                Value               Date                       HGBA1C                   5.5                 09/17/2020                 HGBA1C                   5.5                 07/18/2017             Lab Results       Component                Value               Date                       CHOL                     195                 09/17/2020                 CHOL                     183                 06/24/2019                 CHOL                      193                 07/25/2018            Lab Results       Component                Value               Date                       LDLCALC                  107.6               09/17/2020                 LDLCALC                  94.6                06/24/2019                 LDLCALC                  101.6               07/25/2018              Wt Readings from Last 10 Encounters:  11/19/20 : 67 kg (147 lb 11.3 oz)  09/14/20 : 66.6 kg (146 lb 13.2 oz)  08/06/20 : 66.4 kg (146 lb 6.2 oz)  07/01/19 : 63.6 kg (140 lb 3.4 oz)  12/07/18 : 65 kg (143 lb 4.8 oz)  10/30/18 : 65.6 kg (144 lb 10 oz)  08/01/18 : 65.2 kg (143 lb 11.8 oz)  02/02/18 : 65.8 kg (145 lb)  01/31/18 : 65.8 kg (145 lb 1 oz)  08/03/17 : 65.8 kg (145 lb)      The 10-year ASCVD risk score (Dominick HANSON Jr., et al., 2013) is: 19%    Values used to calculate the score:      Age: 75 years      Sex: Female      Is Non- : No      Diabetic: No      Tobacco smoker: No      Systolic Blood Pressure: 150 mmHg      Is BP treated: No      HDL Cholesterol: 71 mg/dL      Total Cholesterol: 195 mg/dL                  Lab Results   Component Value Date    WBC 7.27 09/17/2020    HGB 13.9 09/17/2020    HCT 44.5 09/17/2020     09/17/2020    CHOL 195 09/17/2020    TRIG 82 09/17/2020    HDL 71 09/17/2020    ALT 15 09/17/2020    AST 19 09/17/2020     09/17/2020    K 4.4 09/17/2020     09/17/2020    CREATININE 0.9 09/17/2020    BUN 13 09/17/2020    CO2 31 (H) 09/17/2020    TSH 1.800 11/13/2020    HGBA1C 5.5 09/17/2020       X-Ray Ribs 2 View Left  Narrative: EXAMINATION:  XR RIBS 2 VIEW LEFT    CLINICAL HISTORY:  Acute pain due to trauma    TECHNIQUE:  Two views of the left ribs were performed.    COMPARISON:  07/11/2011 radiograph    FINDINGS:  Lung parenchyma clear.  No pneumothorax.  Bones are osteopenic.  Nondisplaced left 7th and 8th rib fractures suspected.  Impression: As above    Electronically signed by: Jae  MD Guerrero  Date:    08/06/2020  Time:    14:21        Review of Systems   Constitutional: Negative for chills, fatigue and fever.   HENT: Negative for ear pain and trouble swallowing.    Eyes: Negative for pain and visual disturbance.   Respiratory: Negative for cough and shortness of breath.    Cardiovascular: Negative for chest pain and leg swelling.   Gastrointestinal: Negative for abdominal pain, blood in stool, nausea and vomiting.   Endocrine: Negative for cold intolerance and heat intolerance.   Genitourinary: Negative for dysuria and frequency.   Musculoskeletal: Positive for arthralgias. Negative for joint swelling, myalgias and neck pain.   Skin: Negative for color change and rash.   Neurological: Negative for dizziness and headaches.   Psychiatric/Behavioral: Negative for behavioral problems, decreased concentration, dysphoric mood and sleep disturbance. The patient is not nervous/anxious.      Objective:     Vitals:    11/19/20 1404   BP: (!) 150/80   Pulse: 97   Temp: 98.1 °F (36.7 °C)   SpO2: 96%   Weight: 67 kg (147 lb 11.3 oz)     Physical Exam  Vitals signs reviewed.   Constitutional:       Appearance: Normal appearance. She is well-developed and normal weight.   HENT:      Head: Normocephalic and atraumatic.      Right Ear: Tympanic membrane and external ear normal.      Left Ear: Tympanic membrane and external ear normal.      Nose: Nose normal.      Mouth/Throat:      Mouth: Mucous membranes are moist.      Pharynx: Oropharynx is clear.   Eyes:      Conjunctiva/sclera: Conjunctivae normal.      Pupils: Pupils are equal, round, and reactive to light.   Neck:      Musculoskeletal: Normal range of motion and neck supple.      Thyroid: No thyromegaly.   Cardiovascular:      Rate and Rhythm: Normal rate and regular rhythm.      Heart sounds: No murmur. No friction rub. No gallop.    Pulmonary:      Effort: Pulmonary effort is normal. No respiratory distress.      Breath sounds: No wheezing or rales.    Abdominal:      General: Bowel sounds are normal. There is no distension.      Palpations: Abdomen is soft.      Tenderness: There is no abdominal tenderness. There is no rebound.   Musculoskeletal: Normal range of motion.      Left hip: She exhibits tenderness and bony tenderness. She exhibits normal range of motion and normal strength.        Legs:    Lymphadenopathy:      Cervical: No cervical adenopathy.   Skin:     General: Skin is warm and dry.      Findings: No rash.   Neurological:      Mental Status: She is alert and oriented to person, place, and time.   Psychiatric:         Attention and Perception: Attention and perception normal.         Mood and Affect: Mood and affect normal.         Speech: Speech normal.         Behavior: Behavior normal.         Thought Content: Thought content normal.         Cognition and Memory: Cognition and memory normal.         Judgment: Judgment normal.       Assessment:     1. Routine general medical examination at a health care facility    2. Hypothyroidism due to acquired atrophy of thyroid    3. Benign hypertensive heart disease without congestive heart failure    4. Dyslipidemia    5. Osteoporosis, unspecified osteoporosis type, unspecified pathological fracture presence    6. Greater trochanteric bursitis of right hip    7. Hiatal hernia      Plan:   Iliana was seen today for follow-up and annual exam.    Diagnoses and all orders for this visit:    Routine general medical examination at a health care facility - labs reviewed. Discussed Health Maintenance issues.       Hypothyroidism due to acquired atrophy of thyroid - stable, Continue with current medications and interventions. Labs reviewed.       Benign hypertensive heart disease without congestive heart failure - stable, Continue with current medications and interventions. Labs reviewed.     Dyslipidemia - stable, Continue with current medications and interventions. Labs reviewed.   Osteoporosis, unspecified  osteoporosis type, unspecified pathological fracture presence - stable, Continue with current medications and interventions. Labs reviewed.     Greater trochanteric bursitis of right hip- new. Declines injection. Given instructions, topical therapy. Exercises. Can followup if not imprivnig for injection if desires.     Hiatal hernia  - stable, Continue with current medications and interventions. Labs reviewed.             Follow up in about 1 year (around 11/19/2021) for physical with Dr DWYER.    Patient Instructions       Bursitis  You have bursitis. This is an inflammation of the bursa. These are small, fluid-filled sacs that surround the larger joints of the body. The bursa help the muscles and tendons move smoothly over the joints.  Bursitis often happens in the shoulder. But it can also affect the elbows, hips, pelvis, knees, toes, and heels. Bursitis can be caused by injury, overuse of the joint, or infection of the bursa. Symptoms include pain and tenderness over a joint. Symptoms get worse with movement.  Bursitis is treated with an anti-inflammatory medicine and by resting the joint. More severe cases require injection of medicine directly into the bursa.    Home care  · Rest the painful joint and protect it from movement. This will allow the inflammation to heal faster.  · Apply an ice pack over the injured area for no more than 15 to 20 minutes. Do this every 3 to 6 hours for the first 24 to 48 hours. Keep using ice packs 3 to 4 times a day until the pain and swelling improves.   · To make an ice pack, put ice cubes in a sealed plastic zip-lock bag. Wrap the bag in a clean, thin towel or cloth. Never put ice or an ice pack directly on the skin. As the ice melts, be careful to avoid getting any wrap or splint wet.  · You may take over-the-counter pain medicine to treat pain and inflammation, unless another medicine was prescribed. Anti-inflammatory pain medicines may be more effective. Talk with your  provider beforeusing these medicines if you have chronic liver or kidney disease, or ever had a stomach ulcer or GI (gastrointestinal) bleeding.  · As your symptoms improve, slowly begin to move the joint. Do not overuse the joint. This may cause the symptoms to flare up again.  When to seek medical advice  Call your healthcare provider right away if any of these occur:  · Redness over the painful area  · Increasing pain or swelling at the joint  · Fever of 100.4°F (38°C) or above lasting for 24 to 48 hours  Date Last Reviewed: 11/21/2015  © 0378-9277 [employer]. [address] [place] [address]. All rights reserved. This information is not intended as a substitute for professional medical care. Always follow your healthcare professional's instructions.        What is bursitis?  A bursa is a fluid-filled sac that helps cushion the muscles, tendons, and bones around a joint. When a bursa becomes inflamed, its called bursitis. Common symptoms of bursitis include pain, tenderness, and swelling that limits movement of the joint.  What causes bursitis?  Bursitis is most often caused by overuse of a joint. The repeated movements irritate the bursa and may cause it to swell. When that happens, other surrounding tissues may become inflamed or have less space to move. Bursitis is most common in large joints such as the knee, shoulder, and hip.       Nonsurgical treatment involves both rest and exercise.    How is bursitis treated?  To help reduce pain and swelling, your healthcare provider may recommend one or more of the following:   · Rest gives the bursa time to heal. This means limiting activities that put stress on the joint.  · Anti-inflammatory medications help reduce painful swelling. In some cases, this can include injections of cortisone or other steroid medicines into the bursa.  · Splints and support bandages improve your comfort and allow the bursa to heal.  · Physical therapy may be  used to increase flexibility and strengthen muscles that support the joint.  · Aspiration removes extra fluid from the bursa using a needle. This can help your healthcare provider find out what is causing your bursitis. For example, it might be an infection or overuse.   · Surgery can be used to remove an inflamed or infected bursa. This is rarely needed.  Date Last Reviewed: 9/11/2015  © 7451-9338 [employer]. [address] [place] [address], [place] [address]. All rights reserved. This information is not intended as a substitute for professional medical care. Always follow your healthcare professional's instructions.        Understanding Trochanteric Bursitis    A bursa is a thin, slippery, sac-like film. It contains a small amount of fluid. This structure is found between bones and soft tissues in and around joints. A bursa cushions and protects a joint. It keeps parts of a joint from rubbing against each other. If a bursa becomes inflamed and irritated, it is known as bursitis.  The trochanteric bursa is found on the hip joint. It lies on top of the bump at the top of the thighbone called the greater trochanter. Inflammation of this bursa is called trochanteric bursitis.     How to say it  [id number]   Causes of trochanteric bursitis  Causes may include:  · Overuse of the hip during running or other sports, dance, or work  · Falling on or irritation to the side of the hip  This condition may occur along with other problems, such as osteoarthritis of the hip or knee, or low back problems. In rare cases, it may occur after hip surgery.  Symptoms of trochanteric bursitis  · Pain or aching on the side of the hip. The pain may travel down the leg.  · Swelling, tenderness, or warmth on the side of the hip at the bony bump at the top of the thigh  Treatment for trochanteric bursitis  These may include:  · Resting the hip. This allows the bursa to heal.  · Prescription or over-the-counter pain medicines.  These help reduce inflammation, swelling, and pain.  · Cold packs and heat packs. These help reduce pain and swelling.  · Stretching and strengthening exercises. These improve flexibility and strength around the hip.  · Physical therapy. This includes exercises or other treatments.  · Injections of medicine into the bursa. This may help reduce inflammation and relieve symptoms.  Possible complications  If you dont give your hip time to heal, the problem may not go away, may return, or may get worse. Rest and treat your hip as directed.     When to call your healthcare provider  Call your healthcare provider right away if you have any of these:  · Fever of 100.4°F (38°C) or higher, or as directed  · Redness, swelling, or warmth that gets worse  · Symptoms that dont get better with prescribed medicines, or get worse  · New symptoms   Date Last Reviewed: 3/29/2016  © 5345-9213 [employer]. [address] [place] [address], [place] [address]. All rights reserved. This information is not intended as a substitute for professional medical care. Always follow your healthcare professional's instructions.

## 2020-11-24 ENCOUNTER — TELEPHONE (OUTPATIENT)
Dept: PRIMARY CARE CLINIC | Facility: CLINIC | Age: 75
End: 2020-11-24

## 2020-11-24 NOTE — TELEPHONE ENCOUNTER
Called patient back.  Per patient, had discussed a medication for bladder spasms w/ Dr. Borja but can't remember what medication.  Let pt know that I will call her back once I speak to Dr. Borja.    Breonna Masters PA-C   Physician Assistant   [facility]

## 2020-11-30 ENCOUNTER — PATIENT MESSAGE (OUTPATIENT)
Dept: INTERNAL MEDICINE | Facility: CLINIC | Age: 75
End: 2020-11-30

## 2021-02-11 ENCOUNTER — PATIENT MESSAGE (OUTPATIENT)
Dept: PRIMARY CARE CLINIC | Facility: CLINIC | Age: 76
End: 2021-02-11

## 2021-03-04 ENCOUNTER — PES CALL (OUTPATIENT)
Dept: ADMINISTRATIVE | Facility: CLINIC | Age: 76
End: 2021-03-04

## 2021-03-05 ENCOUNTER — PATIENT MESSAGE (OUTPATIENT)
Dept: PRIMARY CARE CLINIC | Facility: CLINIC | Age: 76
End: 2021-03-05

## 2021-03-05 ENCOUNTER — TELEPHONE (OUTPATIENT)
Dept: ADMINISTRATIVE | Facility: CLINIC | Age: 76
End: 2021-03-05

## 2021-03-10 ENCOUNTER — TELEPHONE (OUTPATIENT)
Dept: ADMINISTRATIVE | Facility: CLINIC | Age: 76
End: 2021-03-10

## 2021-03-10 ENCOUNTER — PES CALL (OUTPATIENT)
Dept: ADMINISTRATIVE | Facility: CLINIC | Age: 76
End: 2021-03-10

## 2021-03-25 ENCOUNTER — PATIENT OUTREACH (OUTPATIENT)
Dept: ADMINISTRATIVE | Facility: HOSPITAL | Age: 76
End: 2021-03-25

## 2021-04-19 ENCOUNTER — OFFICE VISIT (OUTPATIENT)
Dept: INTERNAL MEDICINE | Facility: CLINIC | Age: 76
End: 2021-04-19
Payer: MEDICARE

## 2021-04-19 VITALS
HEART RATE: 74 BPM | HEIGHT: 64 IN | TEMPERATURE: 98 F | RESPIRATION RATE: 18 BRPM | BODY MASS INDEX: 25.97 KG/M2 | SYSTOLIC BLOOD PRESSURE: 136 MMHG | WEIGHT: 152.13 LBS | DIASTOLIC BLOOD PRESSURE: 80 MMHG

## 2021-04-19 DIAGNOSIS — E03.4 HYPOTHYROIDISM DUE TO ACQUIRED ATROPHY OF THYROID: ICD-10-CM

## 2021-04-19 DIAGNOSIS — H40.9 MODERATE STAGE GLAUCOMA: ICD-10-CM

## 2021-04-19 DIAGNOSIS — E55.9 VITAMIN D DEFICIENCY: ICD-10-CM

## 2021-04-19 DIAGNOSIS — E78.5 DYSLIPIDEMIA: ICD-10-CM

## 2021-04-19 DIAGNOSIS — K44.9 HIATAL HERNIA: ICD-10-CM

## 2021-04-19 DIAGNOSIS — Z00.00 ENCOUNTER FOR PREVENTIVE HEALTH EXAMINATION: Primary | ICD-10-CM

## 2021-04-19 DIAGNOSIS — M81.0 OSTEOPOROSIS, UNSPECIFIED OSTEOPOROSIS TYPE, UNSPECIFIED PATHOLOGICAL FRACTURE PRESENCE: ICD-10-CM

## 2021-04-19 PROCEDURE — 99999 PR PBB SHADOW E&M-EST. PATIENT-LVL V: CPT | Mod: PBBFAC,,, | Performed by: NURSE PRACTITIONER

## 2021-04-19 PROCEDURE — 1101F PT FALLS ASSESS-DOCD LE1/YR: CPT | Mod: CPTII,S$GLB,, | Performed by: NURSE PRACTITIONER

## 2021-04-19 PROCEDURE — 1101F PR PT FALLS ASSESS DOC 0-1 FALLS W/OUT INJ PAST YR: ICD-10-PCS | Mod: CPTII,S$GLB,, | Performed by: NURSE PRACTITIONER

## 2021-04-19 PROCEDURE — G0439 PR MEDICARE ANNUAL WELLNESS SUBSEQUENT VISIT: ICD-10-PCS | Mod: S$GLB,,, | Performed by: NURSE PRACTITIONER

## 2021-04-19 PROCEDURE — 99499 UNLISTED E&M SERVICE: CPT | Mod: HCNC,S$GLB,, | Performed by: NURSE PRACTITIONER

## 2021-04-19 PROCEDURE — 99499 RISK ADDL DX/OHS AUDIT: ICD-10-PCS | Mod: HCNC,S$GLB,, | Performed by: NURSE PRACTITIONER

## 2021-04-19 PROCEDURE — 1126F AMNT PAIN NOTED NONE PRSNT: CPT | Mod: S$GLB,,, | Performed by: NURSE PRACTITIONER

## 2021-04-19 PROCEDURE — 3008F PR BODY MASS INDEX (BMI) DOCUMENTED: ICD-10-PCS | Mod: CPTII,S$GLB,, | Performed by: NURSE PRACTITIONER

## 2021-04-19 PROCEDURE — 99999 PR PBB SHADOW E&M-EST. PATIENT-LVL V: ICD-10-PCS | Mod: PBBFAC,,, | Performed by: NURSE PRACTITIONER

## 2021-04-19 PROCEDURE — G0439 PPPS, SUBSEQ VISIT: HCPCS | Mod: S$GLB,,, | Performed by: NURSE PRACTITIONER

## 2021-04-19 PROCEDURE — 3008F BODY MASS INDEX DOCD: CPT | Mod: CPTII,S$GLB,, | Performed by: NURSE PRACTITIONER

## 2021-04-19 PROCEDURE — 1126F PR PAIN SEVERITY QUANTIFIED, NO PAIN PRESENT: ICD-10-PCS | Mod: S$GLB,,, | Performed by: NURSE PRACTITIONER

## 2021-04-19 PROCEDURE — 3288F PR FALLS RISK ASSESSMENT DOCUMENTED: ICD-10-PCS | Mod: CPTII,S$GLB,, | Performed by: NURSE PRACTITIONER

## 2021-04-19 PROCEDURE — 3288F FALL RISK ASSESSMENT DOCD: CPT | Mod: CPTII,S$GLB,, | Performed by: NURSE PRACTITIONER

## 2021-04-28 ENCOUNTER — PATIENT MESSAGE (OUTPATIENT)
Dept: RESEARCH | Facility: HOSPITAL | Age: 76
End: 2021-04-28

## 2021-06-03 DIAGNOSIS — I11.9 HEART DISEASE, HYPERTENSIVE, BENIGN, WITHOUT HEART FAILURE: ICD-10-CM

## 2021-06-03 RX ORDER — TRIAMTERENE/HYDROCHLOROTHIAZID 37.5-25 MG
TABLET ORAL
Qty: 30 TABLET | Refills: 0 | Status: SHIPPED | OUTPATIENT
Start: 2021-06-03 | End: 2021-06-07 | Stop reason: SDUPTHER

## 2021-06-07 DIAGNOSIS — I11.9 HEART DISEASE, HYPERTENSIVE, BENIGN, WITHOUT HEART FAILURE: ICD-10-CM

## 2021-06-07 RX ORDER — TRIAMTERENE/HYDROCHLOROTHIAZID 37.5-25 MG
TABLET ORAL
Qty: 90 TABLET | Refills: 1 | Status: SHIPPED | OUTPATIENT
Start: 2021-06-07 | End: 2021-09-09

## 2021-08-09 ENCOUNTER — PATIENT MESSAGE (OUTPATIENT)
Dept: PRIMARY CARE CLINIC | Facility: CLINIC | Age: 76
End: 2021-08-09

## 2021-08-12 ENCOUNTER — TELEPHONE (OUTPATIENT)
Dept: PRIMARY CARE CLINIC | Facility: CLINIC | Age: 76
End: 2021-08-12

## 2021-08-12 ENCOUNTER — OFFICE VISIT (OUTPATIENT)
Dept: PRIMARY CARE CLINIC | Facility: CLINIC | Age: 76
End: 2021-08-12
Payer: MEDICARE

## 2021-08-12 VITALS — SYSTOLIC BLOOD PRESSURE: 132 MMHG | DIASTOLIC BLOOD PRESSURE: 78 MMHG

## 2021-08-12 DIAGNOSIS — M79.641 RIGHT HAND PAIN: ICD-10-CM

## 2021-08-12 DIAGNOSIS — R00.0 RAPID OR IRREGULAR HEARTBEAT: Primary | ICD-10-CM

## 2021-08-12 DIAGNOSIS — R55 SYNCOPE AND COLLAPSE: ICD-10-CM

## 2021-08-12 DIAGNOSIS — I11.9 BENIGN HYPERTENSIVE HEART DISEASE WITHOUT CONGESTIVE HEART FAILURE: ICD-10-CM

## 2021-08-12 DIAGNOSIS — M65.331 TRIGGER MIDDLE FINGER OF RIGHT HAND: ICD-10-CM

## 2021-08-12 DIAGNOSIS — E03.4 HYPOTHYROIDISM DUE TO ACQUIRED ATROPHY OF THYROID: ICD-10-CM

## 2021-08-12 PROCEDURE — 1159F MED LIST DOCD IN RCRD: CPT | Mod: CPTII,95,, | Performed by: FAMILY MEDICINE

## 2021-08-12 PROCEDURE — 3078F DIAST BP <80 MM HG: CPT | Mod: CPTII,95,, | Performed by: FAMILY MEDICINE

## 2021-08-12 PROCEDURE — 3078F PR MOST RECENT DIASTOLIC BLOOD PRESSURE < 80 MM HG: ICD-10-PCS | Mod: CPTII,95,, | Performed by: FAMILY MEDICINE

## 2021-08-12 PROCEDURE — 3288F PR FALLS RISK ASSESSMENT DOCUMENTED: ICD-10-PCS | Mod: CPTII,95,, | Performed by: FAMILY MEDICINE

## 2021-08-12 PROCEDURE — 3075F SYST BP GE 130 - 139MM HG: CPT | Mod: CPTII,95,, | Performed by: FAMILY MEDICINE

## 2021-08-12 PROCEDURE — 1159F PR MEDICATION LIST DOCUMENTED IN MEDICAL RECORD: ICD-10-PCS | Mod: CPTII,95,, | Performed by: FAMILY MEDICINE

## 2021-08-12 PROCEDURE — 99499 RISK ADDL DX/OHS AUDIT: ICD-10-PCS | Mod: HCNC,95,, | Performed by: FAMILY MEDICINE

## 2021-08-12 PROCEDURE — 99215 PR OFFICE/OUTPT VISIT, EST, LEVL V, 40-54 MIN: ICD-10-PCS | Mod: 95,,, | Performed by: FAMILY MEDICINE

## 2021-08-12 PROCEDURE — 3075F PR MOST RECENT SYSTOLIC BLOOD PRESS GE 130-139MM HG: ICD-10-PCS | Mod: CPTII,95,, | Performed by: FAMILY MEDICINE

## 2021-08-12 PROCEDURE — 99215 OFFICE O/P EST HI 40 MIN: CPT | Mod: 95,,, | Performed by: FAMILY MEDICINE

## 2021-08-12 PROCEDURE — 1160F PR REVIEW ALL MEDS BY PRESCRIBER/CLIN PHARMACIST DOCUMENTED: ICD-10-PCS | Mod: CPTII,95,, | Performed by: FAMILY MEDICINE

## 2021-08-12 PROCEDURE — 99499 UNLISTED E&M SERVICE: CPT | Mod: HCNC,95,, | Performed by: FAMILY MEDICINE

## 2021-08-12 PROCEDURE — 3288F FALL RISK ASSESSMENT DOCD: CPT | Mod: CPTII,95,, | Performed by: FAMILY MEDICINE

## 2021-08-12 PROCEDURE — 1101F PR PT FALLS ASSESS DOC 0-1 FALLS W/OUT INJ PAST YR: ICD-10-PCS | Mod: CPTII,95,, | Performed by: FAMILY MEDICINE

## 2021-08-12 PROCEDURE — 1101F PT FALLS ASSESS-DOCD LE1/YR: CPT | Mod: CPTII,95,, | Performed by: FAMILY MEDICINE

## 2021-08-12 PROCEDURE — 1160F RVW MEDS BY RX/DR IN RCRD: CPT | Mod: CPTII,95,, | Performed by: FAMILY MEDICINE

## 2021-08-13 ENCOUNTER — LAB VISIT (OUTPATIENT)
Dept: LAB | Facility: HOSPITAL | Age: 76
End: 2021-08-13
Attending: FAMILY MEDICINE
Payer: MEDICARE

## 2021-08-13 DIAGNOSIS — R00.0 RAPID OR IRREGULAR HEARTBEAT: ICD-10-CM

## 2021-08-13 DIAGNOSIS — I11.9 BENIGN HYPERTENSIVE HEART DISEASE WITHOUT CONGESTIVE HEART FAILURE: ICD-10-CM

## 2021-08-13 DIAGNOSIS — E03.4 HYPOTHYROIDISM DUE TO ACQUIRED ATROPHY OF THYROID: ICD-10-CM

## 2021-08-13 LAB
ALBUMIN SERPL BCP-MCNC: 3.7 G/DL (ref 3.5–5.2)
ALP SERPL-CCNC: 87 U/L (ref 55–135)
ALT SERPL W/O P-5'-P-CCNC: 17 U/L (ref 10–44)
ANION GAP SERPL CALC-SCNC: 11 MMOL/L (ref 8–16)
AST SERPL-CCNC: 19 U/L (ref 10–40)
BASOPHILS # BLD AUTO: 0.04 K/UL (ref 0–0.2)
BASOPHILS NFR BLD: 0.5 % (ref 0–1.9)
BILIRUB SERPL-MCNC: 0.6 MG/DL (ref 0.1–1)
BUN SERPL-MCNC: 13 MG/DL (ref 8–23)
CALCIUM SERPL-MCNC: 10.1 MG/DL (ref 8.7–10.5)
CHLORIDE SERPL-SCNC: 102 MMOL/L (ref 95–110)
CO2 SERPL-SCNC: 28 MMOL/L (ref 23–29)
CREAT SERPL-MCNC: 0.9 MG/DL (ref 0.5–1.4)
DIFFERENTIAL METHOD: ABNORMAL
EOSINOPHIL # BLD AUTO: 0.2 K/UL (ref 0–0.5)
EOSINOPHIL NFR BLD: 2.1 % (ref 0–8)
ERYTHROCYTE [DISTWIDTH] IN BLOOD BY AUTOMATED COUNT: 12.9 % (ref 11.5–14.5)
EST. GFR  (AFRICAN AMERICAN): >60 ML/MIN/1.73 M^2
EST. GFR  (NON AFRICAN AMERICAN): >60 ML/MIN/1.73 M^2
GLUCOSE SERPL-MCNC: 105 MG/DL (ref 70–110)
HCT VFR BLD AUTO: 44.6 % (ref 37–48.5)
HGB BLD-MCNC: 13.8 G/DL (ref 12–16)
IMM GRANULOCYTES # BLD AUTO: 0.02 K/UL (ref 0–0.04)
IMM GRANULOCYTES NFR BLD AUTO: 0.2 % (ref 0–0.5)
LYMPHOCYTES # BLD AUTO: 2.3 K/UL (ref 1–4.8)
LYMPHOCYTES NFR BLD: 27.4 % (ref 18–48)
MCH RBC QN AUTO: 30.3 PG (ref 27–31)
MCHC RBC AUTO-ENTMCNC: 30.9 G/DL (ref 32–36)
MCV RBC AUTO: 98 FL (ref 82–98)
MONOCYTES # BLD AUTO: 0.6 K/UL (ref 0.3–1)
MONOCYTES NFR BLD: 6.9 % (ref 4–15)
NEUTROPHILS # BLD AUTO: 5.3 K/UL (ref 1.8–7.7)
NEUTROPHILS NFR BLD: 62.9 % (ref 38–73)
NRBC BLD-RTO: 0 /100 WBC
PLATELET # BLD AUTO: 270 K/UL (ref 150–450)
PMV BLD AUTO: 10.3 FL (ref 9.2–12.9)
POTASSIUM SERPL-SCNC: 3.9 MMOL/L (ref 3.5–5.1)
PROT SERPL-MCNC: 7 G/DL (ref 6–8.4)
RBC # BLD AUTO: 4.56 M/UL (ref 4–5.4)
SODIUM SERPL-SCNC: 141 MMOL/L (ref 136–145)
T4 FREE SERPL-MCNC: 1.21 NG/DL (ref 0.71–1.51)
TSH SERPL DL<=0.005 MIU/L-ACNC: 1.39 UIU/ML (ref 0.4–4)
WBC # BLD AUTO: 8.49 K/UL (ref 3.9–12.7)

## 2021-08-13 PROCEDURE — 36415 COLL VENOUS BLD VENIPUNCTURE: CPT | Mod: PO | Performed by: FAMILY MEDICINE

## 2021-08-13 PROCEDURE — 84439 ASSAY OF FREE THYROXINE: CPT | Performed by: FAMILY MEDICINE

## 2021-08-13 PROCEDURE — 80053 COMPREHEN METABOLIC PANEL: CPT | Performed by: FAMILY MEDICINE

## 2021-08-13 PROCEDURE — 84443 ASSAY THYROID STIM HORMONE: CPT | Performed by: FAMILY MEDICINE

## 2021-08-13 PROCEDURE — 85025 COMPLETE CBC W/AUTO DIFF WBC: CPT | Performed by: FAMILY MEDICINE

## 2021-08-18 ENCOUNTER — TELEPHONE (OUTPATIENT)
Dept: RADIOLOGY | Facility: HOSPITAL | Age: 76
End: 2021-08-18

## 2021-08-18 DIAGNOSIS — I11.9 BENIGN HYPERTENSIVE HEART DISEASE WITHOUT CONGESTIVE HEART FAILURE: Primary | ICD-10-CM

## 2021-08-19 ENCOUNTER — HOSPITAL ENCOUNTER (OUTPATIENT)
Dept: CARDIOLOGY | Facility: HOSPITAL | Age: 76
Discharge: HOME | End: 2021-08-19
Payer: MEDICARE

## 2021-08-19 ENCOUNTER — HOSPITAL ENCOUNTER (OUTPATIENT)
Dept: CARDIOLOGY | Facility: HOSPITAL | Age: 76
Discharge: HOME | End: 2021-08-19
Attending: FAMILY MEDICINE
Payer: MEDICARE

## 2021-08-19 ENCOUNTER — OFFICE VISIT (OUTPATIENT)
Dept: CARDIOLOGY | Facility: CLINIC | Age: 76
End: 2021-08-19
Payer: MEDICARE

## 2021-08-19 ENCOUNTER — HOSPITAL ENCOUNTER (OUTPATIENT)
Dept: RADIOLOGY | Facility: HOSPITAL | Age: 76
Discharge: HOME | End: 2021-08-19
Attending: FAMILY MEDICINE
Payer: MEDICARE

## 2021-08-19 VITALS
WEIGHT: 152.13 LBS | HEART RATE: 85 BPM | SYSTOLIC BLOOD PRESSURE: 122 MMHG | DIASTOLIC BLOOD PRESSURE: 68 MMHG | HEIGHT: 64 IN | OXYGEN SATURATION: 96 % | BODY MASS INDEX: 25.97 KG/M2

## 2021-08-19 VITALS
SYSTOLIC BLOOD PRESSURE: 132 MMHG | HEIGHT: 64 IN | DIASTOLIC BLOOD PRESSURE: 78 MMHG | HEART RATE: 79 BPM | WEIGHT: 152 LBS | BODY MASS INDEX: 25.95 KG/M2

## 2021-08-19 DIAGNOSIS — I11.9 BENIGN HYPERTENSIVE HEART DISEASE WITHOUT CONGESTIVE HEART FAILURE: ICD-10-CM

## 2021-08-19 DIAGNOSIS — E03.4 HYPOTHYROIDISM DUE TO ACQUIRED ATROPHY OF THYROID: ICD-10-CM

## 2021-08-19 DIAGNOSIS — R00.2 PALPITATION: Primary | ICD-10-CM

## 2021-08-19 DIAGNOSIS — G56.03 BILATERAL CARPAL TUNNEL SYNDROME: ICD-10-CM

## 2021-08-19 DIAGNOSIS — E55.9 VITAMIN D DEFICIENCY: ICD-10-CM

## 2021-08-19 DIAGNOSIS — R42 DIZZINESS: ICD-10-CM

## 2021-08-19 DIAGNOSIS — H40.9 MODERATE STAGE GLAUCOMA: ICD-10-CM

## 2021-08-19 DIAGNOSIS — R00.0 RAPID OR IRREGULAR HEARTBEAT: ICD-10-CM

## 2021-08-19 DIAGNOSIS — R94.31 ABNORMAL EKG: ICD-10-CM

## 2021-08-19 DIAGNOSIS — E78.5 DYSLIPIDEMIA: ICD-10-CM

## 2021-08-19 DIAGNOSIS — R55 SYNCOPE AND COLLAPSE: ICD-10-CM

## 2021-08-19 LAB
AORTIC ROOT ANNULUS: 2.92 CM
AV INDEX (PROSTH): 0.93
AV MEAN GRADIENT: 4 MMHG
AV PEAK GRADIENT: 7 MMHG
AV VALVE AREA: 3.52 CM2
AV VELOCITY RATIO: 0.93
BSA FOR ECHO PROCEDURE: 1.76 M2
CV ECHO LV RWT: 0.49 CM
DOP CALC AO PEAK VEL: 1.29 M/S
DOP CALC AO VTI: 27.21 CM
DOP CALC LVOT AREA: 3.8 CM2
DOP CALC LVOT DIAMETER: 2.19 CM
DOP CALC LVOT PEAK VEL: 1.2 M/S
DOP CALC LVOT STROKE VOLUME: 95.67 CM3
DOP CALC RVOT PEAK VEL: 0.61 M/S
DOP CALC RVOT VTI: 12.95 CM
DOP CALCLVOT PEAK VEL VTI: 25.41 CM
E WAVE DECELERATION TIME: 177.8 MSEC
E/A RATIO: 1.01
E/E' RATIO: 7.48 M/S
ECHO LV POSTERIOR WALL: 0.96 CM (ref 0.6–1.1)
EJECTION FRACTION: 60 %
FRACTIONAL SHORTENING: 29 % (ref 28–44)
INTERVENTRICULAR SEPTUM: 0.94 CM (ref 0.6–1.1)
IVRT: 111.32 MSEC
LA MAJOR: 4.52 CM
LA MINOR: 4.92 CM
LA WIDTH: 3.04 CM
LEFT ATRIUM SIZE: 2.49 CM
LEFT ATRIUM VOLUME INDEX: 17.4 ML/M2
LEFT ATRIUM VOLUME: 30.31 CM3
LEFT INTERNAL DIMENSION IN SYSTOLE: 2.77 CM (ref 2.1–4)
LEFT VENTRICLE DIASTOLIC VOLUME INDEX: 37.39 ML/M2
LEFT VENTRICLE DIASTOLIC VOLUME: 65.05 ML
LEFT VENTRICLE MASS INDEX: 65 G/M2
LEFT VENTRICLE SYSTOLIC VOLUME INDEX: 16.5 ML/M2
LEFT VENTRICLE SYSTOLIC VOLUME: 28.7 ML
LEFT VENTRICULAR INTERNAL DIMENSION IN DIASTOLE: 3.88 CM (ref 3.5–6)
LEFT VENTRICULAR MASS: 112.66 G
LV LATERAL E/E' RATIO: 7.17 M/S
LV SEPTAL E/E' RATIO: 7.82 M/S
MV A" WAVE DURATION": 10.28 MSEC
MV PEAK A VEL: 0.85 M/S
MV PEAK E VEL: 0.86 M/S
MV STENOSIS PRESSURE HALF TIME: 51.56 MS
MV VALVE AREA P 1/2 METHOD: 4.27 CM2
PULM VEIN S/D RATIO: 2.2
PV MEAN GRADIENT: 1 MMHG
PV PEAK D VEL: 0.25 M/S
PV PEAK S VEL: 0.55 M/S
PV PEAK VELOCITY: 0.71 CM/S
RA MAJOR: 3.57 CM
RA WIDTH: 3.39 CM
RIGHT VENTRICULAR END-DIASTOLIC DIMENSION: 2.06 CM
SINUS: 2.63 CM
STJ: 2.87 CM
TDI LATERAL: 0.12 M/S
TDI SEPTAL: 0.11 M/S
TDI: 0.12 M/S
TRICUSPID ANNULAR PLANE SYSTOLIC EXCURSION: 1.96 CM

## 2021-08-19 PROCEDURE — 3078F PR MOST RECENT DIASTOLIC BLOOD PRESSURE < 80 MM HG: ICD-10-PCS | Mod: CPTII,S$GLB,, | Performed by: INTERNAL MEDICINE

## 2021-08-19 PROCEDURE — 93005 ELECTROCARDIOGRAM TRACING: CPT | Mod: PN

## 2021-08-19 PROCEDURE — 1160F PR REVIEW ALL MEDS BY PRESCRIBER/CLIN PHARMACIST DOCUMENTED: ICD-10-PCS | Mod: CPTII,S$GLB,, | Performed by: INTERNAL MEDICINE

## 2021-08-19 PROCEDURE — 93010 EKG 12-LEAD: ICD-10-PCS | Mod: ,,, | Performed by: INTERNAL MEDICINE

## 2021-08-19 PROCEDURE — 93880 EXTRACRANIAL BILAT STUDY: CPT | Mod: 26,,, | Performed by: RADIOLOGY

## 2021-08-19 PROCEDURE — 93880 US CAROTID BILATERAL: ICD-10-PCS | Mod: 26,,, | Performed by: RADIOLOGY

## 2021-08-19 PROCEDURE — 93010 ELECTROCARDIOGRAM REPORT: CPT | Mod: ,,, | Performed by: INTERNAL MEDICINE

## 2021-08-19 PROCEDURE — 1126F PR PAIN SEVERITY QUANTIFIED, NO PAIN PRESENT: ICD-10-PCS | Mod: CPTII,S$GLB,, | Performed by: INTERNAL MEDICINE

## 2021-08-19 PROCEDURE — 93306 TTE W/DOPPLER COMPLETE: CPT | Mod: 26,,, | Performed by: INTERNAL MEDICINE

## 2021-08-19 PROCEDURE — 3008F BODY MASS INDEX DOCD: CPT | Mod: CPTII,S$GLB,, | Performed by: INTERNAL MEDICINE

## 2021-08-19 PROCEDURE — 1160F RVW MEDS BY RX/DR IN RCRD: CPT | Mod: CPTII,S$GLB,, | Performed by: INTERNAL MEDICINE

## 2021-08-19 PROCEDURE — 93306 ECHO (CUPID ONLY): ICD-10-PCS | Mod: 26,,, | Performed by: INTERNAL MEDICINE

## 2021-08-19 PROCEDURE — 99204 OFFICE O/P NEW MOD 45 MIN: CPT | Mod: S$GLB,,, | Performed by: INTERNAL MEDICINE

## 2021-08-19 PROCEDURE — 3078F DIAST BP <80 MM HG: CPT | Mod: CPTII,S$GLB,, | Performed by: INTERNAL MEDICINE

## 2021-08-19 PROCEDURE — 99999 PR PBB SHADOW E&M-EST. PATIENT-LVL IV: ICD-10-PCS | Mod: PBBFAC,,, | Performed by: INTERNAL MEDICINE

## 2021-08-19 PROCEDURE — 93880 EXTRACRANIAL BILAT STUDY: CPT | Mod: TC

## 2021-08-19 PROCEDURE — 3288F FALL RISK ASSESSMENT DOCD: CPT | Mod: CPTII,S$GLB,, | Performed by: INTERNAL MEDICINE

## 2021-08-19 PROCEDURE — 1159F PR MEDICATION LIST DOCUMENTED IN MEDICAL RECORD: ICD-10-PCS | Mod: CPTII,S$GLB,, | Performed by: INTERNAL MEDICINE

## 2021-08-19 PROCEDURE — 3008F PR BODY MASS INDEX (BMI) DOCUMENTED: ICD-10-PCS | Mod: CPTII,S$GLB,, | Performed by: INTERNAL MEDICINE

## 2021-08-19 PROCEDURE — 3288F PR FALLS RISK ASSESSMENT DOCUMENTED: ICD-10-PCS | Mod: CPTII,S$GLB,, | Performed by: INTERNAL MEDICINE

## 2021-08-19 PROCEDURE — 99204 PR OFFICE/OUTPT VISIT, NEW, LEVL IV, 45-59 MIN: ICD-10-PCS | Mod: S$GLB,,, | Performed by: INTERNAL MEDICINE

## 2021-08-19 PROCEDURE — 1101F PR PT FALLS ASSESS DOC 0-1 FALLS W/OUT INJ PAST YR: ICD-10-PCS | Mod: CPTII,S$GLB,, | Performed by: INTERNAL MEDICINE

## 2021-08-19 PROCEDURE — 1126F AMNT PAIN NOTED NONE PRSNT: CPT | Mod: CPTII,S$GLB,, | Performed by: INTERNAL MEDICINE

## 2021-08-19 PROCEDURE — 1101F PT FALLS ASSESS-DOCD LE1/YR: CPT | Mod: CPTII,S$GLB,, | Performed by: INTERNAL MEDICINE

## 2021-08-19 PROCEDURE — 3074F PR MOST RECENT SYSTOLIC BLOOD PRESSURE < 130 MM HG: ICD-10-PCS | Mod: CPTII,S$GLB,, | Performed by: INTERNAL MEDICINE

## 2021-08-19 PROCEDURE — 99999 PR PBB SHADOW E&M-EST. PATIENT-LVL IV: CPT | Mod: PBBFAC,,, | Performed by: INTERNAL MEDICINE

## 2021-08-19 PROCEDURE — 1159F MED LIST DOCD IN RCRD: CPT | Mod: CPTII,S$GLB,, | Performed by: INTERNAL MEDICINE

## 2021-08-19 PROCEDURE — 3074F SYST BP LT 130 MM HG: CPT | Mod: CPTII,S$GLB,, | Performed by: INTERNAL MEDICINE

## 2021-08-19 PROCEDURE — 93306 TTE W/DOPPLER COMPLETE: CPT

## 2021-09-02 ENCOUNTER — HOSPITAL ENCOUNTER (OUTPATIENT)
Dept: CARDIOLOGY | Facility: HOSPITAL | Age: 76
Discharge: HOME | End: 2021-09-02
Attending: INTERNAL MEDICINE
Payer: MEDICARE

## 2021-09-02 ENCOUNTER — HOSPITAL ENCOUNTER (OUTPATIENT)
Dept: RADIOLOGY | Facility: HOSPITAL | Age: 76
Discharge: HOME | End: 2021-09-02
Attending: INTERNAL MEDICINE
Payer: MEDICARE

## 2021-09-02 DIAGNOSIS — R94.31 ABNORMAL EKG: ICD-10-CM

## 2021-09-02 DIAGNOSIS — R00.2 PALPITATION: ICD-10-CM

## 2021-09-02 DIAGNOSIS — R42 DIZZINESS: ICD-10-CM

## 2021-09-02 LAB
CV STRESS BASE HR: 60 BPM
DIASTOLIC BLOOD PRESSURE: 82 MMHG
NUC REST EJECTION FRACTION: 81
NUC STRESS EJECTION FRACTION: 85 %
OHS CV CPX 85 PERCENT MAX PREDICTED HEART RATE MALE: 120
OHS CV CPX ESTIMATED METS: 1
OHS CV CPX MAX PREDICTED HEART RATE: 141
OHS CV CPX PATIENT IS FEMALE: 1
OHS CV CPX PATIENT IS MALE: 0
OHS CV CPX PEAK DIASTOLIC BLOOD PRESSURE: 74 MMHG
OHS CV CPX PEAK HEAR RATE: 126 BPM
OHS CV CPX PEAK RATE PRESSURE PRODUCT: NORMAL
OHS CV CPX PEAK SYSTOLIC BLOOD PRESSURE: 132 MMHG
OHS CV CPX PERCENT MAX PREDICTED HEART RATE ACHIEVED: 89
OHS CV CPX RATE PRESSURE PRODUCT PRESENTING: 8460
STRESS ECHO POST EXERCISE DUR MIN: 1 MINUTES
STRESS ECHO POST EXERCISE DUR SEC: 1 SECONDS
SYSTOLIC BLOOD PRESSURE: 141 MMHG

## 2021-09-02 PROCEDURE — 93016 STRESS TEST WITH MYOCARDIAL PERFUSION (CUPID ONLY): ICD-10-PCS | Mod: ,,, | Performed by: INTERNAL MEDICINE

## 2021-09-02 PROCEDURE — 78452 HT MUSCLE IMAGE SPECT MULT: CPT | Mod: 26,,, | Performed by: INTERNAL MEDICINE

## 2021-09-02 PROCEDURE — 93016 CV STRESS TEST SUPVJ ONLY: CPT | Mod: ,,, | Performed by: INTERNAL MEDICINE

## 2021-09-02 PROCEDURE — 78452 HT MUSCLE IMAGE SPECT MULT: CPT

## 2021-09-02 PROCEDURE — 93018 STRESS TEST WITH MYOCARDIAL PERFUSION (CUPID ONLY): ICD-10-PCS | Mod: ,,, | Performed by: INTERNAL MEDICINE

## 2021-09-02 PROCEDURE — 93225 XTRNL ECG REC<48 HRS REC: CPT

## 2021-09-02 PROCEDURE — 93017 CV STRESS TEST TRACING ONLY: CPT

## 2021-09-02 PROCEDURE — 93227 XTRNL ECG REC<48 HR R&I: CPT | Mod: ,,, | Performed by: INTERNAL MEDICINE

## 2021-09-02 PROCEDURE — 93018 CV STRESS TEST I&R ONLY: CPT | Mod: ,,, | Performed by: INTERNAL MEDICINE

## 2021-09-02 PROCEDURE — 63600175 PHARM REV CODE 636 W HCPCS: Performed by: INTERNAL MEDICINE

## 2021-09-02 PROCEDURE — 78452 STRESS TEST WITH MYOCARDIAL PERFUSION (CUPID ONLY): ICD-10-PCS | Mod: 26,,, | Performed by: INTERNAL MEDICINE

## 2021-09-02 PROCEDURE — A9502 TC99M TETROFOSMIN: HCPCS

## 2021-09-02 PROCEDURE — 93227 HOLTER MONITOR - 48 HOUR (CUPID ONLY): ICD-10-PCS | Mod: ,,, | Performed by: INTERNAL MEDICINE

## 2021-09-02 RX ORDER — REGADENOSON 0.08 MG/ML
0.4 INJECTION, SOLUTION INTRAVENOUS ONCE
Status: COMPLETED | OUTPATIENT
Start: 2021-09-02 | End: 2021-09-02

## 2021-09-02 RX ADMIN — REGADENOSON 0.4 MG: 0.08 INJECTION, SOLUTION INTRAVENOUS at 09:09

## 2021-09-03 ENCOUNTER — TELEPHONE (OUTPATIENT)
Dept: CARDIOLOGY | Facility: CLINIC | Age: 76
End: 2021-09-03

## 2021-09-07 LAB
OHS CV EVENT MONITOR DAY: 0
OHS CV HOLTER LENGTH DECIMAL HOURS: 48
OHS CV HOLTER LENGTH HOURS: 48
OHS CV HOLTER LENGTH MINUTES: 0
OHS CV HOLTER SINUS AVERAGE HR: 88
OHS CV HOLTER SINUS MAX HR: 150
OHS CV HOLTER SINUS MIN HR: 53

## 2021-09-08 ENCOUNTER — TELEPHONE (OUTPATIENT)
Dept: CARDIOLOGY | Facility: CLINIC | Age: 76
End: 2021-09-08

## 2021-09-13 ENCOUNTER — LAB VISIT (OUTPATIENT)
Dept: LAB | Facility: HOSPITAL | Age: 76
End: 2021-09-13
Attending: FAMILY MEDICINE
Payer: MEDICARE

## 2021-09-13 ENCOUNTER — OFFICE VISIT (OUTPATIENT)
Dept: PRIMARY CARE CLINIC | Facility: CLINIC | Age: 76
End: 2021-09-13
Payer: MEDICARE

## 2021-09-13 VITALS
BODY MASS INDEX: 25.83 KG/M2 | HEART RATE: 61 BPM | OXYGEN SATURATION: 97 % | DIASTOLIC BLOOD PRESSURE: 84 MMHG | SYSTOLIC BLOOD PRESSURE: 132 MMHG | WEIGHT: 150.44 LBS | TEMPERATURE: 98 F

## 2021-09-13 DIAGNOSIS — Z12.31 ENCOUNTER FOR SCREENING MAMMOGRAM FOR BREAST CANCER: ICD-10-CM

## 2021-09-13 DIAGNOSIS — R73.09 ABNORMAL GLUCOSE: ICD-10-CM

## 2021-09-13 DIAGNOSIS — E78.5 DYSLIPIDEMIA: ICD-10-CM

## 2021-09-13 DIAGNOSIS — H40.9 MODERATE STAGE GLAUCOMA: ICD-10-CM

## 2021-09-13 DIAGNOSIS — I11.9 BENIGN HYPERTENSIVE HEART DISEASE WITHOUT CONGESTIVE HEART FAILURE: ICD-10-CM

## 2021-09-13 DIAGNOSIS — M81.0 OSTEOPOROSIS, UNSPECIFIED OSTEOPOROSIS TYPE, UNSPECIFIED PATHOLOGICAL FRACTURE PRESENCE: ICD-10-CM

## 2021-09-13 DIAGNOSIS — Z00.00 ROUTINE GENERAL MEDICAL EXAMINATION AT A HEALTH CARE FACILITY: Primary | ICD-10-CM

## 2021-09-13 DIAGNOSIS — K44.9 HIATAL HERNIA: ICD-10-CM

## 2021-09-13 DIAGNOSIS — E03.4 HYPOTHYROIDISM DUE TO ACQUIRED ATROPHY OF THYROID: ICD-10-CM

## 2021-09-13 DIAGNOSIS — E55.9 VITAMIN D DEFICIENCY: ICD-10-CM

## 2021-09-13 DIAGNOSIS — I49.9 SUPRAVENTRICULAR DYSRHYTHMIA: ICD-10-CM

## 2021-09-13 PROCEDURE — 36415 COLL VENOUS BLD VENIPUNCTURE: CPT | Mod: HCNC,PN | Performed by: FAMILY MEDICINE

## 2021-09-13 PROCEDURE — 3075F PR MOST RECENT SYSTOLIC BLOOD PRESS GE 130-139MM HG: ICD-10-PCS | Mod: HCNC,CPTII,S$GLB, | Performed by: FAMILY MEDICINE

## 2021-09-13 PROCEDURE — 1159F MED LIST DOCD IN RCRD: CPT | Mod: HCNC,CPTII,S$GLB, | Performed by: FAMILY MEDICINE

## 2021-09-13 PROCEDURE — 1126F PR PAIN SEVERITY QUANTIFIED, NO PAIN PRESENT: ICD-10-PCS | Mod: HCNC,CPTII,S$GLB, | Performed by: FAMILY MEDICINE

## 2021-09-13 PROCEDURE — 1160F PR REVIEW ALL MEDS BY PRESCRIBER/CLIN PHARMACIST DOCUMENTED: ICD-10-PCS | Mod: HCNC,CPTII,S$GLB, | Performed by: FAMILY MEDICINE

## 2021-09-13 PROCEDURE — 80061 LIPID PANEL: CPT | Mod: HCNC | Performed by: FAMILY MEDICINE

## 2021-09-13 PROCEDURE — 3008F BODY MASS INDEX DOCD: CPT | Mod: HCNC,CPTII,S$GLB, | Performed by: FAMILY MEDICINE

## 2021-09-13 PROCEDURE — 99397 PR PREVENTIVE VISIT,EST,65 & OVER: ICD-10-PCS | Mod: HCNC,S$GLB,, | Performed by: FAMILY MEDICINE

## 2021-09-13 PROCEDURE — 99397 PER PM REEVAL EST PAT 65+ YR: CPT | Mod: HCNC,S$GLB,, | Performed by: FAMILY MEDICINE

## 2021-09-13 PROCEDURE — 99999 PR PBB SHADOW E&M-EST. PATIENT-LVL III: CPT | Mod: PBBFAC,HCNC,, | Performed by: FAMILY MEDICINE

## 2021-09-13 PROCEDURE — 3079F DIAST BP 80-89 MM HG: CPT | Mod: HCNC,CPTII,S$GLB, | Performed by: FAMILY MEDICINE

## 2021-09-13 PROCEDURE — 3008F PR BODY MASS INDEX (BMI) DOCUMENTED: ICD-10-PCS | Mod: HCNC,CPTII,S$GLB, | Performed by: FAMILY MEDICINE

## 2021-09-13 PROCEDURE — 1126F AMNT PAIN NOTED NONE PRSNT: CPT | Mod: HCNC,CPTII,S$GLB, | Performed by: FAMILY MEDICINE

## 2021-09-13 PROCEDURE — 3288F PR FALLS RISK ASSESSMENT DOCUMENTED: ICD-10-PCS | Mod: HCNC,CPTII,S$GLB, | Performed by: FAMILY MEDICINE

## 2021-09-13 PROCEDURE — 1101F PT FALLS ASSESS-DOCD LE1/YR: CPT | Mod: HCNC,CPTII,S$GLB, | Performed by: FAMILY MEDICINE

## 2021-09-13 PROCEDURE — 1101F PR PT FALLS ASSESS DOC 0-1 FALLS W/OUT INJ PAST YR: ICD-10-PCS | Mod: HCNC,CPTII,S$GLB, | Performed by: FAMILY MEDICINE

## 2021-09-13 PROCEDURE — 99999 PR PBB SHADOW E&M-EST. PATIENT-LVL III: ICD-10-PCS | Mod: PBBFAC,HCNC,, | Performed by: FAMILY MEDICINE

## 2021-09-13 PROCEDURE — 3075F SYST BP GE 130 - 139MM HG: CPT | Mod: HCNC,CPTII,S$GLB, | Performed by: FAMILY MEDICINE

## 2021-09-13 PROCEDURE — 3288F FALL RISK ASSESSMENT DOCD: CPT | Mod: HCNC,CPTII,S$GLB, | Performed by: FAMILY MEDICINE

## 2021-09-13 PROCEDURE — 1160F RVW MEDS BY RX/DR IN RCRD: CPT | Mod: HCNC,CPTII,S$GLB, | Performed by: FAMILY MEDICINE

## 2021-09-13 PROCEDURE — 83036 HEMOGLOBIN GLYCOSYLATED A1C: CPT | Mod: HCNC | Performed by: FAMILY MEDICINE

## 2021-09-13 PROCEDURE — 3079F PR MOST RECENT DIASTOLIC BLOOD PRESSURE 80-89 MM HG: ICD-10-PCS | Mod: HCNC,CPTII,S$GLB, | Performed by: FAMILY MEDICINE

## 2021-09-13 PROCEDURE — 1159F PR MEDICATION LIST DOCUMENTED IN MEDICAL RECORD: ICD-10-PCS | Mod: HCNC,CPTII,S$GLB, | Performed by: FAMILY MEDICINE

## 2021-09-14 LAB
CHOLEST SERPL-MCNC: 173 MG/DL (ref 120–199)
CHOLEST/HDLC SERPL: 2.5 {RATIO} (ref 2–5)
ESTIMATED AVG GLUCOSE: 117 MG/DL (ref 68–131)
HBA1C MFR BLD: 5.7 % (ref 4–5.6)
HDLC SERPL-MCNC: 68 MG/DL (ref 40–75)
HDLC SERPL: 39.3 % (ref 20–50)
LDLC SERPL CALC-MCNC: 89.4 MG/DL (ref 63–159)
NONHDLC SERPL-MCNC: 105 MG/DL
TRIGL SERPL-MCNC: 78 MG/DL (ref 30–150)

## 2021-09-16 ENCOUNTER — HOSPITAL ENCOUNTER (OUTPATIENT)
Dept: RADIOLOGY | Facility: HOSPITAL | Age: 76
Discharge: HOME | End: 2021-09-16
Attending: FAMILY MEDICINE
Payer: MEDICARE

## 2021-09-16 ENCOUNTER — PATIENT OUTREACH (OUTPATIENT)
Dept: ADMINISTRATIVE | Facility: OTHER | Age: 76
End: 2021-09-16

## 2021-09-16 DIAGNOSIS — Z12.31 ENCOUNTER FOR SCREENING MAMMOGRAM FOR MALIGNANT NEOPLASM OF BREAST: Primary | ICD-10-CM

## 2021-09-16 DIAGNOSIS — Z12.31 ENCOUNTER FOR SCREENING MAMMOGRAM FOR BREAST CANCER: ICD-10-CM

## 2021-09-16 PROCEDURE — 77067 MAMMO DIGITAL SCREENING BILAT WITH TOMO: ICD-10-PCS | Mod: 26,HCNC,, | Performed by: RADIOLOGY

## 2021-09-16 PROCEDURE — 77063 MAMMO DIGITAL SCREENING BILAT WITH TOMO: ICD-10-PCS | Mod: 26,HCNC,, | Performed by: RADIOLOGY

## 2021-09-16 PROCEDURE — 77067 SCR MAMMO BI INCL CAD: CPT | Mod: TC,HCNC

## 2021-09-16 PROCEDURE — 77063 BREAST TOMOSYNTHESIS BI: CPT | Mod: 26,HCNC,, | Performed by: RADIOLOGY

## 2021-09-16 PROCEDURE — 77067 SCR MAMMO BI INCL CAD: CPT | Mod: 26,HCNC,, | Performed by: RADIOLOGY

## 2021-09-17 ENCOUNTER — OFFICE VISIT (OUTPATIENT)
Dept: CARDIOLOGY | Facility: CLINIC | Age: 76
End: 2021-09-17
Payer: MEDICARE

## 2021-09-17 VITALS
SYSTOLIC BLOOD PRESSURE: 126 MMHG | HEIGHT: 64 IN | DIASTOLIC BLOOD PRESSURE: 76 MMHG | OXYGEN SATURATION: 98 % | BODY MASS INDEX: 26.2 KG/M2 | WEIGHT: 153.44 LBS | HEART RATE: 81 BPM | RESPIRATION RATE: 16 BRPM

## 2021-09-17 DIAGNOSIS — R94.31 ABNORMAL EKG: Primary | ICD-10-CM

## 2021-09-17 DIAGNOSIS — R73.09 ABNORMAL GLUCOSE: ICD-10-CM

## 2021-09-17 DIAGNOSIS — E03.4 HYPOTHYROIDISM DUE TO ACQUIRED ATROPHY OF THYROID: ICD-10-CM

## 2021-09-17 DIAGNOSIS — R00.2 PALPITATION: ICD-10-CM

## 2021-09-17 DIAGNOSIS — I49.9 SUPRAVENTRICULAR DYSRHYTHMIA: ICD-10-CM

## 2021-09-17 DIAGNOSIS — E78.5 DYSLIPIDEMIA: ICD-10-CM

## 2021-09-17 DIAGNOSIS — I11.9 BENIGN HYPERTENSIVE HEART DISEASE WITHOUT CONGESTIVE HEART FAILURE: ICD-10-CM

## 2021-09-17 DIAGNOSIS — E55.9 VITAMIN D DEFICIENCY: ICD-10-CM

## 2021-09-17 PROCEDURE — 99999 PR PBB SHADOW E&M-EST. PATIENT-LVL IV: ICD-10-PCS | Mod: PBBFAC,HCNC,, | Performed by: INTERNAL MEDICINE

## 2021-09-17 PROCEDURE — 3044F HG A1C LEVEL LT 7.0%: CPT | Mod: HCNC,CPTII,S$GLB, | Performed by: INTERNAL MEDICINE

## 2021-09-17 PROCEDURE — 1159F MED LIST DOCD IN RCRD: CPT | Mod: HCNC,CPTII,S$GLB, | Performed by: INTERNAL MEDICINE

## 2021-09-17 PROCEDURE — 1160F PR REVIEW ALL MEDS BY PRESCRIBER/CLIN PHARMACIST DOCUMENTED: ICD-10-PCS | Mod: HCNC,CPTII,S$GLB, | Performed by: INTERNAL MEDICINE

## 2021-09-17 PROCEDURE — 3078F PR MOST RECENT DIASTOLIC BLOOD PRESSURE < 80 MM HG: ICD-10-PCS | Mod: HCNC,CPTII,S$GLB, | Performed by: INTERNAL MEDICINE

## 2021-09-17 PROCEDURE — 3008F BODY MASS INDEX DOCD: CPT | Mod: HCNC,CPTII,S$GLB, | Performed by: INTERNAL MEDICINE

## 2021-09-17 PROCEDURE — 3044F PR MOST RECENT HEMOGLOBIN A1C LEVEL <7.0%: ICD-10-PCS | Mod: HCNC,CPTII,S$GLB, | Performed by: INTERNAL MEDICINE

## 2021-09-17 PROCEDURE — 3008F PR BODY MASS INDEX (BMI) DOCUMENTED: ICD-10-PCS | Mod: HCNC,CPTII,S$GLB, | Performed by: INTERNAL MEDICINE

## 2021-09-17 PROCEDURE — 99213 OFFICE O/P EST LOW 20 MIN: CPT | Mod: HCNC,S$GLB,, | Performed by: INTERNAL MEDICINE

## 2021-09-17 PROCEDURE — 3074F PR MOST RECENT SYSTOLIC BLOOD PRESSURE < 130 MM HG: ICD-10-PCS | Mod: HCNC,CPTII,S$GLB, | Performed by: INTERNAL MEDICINE

## 2021-09-17 PROCEDURE — 1126F AMNT PAIN NOTED NONE PRSNT: CPT | Mod: HCNC,CPTII,S$GLB, | Performed by: INTERNAL MEDICINE

## 2021-09-17 PROCEDURE — 1159F PR MEDICATION LIST DOCUMENTED IN MEDICAL RECORD: ICD-10-PCS | Mod: HCNC,CPTII,S$GLB, | Performed by: INTERNAL MEDICINE

## 2021-09-17 PROCEDURE — 1126F PR PAIN SEVERITY QUANTIFIED, NO PAIN PRESENT: ICD-10-PCS | Mod: HCNC,CPTII,S$GLB, | Performed by: INTERNAL MEDICINE

## 2021-09-17 PROCEDURE — 3078F DIAST BP <80 MM HG: CPT | Mod: HCNC,CPTII,S$GLB, | Performed by: INTERNAL MEDICINE

## 2021-09-17 PROCEDURE — 1160F RVW MEDS BY RX/DR IN RCRD: CPT | Mod: HCNC,CPTII,S$GLB, | Performed by: INTERNAL MEDICINE

## 2021-09-17 PROCEDURE — 99999 PR PBB SHADOW E&M-EST. PATIENT-LVL IV: CPT | Mod: PBBFAC,HCNC,, | Performed by: INTERNAL MEDICINE

## 2021-09-17 PROCEDURE — 3074F SYST BP LT 130 MM HG: CPT | Mod: HCNC,CPTII,S$GLB, | Performed by: INTERNAL MEDICINE

## 2021-09-17 PROCEDURE — 99213 PR OFFICE/OUTPT VISIT, EST, LEVL III, 20-29 MIN: ICD-10-PCS | Mod: HCNC,S$GLB,, | Performed by: INTERNAL MEDICINE

## 2021-09-17 RX ORDER — METOPROLOL SUCCINATE 25 MG/1
25 TABLET, EXTENDED RELEASE ORAL DAILY
Qty: 90 TABLET | Refills: 3 | Status: SHIPPED | OUTPATIENT
Start: 2021-09-17 | End: 2022-08-06

## 2021-12-16 ENCOUNTER — PATIENT OUTREACH (OUTPATIENT)
Dept: ADMINISTRATIVE | Facility: OTHER | Age: 76
End: 2021-12-16
Payer: MEDICARE

## 2021-12-17 ENCOUNTER — OFFICE VISIT (OUTPATIENT)
Dept: CARDIOLOGY | Facility: CLINIC | Age: 76
End: 2021-12-17
Payer: MEDICARE

## 2021-12-17 VITALS
HEART RATE: 66 BPM | DIASTOLIC BLOOD PRESSURE: 80 MMHG | WEIGHT: 148.81 LBS | HEIGHT: 64 IN | SYSTOLIC BLOOD PRESSURE: 120 MMHG | OXYGEN SATURATION: 97 % | BODY MASS INDEX: 25.41 KG/M2

## 2021-12-17 DIAGNOSIS — E78.5 DYSLIPIDEMIA: ICD-10-CM

## 2021-12-17 DIAGNOSIS — I11.9 BENIGN HYPERTENSIVE HEART DISEASE WITHOUT CONGESTIVE HEART FAILURE: Primary | ICD-10-CM

## 2021-12-17 DIAGNOSIS — I49.9 SUPRAVENTRICULAR DYSRHYTHMIA: ICD-10-CM

## 2021-12-17 DIAGNOSIS — R00.2 PALPITATION: ICD-10-CM

## 2021-12-17 DIAGNOSIS — R42 DIZZINESS: ICD-10-CM

## 2021-12-17 DIAGNOSIS — E03.4 HYPOTHYROIDISM DUE TO ACQUIRED ATROPHY OF THYROID: ICD-10-CM

## 2021-12-17 DIAGNOSIS — R73.09 ABNORMAL GLUCOSE: ICD-10-CM

## 2021-12-17 DIAGNOSIS — R94.31 ABNORMAL EKG: ICD-10-CM

## 2021-12-17 PROCEDURE — 99999 PR PBB SHADOW E&M-EST. PATIENT-LVL III: CPT | Mod: PBBFAC,HCNC,, | Performed by: INTERNAL MEDICINE

## 2021-12-17 PROCEDURE — 99999 PR PBB SHADOW E&M-EST. PATIENT-LVL III: ICD-10-PCS | Mod: PBBFAC,HCNC,, | Performed by: INTERNAL MEDICINE

## 2021-12-17 PROCEDURE — 99213 OFFICE O/P EST LOW 20 MIN: CPT | Mod: HCNC,S$GLB,, | Performed by: INTERNAL MEDICINE

## 2021-12-17 PROCEDURE — 99213 PR OFFICE/OUTPT VISIT, EST, LEVL III, 20-29 MIN: ICD-10-PCS | Mod: HCNC,S$GLB,, | Performed by: INTERNAL MEDICINE

## 2021-12-17 PROCEDURE — 99499 UNLISTED E&M SERVICE: CPT | Mod: S$GLB,,, | Performed by: INTERNAL MEDICINE

## 2021-12-17 PROCEDURE — 99499 RISK ADDL DX/OHS AUDIT: ICD-10-PCS | Mod: S$GLB,,, | Performed by: INTERNAL MEDICINE

## 2022-01-03 ENCOUNTER — IMMUNIZATION (OUTPATIENT)
Dept: INTERNAL MEDICINE | Facility: CLINIC | Age: 77
End: 2022-01-03
Payer: MEDICARE

## 2022-01-03 PROCEDURE — G0008 FLU VACCINE - QUADRIVALENT - ADJUVANTED: ICD-10-PCS | Mod: HCNC,S$GLB,, | Performed by: INTERNAL MEDICINE

## 2022-01-03 PROCEDURE — 90694 FLU VACCINE - QUADRIVALENT - ADJUVANTED: ICD-10-PCS | Mod: HCNC,S$GLB,, | Performed by: INTERNAL MEDICINE

## 2022-01-03 PROCEDURE — 90694 VACC AIIV4 NO PRSRV 0.5ML IM: CPT | Mod: HCNC,S$GLB,, | Performed by: INTERNAL MEDICINE

## 2022-01-03 PROCEDURE — G0008 ADMIN INFLUENZA VIRUS VAC: HCPCS | Mod: HCNC,S$GLB,, | Performed by: INTERNAL MEDICINE

## 2022-01-25 DIAGNOSIS — E78.5 DYSLIPIDEMIA: ICD-10-CM

## 2022-01-26 NOTE — TELEPHONE ENCOUNTER
No new care gaps identified.  Powered by [employer]. Reference number: [id number].   1/25/2022 8:30:42 PM CST

## 2022-01-28 DIAGNOSIS — E78.5 DYSLIPIDEMIA: ICD-10-CM

## 2022-01-28 RX ORDER — PRAVASTATIN SODIUM 40 MG/1
40 TABLET ORAL DAILY
Qty: 90 TABLET | Refills: 3 | Status: SHIPPED | OUTPATIENT
Start: 2022-01-28 | End: 2022-01-31 | Stop reason: SDUPTHER

## 2022-01-28 NOTE — TELEPHONE ENCOUNTER
No new care gaps identified.  Powered by [employer]. Reference number: [id number].   1/28/2022 11:50:31 AM CST

## 2022-01-28 NOTE — TELEPHONE ENCOUNTER
----- Message from Mike Doyle sent at 1/28/2022 11:47 AM CST -----  Contact: Iliana  Pt is requesting a call in regards to refilling her pravastatin. Pt stated that [facility] is requesting a new script. Please call her back at [phone].              [facility] - [place], [place] - [address]  [address] [address]  Phone: [phone] Fax: [phone]             Thanks  DD

## 2022-01-31 DIAGNOSIS — E78.5 DYSLIPIDEMIA: ICD-10-CM

## 2022-01-31 RX ORDER — PRAVASTATIN SODIUM 40 MG/1
40 TABLET ORAL DAILY
Qty: 90 TABLET | Refills: 3 | Status: SHIPPED | OUTPATIENT
Start: 2022-01-31 | End: 2022-03-15 | Stop reason: SDUPTHER

## 2022-01-31 NOTE — TELEPHONE ENCOUNTER
----- Message from Debi Acotsa sent at 1/31/2022  4:11 PM CST -----  Contact: self [phone]  Requesting an RX refill or new RX.  Is this a refill or new RX: refill  RX name and strength (copy/paste from chart):  pravastatin (PRAVACHOL) 40 MG tablet  Is this a 30 day or 90 day RX:   Pharmacy name and phone # (copy/paste from chart):    [facility] - [facility] [address]  [address] [address]  Phone: [phone] Fax: [phone]      The doctors have asked that we provide their patients with the following 2 reminders -- prescription refills can take up to 72 hours, and a friendly reminder that in the future you can use your [facility] account to request refills: yes    Pt stated was suppose to be sent to [facility] not [facility]    Please call and advise

## 2022-02-08 RX ORDER — PRAVASTATIN SODIUM 40 MG/1
TABLET ORAL
Qty: 90 TABLET | Refills: 1 | Status: SHIPPED | OUTPATIENT
Start: 2022-02-08 | End: 2022-07-20

## 2022-02-08 NOTE — TELEPHONE ENCOUNTER
Refill Authorization Note   Iliana Mariano  is requesting a refill authorization.  Brief Assessment and Rationale for Refill:  Approve     Medication Therapy Plan:       Medication Reconciliation Completed: No   Comments:   --->Care Gap information included below if applicable.   Orders Placed This Encounter    pravastatin (PRAVACHOL) 40 MG tablet      Requested Prescriptions   Signed Prescriptions Disp Refills    pravastatin (PRAVACHOL) 40 MG tablet 90 tablet 1     Sig: TAKE 1 TABLET EVERY DAY       Cardiovascular:  Antilipid - Statins Passed - 1/25/2022  8:30 PM        Passed - Patient is at least 18 years old        Passed - Valid encounter within last 15 months     Recent Visits  Date Type Provider Dept   09/13/21 Office Visit Breonna Borja MD [facility]   08/12/21 Office Visit Breonna Borja MD [facility]   11/19/20 Office Visit Breonna Borja MD [facility]   Showing recent visits within past 720 days and meeting all other requirements  Future Appointments  No visits were found meeting these conditions.  Showing future appointments within next 150 days and meeting all other requirements                Passed - ALT is 131 or below and within 360 days     ALT   Date Value Ref Range Status   08/13/2021 17 10 - 44 U/L Final   09/17/2020 15 10 - 44 U/L Final   06/24/2019 23 10 - 44 U/L Final              Passed - AST is 119 or below and within 360 days     AST   Date Value Ref Range Status   08/13/2021 19 10 - 40 U/L Final   09/17/2020 19 10 - 40 U/L Final   06/24/2019 28 10 - 40 U/L Final              Passed - Total Cholesterol within 360 days     Lab Results   Component Value Date    CHOL 173 09/13/2021    CHOL 195 09/17/2020    CHOL 183 06/24/2019              Passed - LDL within 360 days     LDL Cholesterol   Date Value Ref Range Status   09/13/2021 89.4 63.0 - 159.0 mg/dL Final     Comment:     The National Cholesterol Education Program (NCEP) has set the  following guidelines  (reference values) for LDL Cholesterol:  Optimal.......................<130 mg/dL  Borderline High...............130-159 mg/dL  High..........................160-189 mg/dL  Very High.....................>190 mg/dL              Passed - HDL within 360 days     HDL   Date Value Ref Range Status   09/13/2021 68 40 - 75 mg/dL Final     Comment:     The National Cholesterol Education Program (NCEP) has set the  following guidelines (reference values) for HDL Cholesterol:  Low...............<40 mg/dL  Optimal...........>60 mg/dL              Passed - Triglycerides within 360 days     Lab Results   Component Value Date    TRIG 78 09/13/2021    TRIG 82 09/17/2020    TRIG 72 06/24/2019                  Appointments  past 12m or future 3m with PCP    Date Provider   Last Visit   9/13/2021 Breonna Borja MD   Next Visit   1/28/2022 Breonna Borja MD   ED visits in past 90 days: 0     Note composed:11:58 AM 02/08/2022

## 2022-03-15 ENCOUNTER — PATIENT MESSAGE (OUTPATIENT)
Dept: PRIMARY CARE CLINIC | Facility: CLINIC | Age: 77
End: 2022-03-15

## 2022-03-15 ENCOUNTER — TELEPHONE (OUTPATIENT)
Dept: PRIMARY CARE CLINIC | Facility: CLINIC | Age: 77
End: 2022-03-15
Payer: MEDICARE

## 2022-03-15 ENCOUNTER — OFFICE VISIT (OUTPATIENT)
Dept: PRIMARY CARE CLINIC | Facility: CLINIC | Age: 77
End: 2022-03-15
Payer: MEDICARE

## 2022-03-15 DIAGNOSIS — N32.89 BLADDER SPASMS: ICD-10-CM

## 2022-03-15 DIAGNOSIS — I11.9 HEART DISEASE, HYPERTENSIVE, BENIGN, WITHOUT HEART FAILURE: ICD-10-CM

## 2022-03-15 DIAGNOSIS — R30.0 DYSURIA: Primary | ICD-10-CM

## 2022-03-15 PROCEDURE — 1159F PR MEDICATION LIST DOCUMENTED IN MEDICAL RECORD: ICD-10-PCS | Mod: CPTII,95,, | Performed by: FAMILY MEDICINE

## 2022-03-15 PROCEDURE — 99214 OFFICE O/P EST MOD 30 MIN: CPT | Mod: 95,,, | Performed by: FAMILY MEDICINE

## 2022-03-15 PROCEDURE — 99214 PR OFFICE/OUTPT VISIT, EST, LEVL IV, 30-39 MIN: ICD-10-PCS | Mod: 95,,, | Performed by: FAMILY MEDICINE

## 2022-03-15 PROCEDURE — 1159F MED LIST DOCD IN RCRD: CPT | Mod: CPTII,95,, | Performed by: FAMILY MEDICINE

## 2022-03-15 PROCEDURE — 1160F RVW MEDS BY RX/DR IN RCRD: CPT | Mod: CPTII,95,, | Performed by: FAMILY MEDICINE

## 2022-03-15 PROCEDURE — 1160F PR REVIEW ALL MEDS BY PRESCRIBER/CLIN PHARMACIST DOCUMENTED: ICD-10-PCS | Mod: CPTII,95,, | Performed by: FAMILY MEDICINE

## 2022-03-15 RX ORDER — TRIAMTERENE/HYDROCHLOROTHIAZID 37.5-25 MG
TABLET ORAL
Qty: 135 TABLET | Refills: 3 | Status: SHIPPED | OUTPATIENT
Start: 2022-03-15 | End: 2023-04-11

## 2022-03-15 RX ORDER — PHENAZOPYRIDINE HYDROCHLORIDE 200 MG/1
200 TABLET, FILM COATED ORAL 3 TIMES DAILY PRN
Qty: 9 TABLET | Refills: 1 | Status: SHIPPED | OUTPATIENT
Start: 2022-03-15 | End: 2022-03-18

## 2022-03-15 RX ORDER — INFLUENZA VACCINE, ADJUVANTED 15; 15; 15; 15 UG/.5ML; UG/.5ML; UG/.5ML; UG/.5ML
INJECTION, SUSPENSION INTRAMUSCULAR
COMMUNITY
Start: 2022-01-03 | End: 2023-03-28

## 2022-03-15 RX ORDER — CIPROFLOXACIN 500 MG/1
500 TABLET ORAL 2 TIMES DAILY
Qty: 14 TABLET | Refills: 0 | Status: SHIPPED | OUTPATIENT
Start: 2022-03-15 | End: 2022-03-22

## 2022-03-15 RX ORDER — PHENAZOPYRIDINE HYDROCHLORIDE 200 MG/1
200 TABLET, FILM COATED ORAL 3 TIMES DAILY PRN
Qty: 9 TABLET | Refills: 1 | Status: SHIPPED | OUTPATIENT
Start: 2022-03-15 | End: 2022-03-15 | Stop reason: SDUPTHER

## 2022-03-15 NOTE — PROGRESS NOTES
Subjective:      Patient ID: Iliana Mariano is a 76 y.o. female.    Chief Complaint: Urinary Tract Infection    Disclaimer:  This note is prepared using voice recognition software and as such is likely to have errors and has not been proof read. Please contact me for questions.     The patient location is: home  The chief complaint leading to consultation is: mychart request     Visit type: video and audio simultaneous    Face to Face time with patient:323 pm - 332pm     31  minutes of total time spent on the encounter, which includes face to face time and non-face to face time preparing to see the patient (eg, review of tests), Obtaining and/or reviewing separately obtained history, Documenting clinical information in the electronic or other health record, Independently interpreting results (not separately reported) and communicating results to the patient/family/caregiver, or Care coordination (not separately reported).     Each patient to whom he or she provides medical services by telemedicine is:  (1) informed of the relationship between the physician and patient and the respective role of any other health care provider with respect to management of the patient; and (2) notified that he or she may decline to receive medical services by telemedicine and may withdraw from such care at any time.    Feels like a uti.   Woke up this am with pain in abdomen. For 2 hours, had to urinate for 3 hrs. With spasms and with chill.   Took 2 azo pills and drank cranberry pill and aleve and took edge off. Still having some spasms and pink discharge.   Been yrs since uti.   No sex intercourse for 24 yrs.   No long travel.   [facility]da[facility] in hospital with kidney infection.   Reg bm's.       bp doing ok.   Needs refill of meds.   On generic thyroid meds.  Think it may be causing cough at times.   No changes to meds though.       Abdominal Pain  This is a new problem. The current episode started today. The onset quality is  sudden. The problem occurs rarely. The problem has been rapidly worsening. The pain is located in the LLQ. The pain is severe. The quality of the pain is sharp. Associated symptoms include dysuria and frequency. Pertinent negatives include no anorexia, arthralgias, belching, diarrhea, fever, flatus, headaches, hematochezia, hematuria, melena, myalgias, vomiting or weight loss. The pain is aggravated by urination. The pain is relieved by nothing. She has tried nothing for the symptoms. There is no history of abdominal surgery, colon cancer, Crohn's disease, gallstones, GERD, irritable bowel syndrome, pancreatitis, PUD or ulcerative colitis. Patient's medical history does not include kidney stones and UTI.       Lab Results   Component Value Date    WBC 8.49 08/13/2021    HGB 13.8 08/13/2021    HCT 44.6 08/13/2021     08/13/2021    CHOL 173 09/13/2021    TRIG 78 09/13/2021    HDL 68 09/13/2021    ALT 17 08/13/2021    AST 19 08/13/2021     08/13/2021    K 3.9 08/13/2021     08/13/2021    CREATININE 0.9 08/13/2021    BUN 13 08/13/2021    CO2 28 08/13/2021    TSH 1.391 08/13/2021    HGBA1C 5.7 (H) 09/13/2021       Mammo Digital Screening Bilat w/ Jayy  Narrative: Result:  Mammo Digital Screening Bilat w/ Jayy    History:  Patient is 75 y.o. and is seen for a screening mammogram.    Films Compared:  Compared to: 08/05/2019 Mammo Digital Screening Bilat w/ Jayy, 08/03/2017   Mammo Digital Screening Bilat with Tomosynthesis_CAD, 06/30/2015 Mammo   Digital Screening Bilat with CAD, and 12/20/2013 Mammo Digital Screening   Bilat with CAD     Findings:   This procedure was performed using tomosynthesis.   Computer-aided detection was utilized in the interpretation of this   examination.    The breasts have scattered areas of fibroglandular density. There is no   evidence of suspicious masses, microcalcifications or architectural   distortion.  Impression:    No mammographic evidence of malignancy.    BI-RADS  Category 1: Negative    Recommendation:  Routine screening mammogram in 1 year is recommended.    Your estimated lifetime risk of breast cancer (to age 85) based on   Tyrer-Cuzick risk assessment model is Tyrer-Cuzick: 3.23 %. According to   the American Cancer Society, patients with a lifetime breast cancer risk   of 20% or higher might benefit from supplemental screening tests.        Review of Systems   Constitutional: Negative for fever and weight loss.   Gastrointestinal: Positive for abdominal pain. Negative for anorexia, diarrhea, flatus, hematochezia, melena and vomiting.   Genitourinary: Positive for dysuria and frequency. Negative for hematuria.   Musculoskeletal: Negative for arthralgias and myalgias.   Neurological: Negative for headaches.     Objective:   There were no vitals filed for this visit.  Physical Exam  Vitals reviewed.   Constitutional:       General: She is awake. She is not in acute distress.     Appearance: Normal appearance. She is well-developed, well-groomed and normal weight. She is not ill-appearing.   HENT:      Head: Normocephalic and atraumatic.      Right Ear: External ear normal.      Left Ear: External ear normal.      Nose: Nose normal.      Mouth/Throat:      Lips: Pink.   Eyes:      Conjunctiva/sclera: Conjunctivae normal.   Pulmonary:      Effort: Pulmonary effort is normal.   Neurological:      Mental Status: She is alert.   Psychiatric:         Attention and Perception: Attention and perception normal. She is attentive.         Mood and Affect: Mood and affect normal. Mood is not anxious or depressed. Affect is not labile, blunt, angry or inappropriate.         Speech: Speech normal. She is communicative. Speech is not rapid and pressured, delayed, slurred or tangential.         Behavior: Behavior normal. Behavior is not agitated, slowed, aggressive, withdrawn, hyperactive or combative. Behavior is cooperative.         Thought Content: Thought content normal. Thought  content is not paranoid or delusional. Thought content does not include homicidal or suicidal ideation. Thought content does not include homicidal or suicidal plan.         Cognition and Memory: Cognition and memory normal. Memory is not impaired. She does not exhibit impaired recent memory or impaired remote memory.         Judgment: Judgment normal. Judgment is not impulsive or inappropriate.       Assessment:     1. Dysuria    2. Bladder spasms    3. Heart disease, hypertensive, benign, without heart failure      Plan:   Iliana was seen today for urinary tract infection.    Diagnoses and all orders for this visit:    Dysuria  Comments:  will send in cipro 1 tablet po bid 7 days, pyridium also for pain, push fluids and water. followup if not improving in 3-7 days for culture.     Bladder spasms  Comments:  will send in cipro 1 tablet po bid 7 days, pyridium also for pain, push fluids and water. followup if not improving in 3-7 days for culture.     Heart disease, hypertensive, benign, without heart failure  Comments:  refilled triameterene hctz to [facility]   Orders:  -     triamterene-hydrochlorothiazide 37.5-25 mg (MAXZIDE-25) 37.5-25 mg per tablet; TAKE 1 AND 1/2 TABLETS ONE TIME DAILY    Other orders  -     ciprofloxacin HCl (CIPRO) 500 MG tablet; Take 1 tablet (500 mg total) by mouth 2 (two) times daily. for 7 days  -     Discontinue: phenazopyridine (PYRIDIUM) 200 MG tablet; Take 1 tablet (200 mg total) by mouth 3 (three) times daily as needed for Pain.  -     phenazopyridine (PYRIDIUM) 200 MG tablet; Take 1 tablet (200 mg total) by mouth 3 (three) times daily as needed for Pain.            No follow-ups on file.    There are no Patient Instructions on file for this visit.

## 2022-03-15 NOTE — TELEPHONE ENCOUNTER
----- Message from Karley Rodriguez sent at 3/15/2022 10:02 AM CDT -----  Contact: SELF  Type:  Needs Medical Advice    Who Called:  Iliana Mariano   Symptoms (please be specific):  UTI w/ spasms and frequent urination   How long has patient had these symptoms:  This morning    Pharmacy name and phone #:     [id number] - [place] - [address] [place] [address] [address]  Phone: [phone] Fax: [phone]      Would the patient rather a call back or a response via [facility]? Call back   Best Call Back Number:  [phone]   Additional Information: Would like to have something called in for UTI

## 2022-03-16 ENCOUNTER — PATIENT MESSAGE (OUTPATIENT)
Dept: PRIMARY CARE CLINIC | Facility: CLINIC | Age: 77
End: 2022-03-16
Payer: MEDICARE

## 2022-06-27 ENCOUNTER — TELEPHONE (OUTPATIENT)
Dept: INTERNAL MEDICINE | Facility: CLINIC | Age: 77
End: 2022-06-27
Payer: MEDICARE

## 2022-06-27 ENCOUNTER — OFFICE VISIT (OUTPATIENT)
Dept: PRIMARY CARE CLINIC | Facility: CLINIC | Age: 77
End: 2022-06-27
Payer: MEDICARE

## 2022-06-27 VITALS
HEIGHT: 64 IN | SYSTOLIC BLOOD PRESSURE: 130 MMHG | BODY MASS INDEX: 25.14 KG/M2 | WEIGHT: 147.25 LBS | HEART RATE: 74 BPM | DIASTOLIC BLOOD PRESSURE: 72 MMHG

## 2022-06-27 DIAGNOSIS — I11.9 BENIGN HYPERTENSIVE HEART DISEASE WITHOUT CONGESTIVE HEART FAILURE: Primary | ICD-10-CM

## 2022-06-27 DIAGNOSIS — R00.0 RAPID OR IRREGULAR HEARTBEAT: ICD-10-CM

## 2022-06-27 PROCEDURE — 99214 OFFICE O/P EST MOD 30 MIN: CPT | Mod: S$GLB,,, | Performed by: PHYSICIAN ASSISTANT

## 2022-06-27 PROCEDURE — 1126F PR PAIN SEVERITY QUANTIFIED, NO PAIN PRESENT: ICD-10-PCS | Mod: CPTII,S$GLB,, | Performed by: PHYSICIAN ASSISTANT

## 2022-06-27 PROCEDURE — 1126F AMNT PAIN NOTED NONE PRSNT: CPT | Mod: CPTII,S$GLB,, | Performed by: PHYSICIAN ASSISTANT

## 2022-06-27 PROCEDURE — 1159F MED LIST DOCD IN RCRD: CPT | Mod: CPTII,S$GLB,, | Performed by: PHYSICIAN ASSISTANT

## 2022-06-27 PROCEDURE — 1159F PR MEDICATION LIST DOCUMENTED IN MEDICAL RECORD: ICD-10-PCS | Mod: CPTII,S$GLB,, | Performed by: PHYSICIAN ASSISTANT

## 2022-06-27 PROCEDURE — 99999 PR PBB SHADOW E&M-EST. PATIENT-LVL IV: ICD-10-PCS | Mod: PBBFAC,,, | Performed by: PHYSICIAN ASSISTANT

## 2022-06-27 PROCEDURE — 99214 PR OFFICE/OUTPT VISIT, EST, LEVL IV, 30-39 MIN: ICD-10-PCS | Mod: S$GLB,,, | Performed by: PHYSICIAN ASSISTANT

## 2022-06-27 PROCEDURE — 3288F PR FALLS RISK ASSESSMENT DOCUMENTED: ICD-10-PCS | Mod: CPTII,S$GLB,, | Performed by: PHYSICIAN ASSISTANT

## 2022-06-27 PROCEDURE — 3075F SYST BP GE 130 - 139MM HG: CPT | Mod: CPTII,S$GLB,, | Performed by: PHYSICIAN ASSISTANT

## 2022-06-27 PROCEDURE — 3288F FALL RISK ASSESSMENT DOCD: CPT | Mod: CPTII,S$GLB,, | Performed by: PHYSICIAN ASSISTANT

## 2022-06-27 PROCEDURE — 3078F DIAST BP <80 MM HG: CPT | Mod: CPTII,S$GLB,, | Performed by: PHYSICIAN ASSISTANT

## 2022-06-27 PROCEDURE — 3078F PR MOST RECENT DIASTOLIC BLOOD PRESSURE < 80 MM HG: ICD-10-PCS | Mod: CPTII,S$GLB,, | Performed by: PHYSICIAN ASSISTANT

## 2022-06-27 PROCEDURE — 1160F RVW MEDS BY RX/DR IN RCRD: CPT | Mod: CPTII,S$GLB,, | Performed by: PHYSICIAN ASSISTANT

## 2022-06-27 PROCEDURE — 1101F PR PT FALLS ASSESS DOC 0-1 FALLS W/OUT INJ PAST YR: ICD-10-PCS | Mod: CPTII,S$GLB,, | Performed by: PHYSICIAN ASSISTANT

## 2022-06-27 PROCEDURE — 1160F PR REVIEW ALL MEDS BY PRESCRIBER/CLIN PHARMACIST DOCUMENTED: ICD-10-PCS | Mod: CPTII,S$GLB,, | Performed by: PHYSICIAN ASSISTANT

## 2022-06-27 PROCEDURE — 3075F PR MOST RECENT SYSTOLIC BLOOD PRESS GE 130-139MM HG: ICD-10-PCS | Mod: CPTII,S$GLB,, | Performed by: PHYSICIAN ASSISTANT

## 2022-06-27 PROCEDURE — 99999 PR PBB SHADOW E&M-EST. PATIENT-LVL IV: CPT | Mod: PBBFAC,,, | Performed by: PHYSICIAN ASSISTANT

## 2022-06-27 PROCEDURE — 1101F PT FALLS ASSESS-DOCD LE1/YR: CPT | Mod: CPTII,S$GLB,, | Performed by: PHYSICIAN ASSISTANT

## 2022-06-27 NOTE — PROGRESS NOTES
Subjective:      Patient ID: Iliana Mariano is a 76 y.o. female.    Chief Complaint: Blood Pressure Check    Iliana Mariano is a 76 y.o. female who presents to clinic for bp    BP was high this morning    Takes Triamterine-hctz and metoprolol 25 mg XL for bp.  Has brought in bp log.  Worried about occasional high pulse.  Drinks water but prob not enough.  Stays very active.  Has had palpitations in the past and fearful of occurring again but denies any recent palpitations.       Past Medical History:  No date: Gastritis  No date: GERD (gastroesophageal reflux disease)  5-14-13: Glaucoma suspect with open angle  No date: Hemorrhoid  No date: Hiatal hernia  6/7/2013: Hypertension  2011: Osteoarthritis  No date: Osteoporosis, unspecified  No date: Other and unspecified hyperlipidemia  No date: Unspecified essential hypertension  No date: Unspecified hypothyroidism  No date: Unspecified vitamin D deficiency    Past Surgical History:  02/10/2017: COLONOSCOPY; N/A      Comment:  Procedure: COLONOSCOPY;  Surgeon: Alonzo Waterman MD;  Location: [facility];  Service: Endoscopy;                 Laterality: N/A;  No date: HYSTERECTOMY  No date: OOPHORECTOMY  06/06/2013: SLT OD  08/15/2013: SLT OS        Review of Systems   Constitutional: Negative for activity change, appetite change, fatigue, fever and unexpected weight change.   HENT: Negative for congestion, ear pain, sore throat and trouble swallowing.    Respiratory: Negative for cough and shortness of breath.    Cardiovascular: Negative for chest pain and palpitations.   Gastrointestinal: Negative for abdominal distention, abdominal pain, constipation, diarrhea, nausea and vomiting.   Genitourinary: Negative for difficulty urinating, frequency and urgency.   Musculoskeletal: Negative for arthralgias and myalgias.   Neurological: Positive for light-headedness (occasionally with lots of physical activity or when first get up in the monring ).  "Negative for dizziness and weakness.   Psychiatric/Behavioral: Negative for decreased concentration and dysphoric mood. The patient is not nervous/anxious.        Objective:   /72   Pulse 74   Ht 5' 4" (1.626 m)   Wt 66.8 kg (147 lb 4.3 oz)   LMP  (LMP Unknown)   BMI 25.28 kg/m²   Physical Exam  Vitals reviewed.   Constitutional:       General: She is not in acute distress.     Appearance: She is well-developed. She is not ill-appearing, toxic-appearing or diaphoretic.   HENT:      Head: Normocephalic and atraumatic.      Right Ear: External ear normal.      Left Ear: External ear normal.      Nose: Nose normal.   Cardiovascular:      Rate and Rhythm: Normal rate and regular rhythm.      Pulses:           Radial pulses are 2+ on the right side and 2+ on the left side.      Heart sounds: Normal heart sounds, S1 normal and S2 normal.   Pulmonary:      Effort: Pulmonary effort is normal. No tachypnea, bradypnea, accessory muscle usage or respiratory distress.      Breath sounds: Normal breath sounds. No decreased breath sounds, wheezing, rhonchi or rales.   Chest:      Chest wall: No tenderness.   Skin:     General: Skin is warm and dry.      Capillary Refill: Capillary refill takes less than 2 seconds.   Neurological:      Mental Status: She is alert and oriented to person, place, and time. She is not disoriented.      Cranial Nerves: No cranial nerve deficit.      Sensory: No sensory deficit.      Motor: No abnormal muscle tone.   Psychiatric:         Mood and Affect: Mood normal.         Behavior: Behavior normal.         Thought Content: Thought content normal.       Assessment:      1. Benign hypertensive heart disease without congestive heart failure    2. Rapid or irregular heartbeat           Plan:   Benign hypertensive heart disease without congestive heart failure    Rapid or irregular heartbeat      Overall bp readings in desired range  Discussed occasionally mild elevations in HR - discussed ok " to take an extra 1/2 tablet of metoprolol when occurs - suspect mild dehydration may be contributing as well - patient agrees to increase fluids, patient's HR RRR on exam today   Notify/follow-up for any dizziness/lightheadedness       Breonna Masters PA-C   Physician Assistant   [facility]

## 2022-07-07 NOTE — TELEPHONE ENCOUNTER
No new care gaps identified.  Powered by [employer]. Reference number: [id number].   1/31/2022 4:23:03 PM CST   Physical Therapy  Visit Type: initial evaluation  Co-treat with: Occupational therapist (hx of violent behaviors, very poor activity tolerance, pt yelling out/cursing)  Precautions:  Medical precautions:  fall risk;.   Lines:       Lines in chart and on patient reviewed, precautions maintained throughout session.  Safety Measures: bed alarm and chair alarm    SUBJECTIVE  Patient agreed to participate in therapy this date.  Pt agreeable to PT treatment.  Patient / Family Goal: maximize function and return home     OBJECTIVE   Level of consciousness: alert    Oriented to person, place, time and situation     Arousal alertness: appropriate responses to stimuli    Affect/Behavior: alert, pleasant and cooperative  Patient activity tolerance: 1 to 1 activity to rest    Bed Mobility:    Rolling left: supervision    Rolling right: supervision      Supine to sit: supervision    Sit to supine: supervision  Training completed:    Tasks: all aspects of bed mobility    Education details: body mechanics, patient safety and patient requires additional training  Transfers:    Assistive devices: gait belt    Sit to stand: supervision    Stand to sit: supervision    Stand pivot: supervision  Training completed:    Tasks: sit to stand, stand to sit and stand pivot    Education details: body mechanics, patient safety and patient requires additional training  Gait/Ambulation:     Assistance: supervision   Assistive device: gait belt    Distance (ft): 50    Type: decreased lashonda and unsteady  Training Completed:    Tasks: gait training on level surfaces and door management    Education details: patient safety, body mechanics and patient requires additional training      Interventions     Training provided: activity tolerance, balance retraining, bed mobility training, behavioral modification, body mechanics, caregiver training, functional ambulation, energy conservation, compensatory techniques, gait training, positioning, transfer  training and safety training    Skilled input: Verbal instruction/cues, tactile instruction/cues, posture correction and facilitation  Verbal Consent: Writer verbally educated and received verbal consent for hand placement, positioning of patient, and techniques to be performed today from patient for clothing adjustments for techniques, hand placement and palpation for techniques and therapist position for techniques as described above and how they are pertinent to the patient's plan of care.       ASSESSMENT   Impairments: strength, balance deficits, activity tolerance and endurance  Functional Limitations: all functional mobility    Patient seen in room 618/01 at [facility] 6TH Floor Inpatient Unit [facility] Surgical and presents below baseline which was the Independent level for overall mobility. Currently lives Alone in an Apartment .   For safe return to this environment, the patient needs to be Independent level for overall mobility. Pt came to the ED due to Abdominal Pain and Chest Pain Adult and was subsequently admitted for Generalized abdominal pain.  14 day re-assessment date : 7/21/2022.    14 day re-assessment required this date: No    Physical Therapy evaluation session today focused on bed mobility, transfers/ambulation without an AD and coordination of care with RN staff. Pt moving at the supervision level for overall mobility without an AD limited by c/o pain. Recommended Ax1 for mobility on the unit - updated white board in room and notified RN. Anticipate pt will be able to return to their home environment with home PT when medically cleared. Pt is agreeable to this plan.              Discharge Recommendations  Recommendation for Discharge Location: PT WI: Home with Home therapy             PT/OT Mobility Equipment for Discharge: none anticipated      PT Identified Barriers to Discharge: weakness     Patient at End of Session:   Location: in bed  Safety measures: call light within reach, bed rails x2 and  alarm system in place/re-engaged  Handoff to: nurse    Progress: progressing toward goals    • Skilled therapy is required to address these limitations in attempt to maximize the patient's independence.     • Predicted patient presentation: Low (stable) - Patient comorbidities and complexities, as defined above, will have little effect on progress for prescribed plan of care.    Education Provided:   Learning Assessment:  - Primary learner: patient  - Are they ready to learn: yes  - Preferred learning style: verbal and demonstration  - Barriers to learning: no barriers apparent at this time  Education provided during session:  - Receiving Education: patient  - Results of above outlined education: Needs reinforcement    PLAN   Suggestions for next session as indicated: full bed mobility, progress transfers/ambulation with the ww or without an AD          Frequency Comments: 7/7 M-F(HT)DS  Interventions: balance, body mechanics, compensatory technique education, energy conservation, neuromuscular re-education, ROM, strengthening, bed mobility, HEP train/position, safety education, patient/family training, community reintegration, endurance training, functional transfer training, modalities and gait training  Agreement to plan and goals: patient agrees with goals and treatment plan        GOALS  Review Date: 7/21/2022  Long Term Goals: (to be met by time of discharge from hospital)  Sit to supine: Patient will complete sit to supine independent.  Supine to sit: Patient will complete supine to sit independent.  Sit to stand: Patient will complete sit to stand transfer with no device, independent.   Ambulation (even): Patient will ambulate on even surface for 150 feet with no device, independent.     Documented in the chart in the following areas: Prior Level of Function. Assessment. Plan. Patient Education.      Therapy procedure time and total treatment time can be found documented on the Time Entry flowsheet

## 2022-07-20 DIAGNOSIS — E78.5 DYSLIPIDEMIA: ICD-10-CM

## 2022-07-20 RX ORDER — PRAVASTATIN SODIUM 40 MG/1
TABLET ORAL
Qty: 90 TABLET | Refills: 0 | Status: SHIPPED | OUTPATIENT
Start: 2022-07-20 | End: 2023-03-27

## 2022-07-20 NOTE — TELEPHONE ENCOUNTER
Refill Decision Note   Iliana Mariano  is requesting a refill authorization.  Brief Assessment and Rationale for Refill:  Approve     Medication Therapy Plan:       Medication Reconciliation Completed: No   Comments:     Provider Staff:     Action is required for this patient.   Please see care gap opportunities below in Care Due Message.     Thanks!  [facility]     Appointments      Date Provider   Last Visit   3/15/2022 Breonna Borja MD   Next Visit   Visit date not found Breonna Borja MD     Note composed:6:42 PM 07/20/2022           Note composed:6:41 PM 07/20/2022

## 2022-07-20 NOTE — TELEPHONE ENCOUNTER
Care Due:                  Date            Visit Type   Department     Provider  --------------------------------------------------------------------------------                                ESTABLISHED                              PATIENT -    [facility] PRIMARY  Last Visit: 03-      [facility]      CARE           Breonna Borja  Next Visit: None Scheduled  None         None Found                                                            Last  Test          Frequency    Reason                     Performed    Due Date  --------------------------------------------------------------------------------    CMP.........  12 months..  pravastatin,               08- 08-                             triamterene-hydrochloroth                             iazide...................    Lipid Panel.  12 months..  pravastatin..............  09- 09-    [facility] Embedded Care Gaps. Reference number: [id number]. 7/20/2022   9:43:12 AM CDT

## 2022-08-09 ENCOUNTER — OFFICE VISIT (OUTPATIENT)
Dept: INTERNAL MEDICINE | Facility: CLINIC | Age: 77
End: 2022-08-09
Payer: MEDICARE

## 2022-08-09 VITALS
SYSTOLIC BLOOD PRESSURE: 126 MMHG | BODY MASS INDEX: 25.67 KG/M2 | WEIGHT: 150.38 LBS | TEMPERATURE: 98 F | HEIGHT: 64 IN | DIASTOLIC BLOOD PRESSURE: 60 MMHG | HEART RATE: 62 BPM

## 2022-08-09 DIAGNOSIS — B37.2 CANDIDAL INTERTRIGO: ICD-10-CM

## 2022-08-09 DIAGNOSIS — L08.9 INFECTED SEBACEOUS CYST: Primary | ICD-10-CM

## 2022-08-09 DIAGNOSIS — E03.4 HYPOTHYROIDISM DUE TO ACQUIRED ATROPHY OF THYROID: ICD-10-CM

## 2022-08-09 DIAGNOSIS — I49.9 SUPRAVENTRICULAR DYSRHYTHMIA: ICD-10-CM

## 2022-08-09 DIAGNOSIS — L72.3 INFECTED SEBACEOUS CYST: Primary | ICD-10-CM

## 2022-08-09 PROCEDURE — 3288F FALL RISK ASSESSMENT DOCD: CPT | Mod: CPTII,S$GLB,, | Performed by: NURSE PRACTITIONER

## 2022-08-09 PROCEDURE — 3078F PR MOST RECENT DIASTOLIC BLOOD PRESSURE < 80 MM HG: ICD-10-PCS | Mod: CPTII,S$GLB,, | Performed by: NURSE PRACTITIONER

## 2022-08-09 PROCEDURE — 99999 PR PBB SHADOW E&M-EST. PATIENT-LVL IV: ICD-10-PCS | Mod: PBBFAC,,, | Performed by: NURSE PRACTITIONER

## 2022-08-09 PROCEDURE — 99999 PR PBB SHADOW E&M-EST. PATIENT-LVL IV: CPT | Mod: PBBFAC,,, | Performed by: NURSE PRACTITIONER

## 2022-08-09 PROCEDURE — 99214 PR OFFICE/OUTPT VISIT, EST, LEVL IV, 30-39 MIN: ICD-10-PCS | Mod: S$GLB,,, | Performed by: NURSE PRACTITIONER

## 2022-08-09 PROCEDURE — 1101F PT FALLS ASSESS-DOCD LE1/YR: CPT | Mod: CPTII,S$GLB,, | Performed by: NURSE PRACTITIONER

## 2022-08-09 PROCEDURE — 1101F PR PT FALLS ASSESS DOC 0-1 FALLS W/OUT INJ PAST YR: ICD-10-PCS | Mod: CPTII,S$GLB,, | Performed by: NURSE PRACTITIONER

## 2022-08-09 PROCEDURE — 3074F SYST BP LT 130 MM HG: CPT | Mod: CPTII,S$GLB,, | Performed by: NURSE PRACTITIONER

## 2022-08-09 PROCEDURE — 1159F PR MEDICATION LIST DOCUMENTED IN MEDICAL RECORD: ICD-10-PCS | Mod: CPTII,S$GLB,, | Performed by: NURSE PRACTITIONER

## 2022-08-09 PROCEDURE — 1125F PR PAIN SEVERITY QUANTIFIED, PAIN PRESENT: ICD-10-PCS | Mod: CPTII,S$GLB,, | Performed by: NURSE PRACTITIONER

## 2022-08-09 PROCEDURE — 99214 OFFICE O/P EST MOD 30 MIN: CPT | Mod: S$GLB,,, | Performed by: NURSE PRACTITIONER

## 2022-08-09 PROCEDURE — 3288F PR FALLS RISK ASSESSMENT DOCUMENTED: ICD-10-PCS | Mod: CPTII,S$GLB,, | Performed by: NURSE PRACTITIONER

## 2022-08-09 PROCEDURE — 3074F PR MOST RECENT SYSTOLIC BLOOD PRESSURE < 130 MM HG: ICD-10-PCS | Mod: CPTII,S$GLB,, | Performed by: NURSE PRACTITIONER

## 2022-08-09 PROCEDURE — 1159F MED LIST DOCD IN RCRD: CPT | Mod: CPTII,S$GLB,, | Performed by: NURSE PRACTITIONER

## 2022-08-09 PROCEDURE — 1125F AMNT PAIN NOTED PAIN PRSNT: CPT | Mod: CPTII,S$GLB,, | Performed by: NURSE PRACTITIONER

## 2022-08-09 PROCEDURE — 3078F DIAST BP <80 MM HG: CPT | Mod: CPTII,S$GLB,, | Performed by: NURSE PRACTITIONER

## 2022-08-09 RX ORDER — MUPIROCIN 20 MG/G
OINTMENT TOPICAL 3 TIMES DAILY
Qty: 30 G | Refills: 0 | Status: SHIPPED | OUTPATIENT
Start: 2022-08-09 | End: 2022-08-19

## 2022-08-09 RX ORDER — CLINDAMYCIN HYDROCHLORIDE 300 MG/1
300 CAPSULE ORAL 3 TIMES DAILY
Qty: 30 CAPSULE | Refills: 0 | Status: SHIPPED | OUTPATIENT
Start: 2022-08-09 | End: 2022-08-19

## 2022-08-09 RX ORDER — KETOCONAZOLE 20 MG/G
CREAM TOPICAL DAILY
Qty: 30 G | Refills: 0 | Status: SHIPPED | OUTPATIENT
Start: 2022-08-09 | End: 2023-03-28

## 2022-08-09 NOTE — PROGRESS NOTES
"Subjective:       Patient ID: Iliana Mariano is a 76 y.o. female.    Chief Complaint: Rash    Patient here for a cyst to left inner breast that has become red, swollen and tender  Cyst has been there for many years but redness is new, the last few days    She has a rash under breasts, Patient has large breasts and she is wanting an rx for this    Rash  Pertinent negatives include no cough, fatigue, fever or shortness of breath.       /60   Pulse 62   Temp 98.2 °F (36.8 °C)   Ht 5' 4" (1.626 m)   Wt 68.2 kg (150 lb 5.7 oz)   LMP  (LMP Unknown)   BMI 25.81 kg/m²     Review of Systems   Constitutional: Negative for activity change, appetite change, chills, diaphoresis, fatigue, fever and unexpected weight change.   HENT: Negative.    Respiratory: Negative for cough and shortness of breath.    Cardiovascular: Negative for chest pain, palpitations and leg swelling.   Gastrointestinal: Negative.    Genitourinary: Negative.    Musculoskeletal: Negative.    Skin: Positive for rash. Negative for color change, pallor and wound.   Allergic/Immunologic: Negative for immunocompromised state.   Neurological: Negative.  Negative for dizziness and facial asymmetry.   Hematological: Negative for adenopathy. Does not bruise/bleed easily.   Psychiatric/Behavioral: Negative for agitation, behavioral problems and confusion.       Objective:      Physical Exam  Constitutional:       General: She is not in acute distress.     Appearance: She is well-developed. She is not diaphoretic.   HENT:      Head: Normocephalic and atraumatic.      Right Ear: External ear normal.      Left Ear: External ear normal.   Eyes:      General: No scleral icterus.        Right eye: No discharge.         Left eye: No discharge.      Conjunctiva/sclera: Conjunctivae normal.   Pulmonary:      Effort: Pulmonary effort is normal. No tachypnea, accessory muscle usage or respiratory distress.      Breath sounds: No stridor.   Chest:          Comments: " Round cyst with redness, swelling, warmth and surrounding erythema    There is redness below bilateral breasts  Musculoskeletal:      Cervical back: Normal range of motion and neck supple.   Skin:     Findings: No rash.   Neurological:      Mental Status: She is alert. She is not disoriented.   Psychiatric:         Attention and Perception: She is attentive.         Mood and Affect: Mood is not anxious or depressed. Affect is not labile, blunt, angry or inappropriate.         Speech: Speech normal.         Behavior: Behavior normal.         Thought Content: Thought content normal.         Judgment: Judgment normal.         Assessment:       1. Infected sebaceous cyst    2. Candidal intertrigo    3. Supraventricular dysrhythmia    4. Hypothyroidism due to acquired atrophy of thyroid        Plan:       Iliana was seen today for rash.    Diagnoses and all orders for this visit:    Infected sebaceous cyst  -     clindamycin (CLEOCIN) 300 MG capsule; Take 1 capsule (300 mg total) by mouth 3 (three) times daily. for 10 days  -     mupirocin (BACTROBAN) 2 % ointment; Apply topically 3 (three) times daily. For infected cyst for 10 days  -     Ambulatory referral/consult to Dermatology; Future  Warm compresses  meds as above  Follow up with derm in 2 weeks to discuss possible excision    Candidal intertrigo  -     ketoconazole (NIZORAL) 2 % cream; Apply topically once daily. For under breasts  Also add zeasorb af powder    Supraventricular dysrhythmia  Stable on metoprolol. Followed by Dr. Diehl    Hypothyroidism due to acquired atrophy of thyroid  Stable on supplementation  Lab Results   Component Value Date    TSH 1.391 08/13/2021

## 2022-08-30 ENCOUNTER — OFFICE VISIT (OUTPATIENT)
Dept: DERMATOLOGY | Facility: CLINIC | Age: 77
End: 2022-08-30
Payer: MEDICARE

## 2022-08-30 DIAGNOSIS — L72.0 RUPTURED EPIDERMAL CYST: Primary | ICD-10-CM

## 2022-08-30 DIAGNOSIS — L72.3 INFECTED SEBACEOUS CYST: ICD-10-CM

## 2022-08-30 DIAGNOSIS — L08.9 INFECTED SEBACEOUS CYST: ICD-10-CM

## 2022-08-30 PROCEDURE — 3288F PR FALLS RISK ASSESSMENT DOCUMENTED: ICD-10-PCS | Mod: CPTII,S$GLB,, | Performed by: STUDENT IN AN ORGANIZED HEALTH CARE EDUCATION/TRAINING PROGRAM

## 2022-08-30 PROCEDURE — 1159F MED LIST DOCD IN RCRD: CPT | Mod: CPTII,S$GLB,, | Performed by: STUDENT IN AN ORGANIZED HEALTH CARE EDUCATION/TRAINING PROGRAM

## 2022-08-30 PROCEDURE — 1159F PR MEDICATION LIST DOCUMENTED IN MEDICAL RECORD: ICD-10-PCS | Mod: CPTII,S$GLB,, | Performed by: STUDENT IN AN ORGANIZED HEALTH CARE EDUCATION/TRAINING PROGRAM

## 2022-08-30 PROCEDURE — 99204 PR OFFICE/OUTPT VISIT, NEW, LEVL IV, 45-59 MIN: ICD-10-PCS | Mod: S$GLB,,, | Performed by: STUDENT IN AN ORGANIZED HEALTH CARE EDUCATION/TRAINING PROGRAM

## 2022-08-30 PROCEDURE — 1160F PR REVIEW ALL MEDS BY PRESCRIBER/CLIN PHARMACIST DOCUMENTED: ICD-10-PCS | Mod: CPTII,S$GLB,, | Performed by: STUDENT IN AN ORGANIZED HEALTH CARE EDUCATION/TRAINING PROGRAM

## 2022-08-30 PROCEDURE — 1126F AMNT PAIN NOTED NONE PRSNT: CPT | Mod: CPTII,S$GLB,, | Performed by: STUDENT IN AN ORGANIZED HEALTH CARE EDUCATION/TRAINING PROGRAM

## 2022-08-30 PROCEDURE — 99999 PR PBB SHADOW E&M-EST. PATIENT-LVL III: CPT | Mod: PBBFAC,,, | Performed by: STUDENT IN AN ORGANIZED HEALTH CARE EDUCATION/TRAINING PROGRAM

## 2022-08-30 PROCEDURE — 1101F PT FALLS ASSESS-DOCD LE1/YR: CPT | Mod: CPTII,S$GLB,, | Performed by: STUDENT IN AN ORGANIZED HEALTH CARE EDUCATION/TRAINING PROGRAM

## 2022-08-30 PROCEDURE — 1160F RVW MEDS BY RX/DR IN RCRD: CPT | Mod: CPTII,S$GLB,, | Performed by: STUDENT IN AN ORGANIZED HEALTH CARE EDUCATION/TRAINING PROGRAM

## 2022-08-30 PROCEDURE — 99999 PR PBB SHADOW E&M-EST. PATIENT-LVL III: ICD-10-PCS | Mod: PBBFAC,,, | Performed by: STUDENT IN AN ORGANIZED HEALTH CARE EDUCATION/TRAINING PROGRAM

## 2022-08-30 PROCEDURE — 1101F PR PT FALLS ASSESS DOC 0-1 FALLS W/OUT INJ PAST YR: ICD-10-PCS | Mod: CPTII,S$GLB,, | Performed by: STUDENT IN AN ORGANIZED HEALTH CARE EDUCATION/TRAINING PROGRAM

## 2022-08-30 PROCEDURE — 1126F PR PAIN SEVERITY QUANTIFIED, NO PAIN PRESENT: ICD-10-PCS | Mod: CPTII,S$GLB,, | Performed by: STUDENT IN AN ORGANIZED HEALTH CARE EDUCATION/TRAINING PROGRAM

## 2022-08-30 PROCEDURE — 3288F FALL RISK ASSESSMENT DOCD: CPT | Mod: CPTII,S$GLB,, | Performed by: STUDENT IN AN ORGANIZED HEALTH CARE EDUCATION/TRAINING PROGRAM

## 2022-08-30 PROCEDURE — 99204 OFFICE O/P NEW MOD 45 MIN: CPT | Mod: S$GLB,,, | Performed by: STUDENT IN AN ORGANIZED HEALTH CARE EDUCATION/TRAINING PROGRAM

## 2022-08-30 RX ORDER — DOXYCYCLINE HYCLATE 100 MG
100 TABLET ORAL 2 TIMES DAILY
Qty: 28 TABLET | Refills: 0 | Status: SHIPPED | OUTPATIENT
Start: 2022-08-30 | End: 2022-09-13

## 2022-08-30 RX ORDER — MUPIROCIN 20 MG/G
OINTMENT TOPICAL 3 TIMES DAILY
Qty: 30 G | Refills: 1 | Status: SHIPPED | OUTPATIENT
Start: 2022-08-30 | End: 2023-03-28

## 2022-08-30 NOTE — PROGRESS NOTES
Subjective:       Patient ID:  Iliana Mariano is a 76 y.o. female who presents for   Chief Complaint   Patient presents with    Cyst     Infected cyst      History of Present Illness: The patient presents with chief complaint of a draining cyst.  Location: left chest  Duration: initially was present for months to years, but started to become painful a few weeks ago  Signs/Symptoms: became red, swollen, painful and draining.   Prior treatments: started clindamycin and mupirocin along with wound care. Lesion has slightly improved.       Cyst      Review of Systems   Constitutional:  Negative for fever and chills.   Skin:  Negative for itching, rash and dry skin.      Objective:    Physical Exam   Constitutional: She appears well-developed and well-nourished. No distress.   Neurological: She is alert and oriented to person, place, and time. She is not disoriented.   Psychiatric: She has a normal mood and affect.   Skin:   Areas Examined (abnormalities noted in diagram):   Chest / Axilla Inspection Performed            Diagram Legend     Erythematous scaling macule/papule c/w actinic keratosis       Vascular papule c/w angioma      Pigmented verrucoid papule/plaque c/w seborrheic keratosis      Yellow umbilicated papule c/w sebaceous hyperplasia      Irregularly shaped tan macule c/w lentigo     1-2 mm smooth white papules consistent with Milia      Movable subcutaneous cyst with punctum c/w epidermal inclusion cyst      Subcutaneous movable cyst c/w pilar cyst      Firm pink to brown papule c/w dermatofibroma      Pedunculated fleshy papule(s) c/w skin tag(s)      Evenly pigmented macule c/w junctional nevus     Mildly variegated pigmented, slightly irregular-bordered macule c/w mildly atypical nevus      Flesh colored to evenly pigmented papule c/w intradermal nevus       Pink pearly papule/plaque c/w basal cell carcinoma      Erythematous hyperkeratotic cursted plaque c/w SCC      Surgical scar with no sign of  skin cancer recurrence      Open and closed comedones      Inflammatory papules and pustules      Verrucoid papule consistent consistent with wart     Erythematous eczematous patches and plaques     Dystrophic onycholytic nail with subungual debris c/w onychomycosis     Umbilicated papule    Erythematous-base heme-crusted tan verrucoid plaque consistent with inflamed seborrheic keratosis     Erythematous Silvery Scaling Plaque c/w Psoriasis     See annotation      Assessment / Plan:        Ruptured epidermal cyst - left chest, areas with drainage, and still with erythema. Lesion likely ruptured. Discussed excision vs observation (given a greater likelihood of recurrence due to rupture). For now, will send for doxycycline and continue mupirocin and wound care. Low threshold for surgical excision if needed.   -     doxycycline (VIBRA-TABS) 100 MG tablet; Take 1 tablet (100 mg total) by mouth 2 (two) times daily. for 14 days  Dispense: 28 tablet; Refill: 0  -     mupirocin (BACTROBAN) 2 % ointment; Apply topically 3 (three) times daily.  Dispense: 30 g; Refill: 1           Follow up in about 4 weeks (around 9/27/2022).

## 2022-09-30 ENCOUNTER — TELEPHONE (OUTPATIENT)
Dept: ADMINISTRATIVE | Facility: HOSPITAL | Age: 77
End: 2022-09-30
Payer: MEDICARE

## 2022-10-24 ENCOUNTER — TELEPHONE (OUTPATIENT)
Dept: PRIMARY CARE CLINIC | Facility: CLINIC | Age: 77
End: 2022-10-24
Payer: MEDICARE

## 2022-10-24 NOTE — TELEPHONE ENCOUNTER
----- Message from Mariaelena Cool sent at 10/24/2022  3:45 PM CDT -----  Type:  Sooner Apoointment Request    Caller is requesting a sooner appointment.  Caller declined first available appointment listed below.  Caller will not accept being placed on the waitlist and is requesting a message be sent to doctor.  Name of Caller:patient  When is the first available appointment?nurse  Symptoms: work in appt   Would the patient rather a call back or a response via [facility]? Call back  Best Call Back Number:[phone]  Additional Information: please call this number, home line

## 2022-10-24 NOTE — TELEPHONE ENCOUNTER
----- Message from John Miller sent at 10/24/2022  9:42 AM CDT -----  Contact: Pt  Type:  Sooner Apoointment Request    Caller is requesting a sooner appointment.  Caller declined first available appointment listed below.  Caller will not accept being placed on the waitlist and is requesting a message be sent to doctor.  Name of Caller: pt   When is the first available appointment?11/2022  Symptoms: chronic diarrhea/ almost 1 week/ SOB/ lightheaded/last happened on 10/21 for SOB/lightheaded lst time on 10/20  Would the patient rather a call back or a response via [facility]? phone  Best Call Back Number:[phone]  Additional Information:

## 2022-11-08 ENCOUNTER — IMMUNIZATION (OUTPATIENT)
Dept: INTERNAL MEDICINE | Facility: CLINIC | Age: 77
End: 2022-11-08
Payer: MEDICARE

## 2022-11-08 ENCOUNTER — HOSPITAL ENCOUNTER (OUTPATIENT)
Dept: RADIOLOGY | Facility: HOSPITAL | Age: 77
Discharge: HOME | End: 2022-11-08
Attending: FAMILY MEDICINE
Payer: MEDICARE

## 2022-11-08 DIAGNOSIS — Z12.31 ENCOUNTER FOR SCREENING MAMMOGRAM FOR MALIGNANT NEOPLASM OF BREAST: ICD-10-CM

## 2022-11-08 PROCEDURE — 77063 MAMMO DIGITAL SCREENING BILAT WITH TOMO: ICD-10-PCS | Mod: 26,,, | Performed by: RADIOLOGY

## 2022-11-08 PROCEDURE — 77063 BREAST TOMOSYNTHESIS BI: CPT | Mod: TC

## 2022-11-08 PROCEDURE — 77063 BREAST TOMOSYNTHESIS BI: CPT | Mod: 26,,, | Performed by: RADIOLOGY

## 2022-11-08 PROCEDURE — G0008 ADMIN INFLUENZA VIRUS VAC: HCPCS | Mod: S$GLB,,, | Performed by: FAMILY MEDICINE

## 2022-11-08 PROCEDURE — 77067 MAMMO DIGITAL SCREENING BILAT WITH TOMO: ICD-10-PCS | Mod: 26,,, | Performed by: RADIOLOGY

## 2022-11-08 PROCEDURE — 77067 SCR MAMMO BI INCL CAD: CPT | Mod: 26,,, | Performed by: RADIOLOGY

## 2022-11-08 PROCEDURE — 90694 VACC AIIV4 NO PRSRV 0.5ML IM: CPT | Mod: S$GLB,,, | Performed by: FAMILY MEDICINE

## 2022-11-08 PROCEDURE — G0008 FLU VACCINE - QUADRIVALENT - ADJUVANTED: ICD-10-PCS | Mod: S$GLB,,, | Performed by: FAMILY MEDICINE

## 2022-11-08 PROCEDURE — 90694 FLU VACCINE - QUADRIVALENT - ADJUVANTED: ICD-10-PCS | Mod: S$GLB,,, | Performed by: FAMILY MEDICINE

## 2022-11-28 ENCOUNTER — TELEPHONE (OUTPATIENT)
Dept: INTERNAL MEDICINE | Facility: CLINIC | Age: 77
End: 2022-11-28
Payer: MEDICARE

## 2022-11-28 ENCOUNTER — OFFICE VISIT (OUTPATIENT)
Dept: URGENT CARE | Facility: CLINIC | Age: 77
End: 2022-11-28
Payer: MEDICARE

## 2022-11-28 VITALS
WEIGHT: 145 LBS | HEIGHT: 65 IN | RESPIRATION RATE: 16 BRPM | HEART RATE: 84 BPM | BODY MASS INDEX: 24.16 KG/M2 | SYSTOLIC BLOOD PRESSURE: 132 MMHG | OXYGEN SATURATION: 98 % | DIASTOLIC BLOOD PRESSURE: 76 MMHG | TEMPERATURE: 98 F

## 2022-11-28 DIAGNOSIS — R42 DIZZINESS: Primary | ICD-10-CM

## 2022-11-28 DIAGNOSIS — R00.2 PALPITATION: ICD-10-CM

## 2022-11-28 PROCEDURE — 3075F SYST BP GE 130 - 139MM HG: CPT | Mod: CPTII,S$GLB,, | Performed by: NURSE PRACTITIONER

## 2022-11-28 PROCEDURE — 3078F PR MOST RECENT DIASTOLIC BLOOD PRESSURE < 80 MM HG: ICD-10-PCS | Mod: CPTII,S$GLB,, | Performed by: NURSE PRACTITIONER

## 2022-11-28 PROCEDURE — 99213 OFFICE O/P EST LOW 20 MIN: CPT | Mod: S$GLB,,, | Performed by: NURSE PRACTITIONER

## 2022-11-28 PROCEDURE — 99213 PR OFFICE/OUTPT VISIT, EST, LEVL III, 20-29 MIN: ICD-10-PCS | Mod: S$GLB,,, | Performed by: NURSE PRACTITIONER

## 2022-11-28 PROCEDURE — 1160F RVW MEDS BY RX/DR IN RCRD: CPT | Mod: CPTII,S$GLB,, | Performed by: NURSE PRACTITIONER

## 2022-11-28 PROCEDURE — 1159F PR MEDICATION LIST DOCUMENTED IN MEDICAL RECORD: ICD-10-PCS | Mod: CPTII,S$GLB,, | Performed by: NURSE PRACTITIONER

## 2022-11-28 PROCEDURE — 1126F AMNT PAIN NOTED NONE PRSNT: CPT | Mod: CPTII,S$GLB,, | Performed by: NURSE PRACTITIONER

## 2022-11-28 PROCEDURE — 1160F PR REVIEW ALL MEDS BY PRESCRIBER/CLIN PHARMACIST DOCUMENTED: ICD-10-PCS | Mod: CPTII,S$GLB,, | Performed by: NURSE PRACTITIONER

## 2022-11-28 PROCEDURE — 1126F PR PAIN SEVERITY QUANTIFIED, NO PAIN PRESENT: ICD-10-PCS | Mod: CPTII,S$GLB,, | Performed by: NURSE PRACTITIONER

## 2022-11-28 PROCEDURE — 3075F PR MOST RECENT SYSTOLIC BLOOD PRESS GE 130-139MM HG: ICD-10-PCS | Mod: CPTII,S$GLB,, | Performed by: NURSE PRACTITIONER

## 2022-11-28 PROCEDURE — 1159F MED LIST DOCD IN RCRD: CPT | Mod: CPTII,S$GLB,, | Performed by: NURSE PRACTITIONER

## 2022-11-28 PROCEDURE — 3078F DIAST BP <80 MM HG: CPT | Mod: CPTII,S$GLB,, | Performed by: NURSE PRACTITIONER

## 2022-11-28 NOTE — TELEPHONE ENCOUNTER
----- Message from Tawana Acosta sent at 11/28/2022  1:04 PM CST -----  Regarding: appt  Contact: pt  Type:  Same Day Appointment Request    Caller is requesting a same day appointment.  Caller declined first available appointment listed below.    Name of Caller: pt  When is the first available appointment? 11/29  Symptoms: pulse high / dizzy  Best Call Back Number:[phone]  Additional Information: n/a

## 2022-11-28 NOTE — PROGRESS NOTES
"Subjective:       Patient ID: Iliana Mariano is a 77 y.o. female.    Vitals:  height is 5' 5" (1.651 m) and weight is 65.8 kg (145 lb). Her oral temperature is 98.1 °F (36.7 °C). Her blood pressure is 132/76 and her pulse is 84. Her respiration is 16 and oxygen saturation is 98%.     Chief Complaint: Dizziness        Ms Mariano is a 77 year old female with presents for evaluation of dizziness. Patient describes symptoms as feeling jittery and she typical feels these symptoms when heart rate is elevated. Denies associated symptoms of chest pain, shortness, fever, chills, nausea or vomiting. She has seen cardiology for similar complaints in the pass, however she has not seen cardiology in almost a year.     HENT: Negative.     Cardiovascular:  Positive for palpitations.   Respiratory: Negative.     Gastrointestinal: Negative.    Musculoskeletal: Negative.    Skin: Negative.    Neurological:  Positive for dizziness, light-headedness and loss of balance.   Psychiatric/Behavioral: Negative.       Objective:      Physical Exam   HENT:   Head: Normocephalic and atraumatic.   Ears:   Right Ear: No middle ear effusion.   Left Ear:  No middle ear effusion.   Cardiovascular: Regular rhythm.   No murmur heard.Exam reveals no gallop and no friction rub.   Pulmonary/Chest: No stridor. No respiratory distress. She has no wheezes. She has no rhonchi. She has no rales. She exhibits no tenderness.   Abdominal: She exhibits no distension and no mass. There is no abdominal tenderness. There is no rebound, no left CVA tenderness and no right CVA tenderness. No hernia.   Neurological: She displays no weakness and normal reflexes. No cranial nerve deficit or sensory deficit. Coordination and gait normal.   Nursing note and vitals reviewed.      Assessment:       1. Dizziness    2. Palpitation          Plan:         Dizziness  -     IN OFFICE EKG 12-LEAD (to Muse)  -     Orthostatic vital signs  -     Ambulatory referral/consult to " Cardiology    Palpitation  -     Ambulatory referral/consult to Cardiology       Dizziness, Adult ED   General Information   You came to the Emergency Department (ED) for dizziness. Dizziness means different things to different people. For example, you may feel light-headed or like you are about to pass out. You may have trouble walking straight and feel like you are about to fall when you are dizzy. Some people feel as though they are spinning or the world around them is spinning. This spinning feeling is known as vertigo.  Many things can cause you to feel dizzy. Some are serious things like heart problems or a stroke. Less serious things, like getting up too quickly or not drinking enough fluids, can also cause you to feel dizzy. Some medicines can also cause you to feel dizzy.  After seeing you, the doctor feels that the risk of a serious cause for your dizziness right now is low. If the doctor thinks you have vertigo, you may be given medicine to help with your symptoms.  What care is needed at home?   Call your regular doctor to let them know you were in the ED. Make a follow-up appointment if you were told to.  Avoid changing your position too quickly. Take care when moving from sitting to standing.  Sit on the edge of the bed for a few minutes before you stand up. Start to walk slowly after you stand up.  Move your legs often if you need to sit or  one position for a long time.  Be sure to drink enough fluids, even if you do not feel thirsty.  Sit or lie down right away if you feel faint or dizzy. Take extra care to protect yourself from falls. Avoid driving when dizzy. If you feel dizzy while driving, pull over right away.  If you must walk somewhere when you feel dizzy, you may need to use a cane or walker, or have another person help you so you dont fall.  When do I need to get emergency help?   Call for an ambulance right away if:  You have new weakness in your arms or legs.  You develop new  trouble speaking, swallowing, seeing, or hearing.  You have chest pain, an irregular heartbeat, or trouble breathing.  You have a seizure.  You become unable to walk or stand because of your dizziness.  When do I need to call the doctor?   Your dizziness continues for a long time or gets worse.  You have new or worsening symptoms.        Palpitations Discharge Instructions   About this topic   Sometimes you may have an awareness of your heart beating. This may feel like your heart is beating too fast, is beating hard, or seems to skip a beat. You may also feel lightheaded, dizzy or very tired. Some palpitations go away after a short time and are not serious. Other times they can be a sign of a more serious problem.  The doctors may not be able to tell if something serious is causing your palpitations the first time they see you. It is important to follow up with your doctor.

## 2022-11-29 ENCOUNTER — PATIENT MESSAGE (OUTPATIENT)
Dept: CARDIOLOGY | Facility: CLINIC | Age: 77
End: 2022-11-29
Payer: MEDICARE

## 2022-12-21 ENCOUNTER — TELEPHONE (OUTPATIENT)
Dept: ADMINISTRATIVE | Facility: HOSPITAL | Age: 77
End: 2022-12-21
Payer: MEDICARE

## 2023-01-05 DIAGNOSIS — R00.2 PALPITATION: ICD-10-CM

## 2023-01-05 DIAGNOSIS — I11.9 BENIGN HYPERTENSIVE HEART DISEASE WITHOUT CONGESTIVE HEART FAILURE: Primary | ICD-10-CM

## 2023-01-06 ENCOUNTER — OFFICE VISIT (OUTPATIENT)
Dept: CARDIOLOGY | Facility: CLINIC | Age: 78
End: 2023-01-06
Payer: MEDICARE

## 2023-01-06 ENCOUNTER — HOSPITAL ENCOUNTER (OUTPATIENT)
Dept: CARDIOLOGY | Facility: HOSPITAL | Age: 78
Discharge: HOME | End: 2023-01-06
Attending: INTERNAL MEDICINE
Payer: MEDICARE

## 2023-01-06 VITALS
BODY MASS INDEX: 23.49 KG/M2 | SYSTOLIC BLOOD PRESSURE: 126 MMHG | OXYGEN SATURATION: 97 % | WEIGHT: 141 LBS | HEART RATE: 94 BPM | DIASTOLIC BLOOD PRESSURE: 80 MMHG | HEIGHT: 65 IN

## 2023-01-06 DIAGNOSIS — R00.2 PALPITATION: Primary | ICD-10-CM

## 2023-01-06 DIAGNOSIS — R94.31 ABNORMAL EKG: ICD-10-CM

## 2023-01-06 DIAGNOSIS — R00.0 RAPID OR IRREGULAR HEARTBEAT: ICD-10-CM

## 2023-01-06 DIAGNOSIS — E78.5 DYSLIPIDEMIA: ICD-10-CM

## 2023-01-06 DIAGNOSIS — I11.9 BENIGN HYPERTENSIVE HEART DISEASE WITHOUT CONGESTIVE HEART FAILURE: ICD-10-CM

## 2023-01-06 DIAGNOSIS — R42 DIZZINESS: ICD-10-CM

## 2023-01-06 DIAGNOSIS — E03.4 HYPOTHYROIDISM DUE TO ACQUIRED ATROPHY OF THYROID: ICD-10-CM

## 2023-01-06 DIAGNOSIS — R73.09 ABNORMAL GLUCOSE: ICD-10-CM

## 2023-01-06 DIAGNOSIS — I49.9 SUPRAVENTRICULAR DYSRHYTHMIA: ICD-10-CM

## 2023-01-06 DIAGNOSIS — R00.2 PALPITATION: ICD-10-CM

## 2023-01-06 PROCEDURE — 93005 ELECTROCARDIOGRAM TRACING: CPT | Mod: HCNC

## 2023-01-06 PROCEDURE — 3079F DIAST BP 80-89 MM HG: CPT | Mod: HCNC,CPTII,S$GLB, | Performed by: INTERNAL MEDICINE

## 2023-01-06 PROCEDURE — 3288F PR FALLS RISK ASSESSMENT DOCUMENTED: ICD-10-PCS | Mod: HCNC,CPTII,S$GLB, | Performed by: INTERNAL MEDICINE

## 2023-01-06 PROCEDURE — 3074F SYST BP LT 130 MM HG: CPT | Mod: HCNC,CPTII,S$GLB, | Performed by: INTERNAL MEDICINE

## 2023-01-06 PROCEDURE — 3079F PR MOST RECENT DIASTOLIC BLOOD PRESSURE 80-89 MM HG: ICD-10-PCS | Mod: HCNC,CPTII,S$GLB, | Performed by: INTERNAL MEDICINE

## 2023-01-06 PROCEDURE — 99214 PR OFFICE/OUTPT VISIT, EST, LEVL IV, 30-39 MIN: ICD-10-PCS | Mod: HCNC,S$GLB,, | Performed by: INTERNAL MEDICINE

## 2023-01-06 PROCEDURE — 1101F PT FALLS ASSESS-DOCD LE1/YR: CPT | Mod: HCNC,CPTII,S$GLB, | Performed by: INTERNAL MEDICINE

## 2023-01-06 PROCEDURE — 1159F PR MEDICATION LIST DOCUMENTED IN MEDICAL RECORD: ICD-10-PCS | Mod: HCNC,CPTII,S$GLB, | Performed by: INTERNAL MEDICINE

## 2023-01-06 PROCEDURE — 99999 PR PBB SHADOW E&M-EST. PATIENT-LVL III: ICD-10-PCS | Mod: PBBFAC,HCNC,, | Performed by: INTERNAL MEDICINE

## 2023-01-06 PROCEDURE — 1159F MED LIST DOCD IN RCRD: CPT | Mod: HCNC,CPTII,S$GLB, | Performed by: INTERNAL MEDICINE

## 2023-01-06 PROCEDURE — 3288F FALL RISK ASSESSMENT DOCD: CPT | Mod: HCNC,CPTII,S$GLB, | Performed by: INTERNAL MEDICINE

## 2023-01-06 PROCEDURE — 99214 OFFICE O/P EST MOD 30 MIN: CPT | Mod: HCNC,S$GLB,, | Performed by: INTERNAL MEDICINE

## 2023-01-06 PROCEDURE — 1101F PR PT FALLS ASSESS DOC 0-1 FALLS W/OUT INJ PAST YR: ICD-10-PCS | Mod: HCNC,CPTII,S$GLB, | Performed by: INTERNAL MEDICINE

## 2023-01-06 PROCEDURE — 99999 PR PBB SHADOW E&M-EST. PATIENT-LVL III: CPT | Mod: PBBFAC,HCNC,, | Performed by: INTERNAL MEDICINE

## 2023-01-06 PROCEDURE — 1126F AMNT PAIN NOTED NONE PRSNT: CPT | Mod: HCNC,CPTII,S$GLB, | Performed by: INTERNAL MEDICINE

## 2023-01-06 PROCEDURE — 3074F PR MOST RECENT SYSTOLIC BLOOD PRESSURE < 130 MM HG: ICD-10-PCS | Mod: HCNC,CPTII,S$GLB, | Performed by: INTERNAL MEDICINE

## 2023-01-06 PROCEDURE — 93010 EKG 12-LEAD: ICD-10-PCS | Mod: HCNC,,, | Performed by: INTERNAL MEDICINE

## 2023-01-06 PROCEDURE — 1126F PR PAIN SEVERITY QUANTIFIED, NO PAIN PRESENT: ICD-10-PCS | Mod: HCNC,CPTII,S$GLB, | Performed by: INTERNAL MEDICINE

## 2023-01-06 PROCEDURE — 93010 ELECTROCARDIOGRAM REPORT: CPT | Mod: HCNC,,, | Performed by: INTERNAL MEDICINE

## 2023-01-06 NOTE — PROGRESS NOTES
Subjective:   Patient ID:  Iliana Mariano is a 77 y.o. female who presents for follow up of No chief complaint on file.      HPI  8/19/2021   A 76 YO FEMALE REFERRED FROM DR DWYER because of recurrent sudden episode of feeling dizziness lightheadedness feeling funny disoriented and had to sit down abort what she is doing could not feel  Her pulse felt racing irregular could not count it. Had a total of 2 episodes. Her ekg today ivcd of the lbbb type. No ischemic changes her ekg is unchanged since 2013. Has been feeling tired lately no nap during the day. She sleeps well at nite no snoring. No tia claudication no chest pain no shortness of breath has feeling of imbalance while walking no focal weakness. Has a cardiolite in 2013 was negative. she is fairly active. Carotid stent pending   Strong fh cad. She was placed on baby asa ec.     9/17/2021  Her efor f/u test results still feels tired . No further dizziness lightheadedness     12/17/2021 Tuesday after thanksgiving she got up felt funny  Lightheaded felt her heart unusual ? Fast . Had another episode last week. She monitored bp it was elevated . Her bp fluctuiated. She was tired . Her rt foot goes numb when she sits down. Has no other issues clinically     1/6/2023   Gets episodes of lightheadedness dizziness feels like she is going to pass out. Her bp fluctuates and her heart rate fluctuates with it has no syncope has no fall no chest pain.    no change in her meds. She drinks 1 cup of coffee in the morning.  Past Medical History:   Diagnosis Date    Gastritis     GERD (gastroesophageal reflux disease)     Glaucoma suspect with open angle 5-14-13    Hemorrhoid     Hiatal hernia     Hypertension 6/7/2013    Osteoarthritis 2011    Osteoporosis, unspecified     Other and unspecified hyperlipidemia     Unspecified essential hypertension     Unspecified hypothyroidism     Unspecified vitamin D deficiency        Past Surgical History:   Procedure Laterality Date     COLONOSCOPY N/A 02/10/2017    Procedure: COLONOSCOPY;  Surgeon: Alonzo Waterman MD;  Location: [facility];  Service: Endoscopy;  Laterality: N/A;    HYSTERECTOMY  1990    OOPHORECTOMY  1990    SLT OD  06/06/2013    SLT OS  08/15/2013       Social History     Tobacco Use    Smoking status: Never    Smokeless tobacco: Never   Substance Use Topics    Alcohol use: No    Drug use: No       Family History   Problem Relation Age of Onset    Glaucoma Mother     Cataracts Mother     Diabetes Mother     Hypertension Mother     Stroke Mother     Cataracts Father     Diabetes Sister     Hypertension Sister     Diabetes Brother     Diabetes Maternal Grandfather     Blindness Maternal Grandfather     Thyroid disease Paternal Grandmother     Ovarian cancer Other     Breast cancer Paternal Aunt     Strabismus Neg Hx     Retinal detachment Neg Hx     Macular degeneration Neg Hx     Amblyopia Neg Hx        Current Outpatient Medications   Medication Sig    aspirin (ECOTRIN) 81 MG EC tablet Take 81 mg by mouth once daily.    ergocalciferol, vitamin D2, 1,000 unit Cap Take 1,000 Units by mouth.    FLUAD QUAD 2021-22,65Y UP,,PF, 60 mcg (15 mcg x 4)/0.5 mL Syrg     latanoprost 0.005 % ophthalmic solution Place 1 drop into both eyes every evening.    levothyroxine (SYNTHROID) 100 MCG tablet TAKE 1 TABLET EVERY DAY    LORATADINE (CLARITIN ORAL) Take by mouth as needed.    metoprolol succinate (TOPROL-XL) 25 MG 24 hr tablet TAKE 1 TABLET ONE TIME DAILY    pantoprazole (PROTONIX) 40 MG tablet TAKE 1 TABLET EVERY DAY    pravastatin (PRAVACHOL) 40 MG tablet TAKE 1 TABLET EVERY DAY    triamterene-hydrochlorothiazide 37.5-25 mg (MAXZIDE-25) 37.5-25 mg per tablet TAKE 1 AND 1/2 TABLETS ONE TIME DAILY    ketoconazole (NIZORAL) 2 % cream Apply topically once daily. For under breasts (Patient not taking: Reported on 8/30/2022)    mupirocin (BACTROBAN) 2 % ointment Apply topically 3 (three) times daily. (Patient not taking: Reported on  11/28/2022)     No current facility-administered medications for this visit.     Current Outpatient Medications on File Prior to Visit   Medication Sig    aspirin (ECOTRIN) 81 MG EC tablet Take 81 mg by mouth once daily.    ergocalciferol, vitamin D2, 1,000 unit Cap Take 1,000 Units by mouth.    FLUAD QUAD 2021-22,65Y UP,,PF, 60 mcg (15 mcg x 4)/0.5 mL Syrg     latanoprost 0.005 % ophthalmic solution Place 1 drop into both eyes every evening.    levothyroxine (SYNTHROID) 100 MCG tablet TAKE 1 TABLET EVERY DAY    LORATADINE (CLARITIN ORAL) Take by mouth as needed.    metoprolol succinate (TOPROL-XL) 25 MG 24 hr tablet TAKE 1 TABLET ONE TIME DAILY    pantoprazole (PROTONIX) 40 MG tablet TAKE 1 TABLET EVERY DAY    pravastatin (PRAVACHOL) 40 MG tablet TAKE 1 TABLET EVERY DAY    triamterene-hydrochlorothiazide 37.5-25 mg (MAXZIDE-25) 37.5-25 mg per tablet TAKE 1 AND 1/2 TABLETS ONE TIME DAILY    ketoconazole (NIZORAL) 2 % cream Apply topically once daily. For under breasts (Patient not taking: Reported on 8/30/2022)    mupirocin (BACTROBAN) 2 % ointment Apply topically 3 (three) times daily. (Patient not taking: Reported on 11/28/2022)     No current facility-administered medications on file prior to visit.     Review of patient's allergies indicates:   Allergen Reactions    Sulfa (sulfonamide antibiotics) Hives      Review of Systems   Constitutional: Negative for diaphoresis, malaise/fatigue and weight gain.   HENT:  Negative for hoarse voice.    Eyes:  Negative for double vision and visual disturbance.   Cardiovascular:  Positive for irregular heartbeat, near-syncope and palpitations. Negative for chest pain, claudication, cyanosis, dyspnea on exertion, leg swelling, orthopnea, paroxysmal nocturnal dyspnea and syncope.   Respiratory:  Negative for cough, hemoptysis, shortness of breath and snoring.    Hematologic/Lymphatic: Negative for bleeding problem. Does not bruise/bleed easily.   Skin:  Negative for color change  and poor wound healing.   Musculoskeletal:  Negative for muscle cramps, muscle weakness and myalgias.   Gastrointestinal:  Negative for bloating, abdominal pain, change in bowel habit, diarrhea, heartburn, hematemesis, hematochezia, melena and nausea.   Neurological:  Positive for light-headedness. Negative for excessive daytime sleepiness, dizziness, headaches, loss of balance, numbness and weakness.   Psychiatric/Behavioral:  Negative for memory loss. The patient does not have insomnia.    Allergic/Immunologic: Negative for hives.     Objective:   Physical Exam  Vitals and nursing note reviewed.   Constitutional:       General: She is not in acute distress.     Appearance: Normal appearance. She is well-developed. She is not ill-appearing.   HENT:      Head: Normocephalic and atraumatic.   Eyes:      General: No scleral icterus.     Pupils: Pupils are equal, round, and reactive to light.   Neck:      Thyroid: No thyromegaly.      Vascular: Normal carotid pulses. No carotid bruit, hepatojugular reflux or JVD.      Trachea: No tracheal deviation.   Cardiovascular:      Rate and Rhythm: Normal rate and regular rhythm.      Pulses: Normal pulses.      Heart sounds: Normal heart sounds. No murmur heard.    No friction rub. No gallop.   Pulmonary:      Effort: Pulmonary effort is normal. No respiratory distress.      Breath sounds: Normal breath sounds. No wheezing, rhonchi or rales.   Chest:      Chest wall: No tenderness.   Abdominal:      General: Bowel sounds are normal. There is no abdominal bruit.      Palpations: Abdomen is soft. There is no hepatomegaly or pulsatile mass.      Tenderness: There is no abdominal tenderness.   Musculoskeletal:      Right shoulder: No deformity.      Cervical back: Normal range of motion and neck supple.      Right lower leg: No edema.      Left lower leg: No edema.   Skin:     General: Skin is warm and dry.      Findings: No erythema or rash.      Nails: There is no clubbing.  "  Neurological:      Mental Status: She is alert and oriented to person, place, and time.      Cranial Nerves: No cranial nerve deficit.      Coordination: Coordination normal.   Psychiatric:         Speech: Speech normal.         Behavior: Behavior normal.         Thought Content: Thought content normal.     Vitals:    01/06/23 1535 01/06/23 1536   BP: 130/78 126/80   BP Location: Right arm Left arm   Patient Position: Sitting Sitting   BP Method: Medium (Manual) Medium (Manual)   Pulse: 94    SpO2: 97%    Weight: 64 kg (141 lb)    Height: 5' 5" (1.651 m)      Lab Results   Component Value Date    CHOL 173 09/13/2021    CHOL 195 09/17/2020    CHOL 183 06/24/2019     Lab Results   Component Value Date    HDL 68 09/13/2021    HDL 71 09/17/2020    HDL 74 06/24/2019     Lab Results   Component Value Date    LDLCALC 89.4 09/13/2021    LDLCALC 107.6 09/17/2020    LDLCALC 94.6 06/24/2019     Lab Results   Component Value Date    TRIG 78 09/13/2021    TRIG 82 09/17/2020    TRIG 72 06/24/2019     Lab Results   Component Value Date    CHOLHDL 39.3 09/13/2021    CHOLHDL 36.4 09/17/2020    CHOLHDL 40.4 06/24/2019       Chemistry        Component Value Date/Time     08/13/2021 0807    K 3.9 08/13/2021 0807     08/13/2021 0807    CO2 28 08/13/2021 0807    BUN 13 08/13/2021 0807    CREATININE 0.9 08/13/2021 0807     08/13/2021 0807        Component Value Date/Time    CALCIUM 10.1 08/13/2021 0807    ALKPHOS 87 08/13/2021 0807    AST 19 08/13/2021 0807    ALT 17 08/13/2021 0807    BILITOT 0.6 08/13/2021 0807    ESTGFRAFRICA >60.0 08/13/2021 0807    EGFRNONAA >60.0 08/13/2021 0807          Lab Results   Component Value Date    TSH 1.391 08/13/2021     No results found for: INR, PROTIME  Lab Results   Component Value Date    WBC 8.49 08/13/2021    HGB 13.8 08/13/2021    HCT 44.6 08/13/2021    MCV 98 08/13/2021     08/13/2021     BMP  Sodium   Date Value Ref Range Status   08/13/2021 141 136 - 145 mmol/L " Final     Potassium   Date Value Ref Range Status   08/13/2021 3.9 3.5 - 5.1 mmol/L Final     Chloride   Date Value Ref Range Status   08/13/2021 102 95 - 110 mmol/L Final     CO2   Date Value Ref Range Status   08/13/2021 28 23 - 29 mmol/L Final     BUN   Date Value Ref Range Status   08/13/2021 13 8 - 23 mg/dL Final     Creatinine   Date Value Ref Range Status   08/13/2021 0.9 0.5 - 1.4 mg/dL Final     Calcium   Date Value Ref Range Status   08/13/2021 10.1 8.7 - 10.5 mg/dL Final     Anion Gap   Date Value Ref Range Status   08/13/2021 11 8 - 16 mmol/L Final     eGFR if    Date Value Ref Range Status   08/13/2021 >60.0 >60 mL/min/1.73 m^2 Final     eGFR if non    Date Value Ref Range Status   08/13/2021 >60.0 >60 mL/min/1.73 m^2 Final     Comment:     Calculation used to obtain the estimated glomerular filtration  rate (eGFR) is the CKD-EPI equation.        CrCl cannot be calculated (Patient's most recent lab result is older than the maximum 7 days allowed.).    Assessment:     1. Palpitation    2. Abnormal EKG    3. Dyslipidemia    4. Benign hypertensive heart disease without congestive heart failure    5. Rapid or irregular heartbeat    6. Supraventricular dysrhythmia    7. Abnormal glucose    8. Hypothyroidism due to acquired atrophy of thyroid    9. Dizziness    Has variability in heart rate with acceptable blood pressure by review of her bp readings. Has also near syncope at times with ehr tachycardia I would like to obtain labs and check her tsh then accordingly make decision if need to decrease diuretics and repeat a holter to see if any svt. Her ekg is unchanged except her hr is increased. I do not think she is deconditioned she stays hydrated.     Plan:   Cmp cbc tsh t4   Phone review and make decision on holter and meds adjustment.

## 2023-01-09 ENCOUNTER — TELEPHONE (OUTPATIENT)
Dept: CARDIOLOGY | Facility: CLINIC | Age: 78
End: 2023-01-09
Payer: MEDICARE

## 2023-01-09 DIAGNOSIS — R00.2 PALPITATION: Primary | ICD-10-CM

## 2023-01-09 NOTE — TELEPHONE ENCOUNTER
[facility] for patient to call back regarding results----- Message from Kathy Diehl MD sent at 1/8/2023 12:08 PM CST -----  She has normal thyroid studies . She seems on dehydrated side.   Ask her to drop her diuretics to only one pill a day instead of 1.5 pills and let her observe her hr and bp readings and she can report back in 2 weeks via my chart.   If she is still having symptoms needs a holter.

## 2023-01-09 NOTE — TELEPHONE ENCOUNTER
Patient was notified of results. All questions were answered. Pt verbalized understanding. Pt will call back with any other questions or concerns.    Awaiting order for holter to be placed and will yifan patient to schedule    ----- Message from Kathy Diehl MD sent at 1/8/2023 12:08 PM CST -----  She has normal thyroid studies . She seems on dehydrated side.   Ask her to drop her diuretics to only one pill a day instead of 1.5 pills and let her observe her hr and bp readings and she can report back in 2 weeks via my chart.   If she is still having symptoms needs a holter.

## 2023-01-10 ENCOUNTER — TELEPHONE (OUTPATIENT)
Dept: CARDIOLOGY | Facility: HOSPITAL | Age: 78
End: 2023-01-10
Payer: MEDICARE

## 2023-01-15 ENCOUNTER — PATIENT MESSAGE (OUTPATIENT)
Dept: CARDIOLOGY | Facility: CLINIC | Age: 78
End: 2023-01-15
Payer: MEDICARE

## 2023-01-20 ENCOUNTER — HOSPITAL ENCOUNTER (OUTPATIENT)
Dept: CARDIOLOGY | Facility: HOSPITAL | Age: 78
Discharge: HOME | End: 2023-01-20
Attending: INTERNAL MEDICINE
Payer: MEDICARE

## 2023-01-20 DIAGNOSIS — R00.2 PALPITATION: ICD-10-CM

## 2023-01-20 PROCEDURE — 93225 XTRNL ECG REC<48 HRS REC: CPT | Mod: HCNC,PO

## 2023-01-20 PROCEDURE — 93227 HOLTER MONITOR - 48 HOUR (CUPID ONLY): ICD-10-PCS | Mod: HCNC,,, | Performed by: INTERNAL MEDICINE

## 2023-01-20 PROCEDURE — 93227 XTRNL ECG REC<48 HR R&I: CPT | Mod: HCNC,,, | Performed by: INTERNAL MEDICINE

## 2023-02-01 LAB
OHS CV EVENT MONITOR DAY: 0
OHS CV HOLTER LENGTH DECIMAL HOURS: 48
OHS CV HOLTER LENGTH HOURS: 48
OHS CV HOLTER LENGTH MINUTES: 0
OHS CV HOLTER SINUS AVERAGE HR: 78
OHS CV HOLTER SINUS MAX HR: 150
OHS CV HOLTER SINUS MIN HR: 53

## 2023-02-07 DIAGNOSIS — Z00.00 ENCOUNTER FOR MEDICARE ANNUAL WELLNESS EXAM: ICD-10-CM

## 2023-02-09 ENCOUNTER — PATIENT MESSAGE (OUTPATIENT)
Dept: CARDIOLOGY | Facility: CLINIC | Age: 78
End: 2023-02-09
Payer: MEDICARE

## 2023-02-09 DIAGNOSIS — Z00.00 ENCOUNTER FOR MEDICARE ANNUAL WELLNESS EXAM: ICD-10-CM

## 2023-03-21 ENCOUNTER — TELEPHONE (OUTPATIENT)
Dept: ADMINISTRATIVE | Facility: HOSPITAL | Age: 78
End: 2023-03-21
Payer: MEDICARE

## 2023-03-22 PROBLEM — N18.31 STAGE 3A CHRONIC KIDNEY DISEASE: Status: ACTIVE | Noted: 2023-03-22

## 2023-03-25 DIAGNOSIS — E78.5 DYSLIPIDEMIA: ICD-10-CM

## 2023-03-25 NOTE — TELEPHONE ENCOUNTER
Care Due:                  Date            Visit Type   Department     Provider  --------------------------------------------------------------------------------                                ESTABLISHED                              PATIENT -    [facility]  Last Visit: 03-      [facility]      CARE           Breonna Borja  Next Visit: None Scheduled  None         None Found                                                            Last  Test          Frequency    Reason                     Performed    Due Date  --------------------------------------------------------------------------------    Office Visit  12 months..  pravastatin,               03-   03-                             triamterene-hydrochloroth                             iazide...................    Lipid Panel.  12 months..  pravastatin..............  09- 09-    [facility] Embedded Care Gaps. Reference number: [id number]. 3/25/2023   12:57:14 PM CDT

## 2023-03-26 NOTE — TELEPHONE ENCOUNTER
Refill Routing Note   Medication(s) are not appropriate for processing by [facility] for the following reason(s):         Requires appointment  Requires labs  Required labs outdated    [facility] action(s):  Defer           Appointments  past 12m or future 3m with PCP    Date Provider   Last Visit   3/15/2022 Breonna Borja MD   Next Visit   Visit date not found Breonna Borja MD   ED visits in past 90 days: 0        Note composed:10:57 PM 03/25/2023

## 2023-03-27 RX ORDER — PRAVASTATIN SODIUM 40 MG/1
TABLET ORAL
Qty: 90 TABLET | Refills: 0 | Status: SHIPPED | OUTPATIENT
Start: 2023-03-27 | End: 2023-08-28

## 2023-03-27 NOTE — TELEPHONE ENCOUNTER
A 90 day prescription has been sent. Please make sure to have patient make a followup appointment or keep the scheduled appointment to avoid future delays in medication refills. Thanks. Breonna Borja MD

## 2023-03-28 ENCOUNTER — TELEPHONE (OUTPATIENT)
Dept: PRIMARY CARE CLINIC | Facility: CLINIC | Age: 78
End: 2023-03-28
Payer: MEDICARE

## 2023-03-28 ENCOUNTER — OFFICE VISIT (OUTPATIENT)
Dept: INTERNAL MEDICINE | Facility: CLINIC | Age: 78
End: 2023-03-28
Payer: MEDICARE

## 2023-03-28 VITALS
DIASTOLIC BLOOD PRESSURE: 74 MMHG | SYSTOLIC BLOOD PRESSURE: 136 MMHG | WEIGHT: 145.5 LBS | HEART RATE: 78 BPM | TEMPERATURE: 98 F | HEIGHT: 65 IN | RESPIRATION RATE: 18 BRPM | OXYGEN SATURATION: 99 % | BODY MASS INDEX: 24.24 KG/M2

## 2023-03-28 DIAGNOSIS — E78.5 HYPERLIPIDEMIA, UNSPECIFIED HYPERLIPIDEMIA TYPE: ICD-10-CM

## 2023-03-28 DIAGNOSIS — E78.5 DYSLIPIDEMIA: ICD-10-CM

## 2023-03-28 DIAGNOSIS — N18.31 STAGE 3A CHRONIC KIDNEY DISEASE: ICD-10-CM

## 2023-03-28 DIAGNOSIS — M81.0 OSTEOPOROSIS, UNSPECIFIED OSTEOPOROSIS TYPE, UNSPECIFIED PATHOLOGICAL FRACTURE PRESENCE: ICD-10-CM

## 2023-03-28 DIAGNOSIS — Z78.0 POSTMENOPAUSAL: ICD-10-CM

## 2023-03-28 DIAGNOSIS — R00.2 PALPITATION: ICD-10-CM

## 2023-03-28 DIAGNOSIS — R00.0 RAPID OR IRREGULAR HEARTBEAT: ICD-10-CM

## 2023-03-28 DIAGNOSIS — R73.09 ABNORMAL GLUCOSE: ICD-10-CM

## 2023-03-28 DIAGNOSIS — H40.9 MODERATE STAGE GLAUCOMA: ICD-10-CM

## 2023-03-28 DIAGNOSIS — E55.9 VITAMIN D DEFICIENCY: ICD-10-CM

## 2023-03-28 DIAGNOSIS — Z00.00 ENCOUNTER FOR PREVENTIVE HEALTH EXAMINATION: Primary | ICD-10-CM

## 2023-03-28 DIAGNOSIS — E03.4 HYPOTHYROIDISM DUE TO ACQUIRED ATROPHY OF THYROID: ICD-10-CM

## 2023-03-28 DIAGNOSIS — I11.9 BENIGN HYPERTENSIVE HEART DISEASE WITHOUT CONGESTIVE HEART FAILURE: ICD-10-CM

## 2023-03-28 DIAGNOSIS — R79.9 ABNORMAL BLOOD CHEMISTRY LEVEL: ICD-10-CM

## 2023-03-28 DIAGNOSIS — I11.9 BENIGN HYPERTENSIVE HEART DISEASE WITHOUT HEART FAILURE: Primary | ICD-10-CM

## 2023-03-28 DIAGNOSIS — I49.9 SUPRAVENTRICULAR DYSRHYTHMIA: ICD-10-CM

## 2023-03-28 DIAGNOSIS — M25.473 ANKLE EDEMA: ICD-10-CM

## 2023-03-28 PROCEDURE — 3078F DIAST BP <80 MM HG: CPT | Mod: HCNC,CPTII,S$GLB, | Performed by: NURSE PRACTITIONER

## 2023-03-28 PROCEDURE — 3075F PR MOST RECENT SYSTOLIC BLOOD PRESS GE 130-139MM HG: ICD-10-PCS | Mod: HCNC,CPTII,S$GLB, | Performed by: NURSE PRACTITIONER

## 2023-03-28 PROCEDURE — 1160F PR REVIEW ALL MEDS BY PRESCRIBER/CLIN PHARMACIST DOCUMENTED: ICD-10-PCS | Mod: HCNC,CPTII,S$GLB, | Performed by: NURSE PRACTITIONER

## 2023-03-28 PROCEDURE — G0439 PR MEDICARE ANNUAL WELLNESS SUBSEQUENT VISIT: ICD-10-PCS | Mod: HCNC,S$GLB,, | Performed by: NURSE PRACTITIONER

## 2023-03-28 PROCEDURE — 1170F PR FUNCTIONAL STATUS ASSESSED: ICD-10-PCS | Mod: HCNC,CPTII,S$GLB, | Performed by: NURSE PRACTITIONER

## 2023-03-28 PROCEDURE — 1160F RVW MEDS BY RX/DR IN RCRD: CPT | Mod: HCNC,CPTII,S$GLB, | Performed by: NURSE PRACTITIONER

## 2023-03-28 PROCEDURE — 1159F MED LIST DOCD IN RCRD: CPT | Mod: HCNC,CPTII,S$GLB, | Performed by: NURSE PRACTITIONER

## 2023-03-28 PROCEDURE — 3075F SYST BP GE 130 - 139MM HG: CPT | Mod: HCNC,CPTII,S$GLB, | Performed by: NURSE PRACTITIONER

## 2023-03-28 PROCEDURE — 1170F FXNL STATUS ASSESSED: CPT | Mod: HCNC,CPTII,S$GLB, | Performed by: NURSE PRACTITIONER

## 2023-03-28 PROCEDURE — 1126F AMNT PAIN NOTED NONE PRSNT: CPT | Mod: HCNC,CPTII,S$GLB, | Performed by: NURSE PRACTITIONER

## 2023-03-28 PROCEDURE — 1101F PT FALLS ASSESS-DOCD LE1/YR: CPT | Mod: HCNC,CPTII,S$GLB, | Performed by: NURSE PRACTITIONER

## 2023-03-28 PROCEDURE — 3288F FALL RISK ASSESSMENT DOCD: CPT | Mod: HCNC,CPTII,S$GLB, | Performed by: NURSE PRACTITIONER

## 2023-03-28 PROCEDURE — G0439 PPPS, SUBSEQ VISIT: HCPCS | Mod: HCNC,S$GLB,, | Performed by: NURSE PRACTITIONER

## 2023-03-28 PROCEDURE — 3288F PR FALLS RISK ASSESSMENT DOCUMENTED: ICD-10-PCS | Mod: HCNC,CPTII,S$GLB, | Performed by: NURSE PRACTITIONER

## 2023-03-28 PROCEDURE — 99999 PR PBB SHADOW E&M-EST. PATIENT-LVL V: ICD-10-PCS | Mod: PBBFAC,HCNC,, | Performed by: NURSE PRACTITIONER

## 2023-03-28 PROCEDURE — 3078F PR MOST RECENT DIASTOLIC BLOOD PRESSURE < 80 MM HG: ICD-10-PCS | Mod: HCNC,CPTII,S$GLB, | Performed by: NURSE PRACTITIONER

## 2023-03-28 PROCEDURE — 1159F PR MEDICATION LIST DOCUMENTED IN MEDICAL RECORD: ICD-10-PCS | Mod: HCNC,CPTII,S$GLB, | Performed by: NURSE PRACTITIONER

## 2023-03-28 PROCEDURE — 99999 PR PBB SHADOW E&M-EST. PATIENT-LVL V: CPT | Mod: PBBFAC,HCNC,, | Performed by: NURSE PRACTITIONER

## 2023-03-28 PROCEDURE — 1101F PR PT FALLS ASSESS DOC 0-1 FALLS W/OUT INJ PAST YR: ICD-10-PCS | Mod: HCNC,CPTII,S$GLB, | Performed by: NURSE PRACTITIONER

## 2023-03-28 PROCEDURE — 1126F PR PAIN SEVERITY QUANTIFIED, NO PAIN PRESENT: ICD-10-PCS | Mod: HCNC,CPTII,S$GLB, | Performed by: NURSE PRACTITIONER

## 2023-03-28 NOTE — Clinical Note
Good morning,   Mrs. Mariano needs an annual exam with her PCP, Dr. Borja. Can your team please assist in scheduling her?   Thank you for your help,   JUDIT Mccartney

## 2023-03-28 NOTE — TELEPHONE ENCOUNTER
----- Message from Kelsey Swenson NP sent at 3/28/2023 11:03 AM CDT -----  Good morning,     Mrs. Mariano needs an annual exam with her PCP, Dr. Borja. Can your team please assist in scheduling her?     Thank you for your help,     JUDIT Mccartney

## 2023-03-28 NOTE — TELEPHONE ENCOUNTER
----- Message from Cristina Sheth sent at 3/28/2023 12:04 PM CDT -----  Type:  Patient Returning Call    Who Called:pt  Who Left Message for Patient:nurse  Does the patient know what this is regarding?:yes  Would the patient rather a call back or a response via [employer]? call  Best Call Back Number:[phone]  Additional Information:

## 2023-03-28 NOTE — PATIENT INSTRUCTIONS
Counseling and Referral of Other Preventative  (Italic type indicates deductible and co-insurance are waived)    Patient Name: Iliana Mariano  Today's Date: 3/28/2023    Health Maintenance       Date Due Completion Date    COVID-19 Vaccine (3 - Booster for Pfizer series) 07/02/2021 5/7/2021    DEXA Scan 08/05/2022 8/5/2019    Override on 12/18/2012: Done (REPEAT 2-3 YRS)    Lipid Panel 09/13/2022 9/13/2021    Mammogram 11/08/2024 11/8/2022    Override on 12/18/2012: Done    TETANUS VACCINE 09/01/2026 9/1/2016 (Done)    Override on 9/1/2016: Done    Colonoscopy 02/10/2027 2/10/2017        Orders Placed This Encounter   Procedures    DXA Bone Density Axial Skeleton 1 or more sites       The following information is provided to all patients.  This information is to help you find resources for any of the problems found today that may be affecting your health:                Living healthy guide: www.[facility].[place].[facility]      Understanding Diabetes: www.diabetes.org      Eating healthy: www.cdc.gov/healthyweight      Formerly named [facility] home safety checklist: www.cdc.gov/steadi/patient.html      Agency on Aging: www.goea.[place]      Alcoholics anonymous (AA): www.aa.org      Physical Activity: www.mauricio.nih.gov/ug4lsss      Tobacco use: www.quitwithusla.org     Counseling and Referral of Other Preventative  (Italic type indicates deductible and co-insurance are waived)    Patient Name: Iliana Mariano  Today's Date: 3/28/2023    Health Maintenance       Date Due Completion Date    COVID-19 Vaccine (3 - Booster for Pfizer series) 07/02/2021 5/7/2021    DEXA Scan 08/05/2022 8/5/2019    Override on 12/18/2012: Done (REPEAT 2-3 YRS)    Lipid Panel 09/13/2022 9/13/2021    Mammogram 11/08/2024 11/8/2022    Override on 12/18/2012: Done    TETANUS VACCINE 09/01/2026 9/1/2016 (Done)    Override on 9/1/2016: Done    Colonoscopy 02/10/2027 2/10/2017        Orders Placed This Encounter   Procedures    DXA Bone Density Axial Skeleton 1 or more  sites     The following information is provided to all patients.  This information is to help you find resources for any of the problems found today that may be affecting your health:                Living healthy guide: www.[facility].[place].[facility]      Understanding Diabetes: www.diabetes.org      Eating healthy: www.cdc.gov/healthyweight      CDC home safety checklist: www.cdc.gov/steadi/patient.html      Agency on Aging: www.goea.[place]      Alcoholics anonymous (AA): www.aa.org      Physical Activity: www.mauricio.nih.gov/qc9gvyw      Tobacco use: www.quitwithusla.org

## 2023-03-28 NOTE — PROGRESS NOTES
"  Iliana Mariano presented for a follow-up Medicare AWV today. The following components were reviewed and updated:    Medical history  Family History  Social history  Allergies and Current Medications  Health Risk Assessment  Health Maintenance  Care Team    **See Completed Assessments for Annual Wellness visit with in the encounter summary    The following assessments were completed:  Depression Screening  Cognitive function Screening  Timed Get Up Test  Whisper Test          Vitals:    03/28/23 0910   BP: 136/74   BP Location: Left arm   Patient Position: Sitting   Pulse: 78   Resp: 18   Temp: 97.5 °F (36.4 °C)   TempSrc: Oral   SpO2: 99%   Weight: 66 kg (145 lb 8.1 oz)   Height: 5' 5" (1.651 m)     Body mass index is 24.21 kg/m².   ]    Physical Exam  Constitutional:       Appearance: Normal appearance. She is normal weight.   HENT:      Head: Normocephalic and atraumatic.   Cardiovascular:      Rate and Rhythm: Normal rate and regular rhythm.      Pulses: Normal pulses.      Heart sounds: Normal heart sounds.   Pulmonary:      Effort: Pulmonary effort is normal.      Breath sounds: Normal breath sounds.   Abdominal:      General: Bowel sounds are normal.   Musculoskeletal:         General: Normal range of motion.      Right lower leg: Edema (trace ankle edema) present.      Left lower leg: Edema (trace ankle edema) present.   Skin:     General: Skin is dry.      Capillary Refill: Capillary refill takes less than 2 seconds.   Neurological:      General: No focal deficit present.      Mental Status: She is alert and oriented to person, place, and time.   Psychiatric:         Mood and Affect: Mood normal.         Behavior: Behavior normal.         Thought Content: Thought content normal.         Judgment: Judgment normal.       Diagnoses and health risks identified today and associated recommendations/orders:  1. Encounter for preventive health examination  Reviewed and discussed preventive health screenings and " vaccinations with the patient.   - Needs appt for annual with PCP - will reach out to staff to assist with scheduling.   - DEXA scan ordered/scheduled for next month.   - COVID-19 boosters available at the pharmacy.     2. Stage 3a chronic kidney disease  Maxzide dosage decreased by Dr. Diehl; patient has been staying well-hydrated and avoiding nephrotoxic agents. Encouraged her to follow up with PCP for annual and labs.     3. Dyslipidemia  Stable/at goal on Lipitor. Continue current treatment plan as previously prescribed with your PCP     Lab Results   Component Value Date    CHOL 173 09/13/2021    CHOL 195 09/17/2020    CHOL 183 06/24/2019     Lab Results   Component Value Date    HDL 68 09/13/2021    HDL 71 09/17/2020    HDL 74 06/24/2019     Lab Results   Component Value Date    LDLCALC 89.4 09/13/2021    LDLCALC 107.6 09/17/2020    LDLCALC 94.6 06/24/2019     No results found for: DLDL  Lab Results   Component Value Date    TRIG 78 09/13/2021    TRIG 82 09/17/2020    TRIG 72 06/24/2019     Lab Results   Component Value Date    CHOLHDL 39.3 09/13/2021    CHOLHDL 36.4 09/17/2020    CHOLHDL 40.4 06/24/2019       4. Benign hypertensive heart disease without congestive heart failure  Stable/at goal on Maxzide and Metoprolol. Continue current treatment plan as previously prescribed with your PCP and Cardiologist.     BP Readings from Last 3 Encounters:   03/28/23 136/74   01/06/23 126/80   11/28/22 132/76     5. Palpitation  Patient reported episodes of palpitations with fluctuating heart rates and presyncope. Evaluated by cardiology with 48-hour heart monitor - no significant dysrhythmias noted. Labs c/w mild dehydration, so Maxzide dosage decreased to see if dehydration was causing these episodes. Patient notes she has not had an episode in about 4 weeks. She has started to have ankle edema since decreasing diuretic. Advised patient to follow up with Dr. Diehl for eval/management.     6. Supraventricular  dysrhythmia  Evaluated by Dr. Diehl as noted above. Episodes have been quiescent for past 4 weeks. Continue current treatment plan as previously prescribed with your Cardiologist and follow up with him for continued monitoring and management.     7. Abnormal glucose  Due for updated lab work. Continue current treatment plan as previously prescribed with your PCP and f/u with them for monitoring.     Lab Results   Component Value Date    HGBA1C 5.7 (H) 09/13/2021     8. Moderate stage glaucoma  Stable with latanoprost; eye exam is UTD. Continue current treatment plan as previously prescribed with your ophthalmologist.     9. Osteoporosis, unspecified osteoporosis type, unspecified pathological fracture presence  Updated DEXA scan ordered/scheduled. Stable. Continue current treatment plan as previously prescribed with your PCP.   - DXA Bone Density Axial Skeleton 1 or more sites; Future    10. Hypothyroidism due to acquired atrophy of thyroid  Stable on levothyroxine. Continue current treatment plan as previously prescribed with your PCP.     Lab Results   Component Value Date    TSH 0.764 01/06/2023    FREET4 1.34 01/06/2023       11. Vitamin D deficiency  Stable. Continue current treatment plan as previously prescribed with your PCP    12. Postmenopausal  - DXA Bone Density Axial Skeleton 1 or more sites; Future    13. Ankle edema   Trace ankle edema on exam today. Patient notes edema started when she decreased Maxzide dose. Patient is to focus on low-sodium diet and elevate BLE as needed. Follow up with Cardiologist for further evaluation and management.     Opioid screen: No Rx for Opioids     I offered to discuss advanced care planning, including how to pick a person who would make decisions for you if you were unable to make them for yourself, called a health care power of , and what kind of decisions you might make such as use of life sustaining treatments such as ventilators and tube feeding when faced  with a life limiting illness recorded on a living will that they will need to know. (How you want to be cared for as you near the end of your natural life)      X  Patient has advanced directives written and agrees to provide copies to the institution.    Provided Iliana with a 5-10 year written screening schedule and personal prevention plan. Recommendations were developed using the USPSTF age appropriate recommendations. Education, counseling, and referrals were provided as needed.  After Visit Summary printed and given to patient which includes a list of additional screenings\tests needed.    Follow up in about 1 year (around 3/28/2024).      Kelsey Swenson, NP

## 2023-03-28 NOTE — Clinical Note
Good morning,   Mrs. Mariano was seen this morning for her annual Medicare eAWV. She was seen by Dr. Diehl for evaluation of heart palpitations back in January 2023 and was wondering if she needs a follow up appointment with Dr. Diehl? Her Maxzide dosage was lowered, and her palpitations have been quiescent for about 4 weeks but she has started having lower extremity edema. If a follow up is needed, can your team please assist in scheduling?   Thank you,   JUDIT Mccartney

## 2023-04-18 ENCOUNTER — APPOINTMENT (OUTPATIENT)
Dept: RADIOLOGY | Facility: HOSPITAL | Age: 78
End: 2023-04-18
Attending: NURSE PRACTITIONER
Payer: MEDICARE

## 2023-04-18 DIAGNOSIS — M81.0 OSTEOPOROSIS, UNSPECIFIED OSTEOPOROSIS TYPE, UNSPECIFIED PATHOLOGICAL FRACTURE PRESENCE: ICD-10-CM

## 2023-04-18 DIAGNOSIS — Z78.0 POSTMENOPAUSAL: ICD-10-CM

## 2023-04-18 PROCEDURE — 77080 DXA BONE DENSITY AXIAL SKELETON 1 OR MORE SITES: ICD-10-PCS | Mod: 26,HCNC,, | Performed by: RADIOLOGY

## 2023-04-18 PROCEDURE — 77080 DXA BONE DENSITY AXIAL: CPT | Mod: TC,HCNC

## 2023-04-18 PROCEDURE — 77080 DXA BONE DENSITY AXIAL: CPT | Mod: 26,HCNC,, | Performed by: RADIOLOGY

## 2023-04-24 DIAGNOSIS — M81.0 OSTEOPOROSIS, UNSPECIFIED OSTEOPOROSIS TYPE, UNSPECIFIED PATHOLOGICAL FRACTURE PRESENCE: Primary | ICD-10-CM

## 2023-05-17 ENCOUNTER — OFFICE VISIT (OUTPATIENT)
Dept: CARDIOLOGY | Facility: CLINIC | Age: 78
End: 2023-05-17
Payer: MEDICARE

## 2023-05-17 VITALS
WEIGHT: 149.69 LBS | HEART RATE: 93 BPM | OXYGEN SATURATION: 98 % | SYSTOLIC BLOOD PRESSURE: 126 MMHG | BODY MASS INDEX: 24.94 KG/M2 | HEIGHT: 65 IN | DIASTOLIC BLOOD PRESSURE: 80 MMHG

## 2023-05-17 DIAGNOSIS — M79.89 LEG SWELLING: ICD-10-CM

## 2023-05-17 DIAGNOSIS — R94.31 ABNORMAL EKG: Primary | ICD-10-CM

## 2023-05-17 DIAGNOSIS — R00.2 PALPITATION: ICD-10-CM

## 2023-05-17 DIAGNOSIS — I49.9 SUPRAVENTRICULAR DYSRHYTHMIA: ICD-10-CM

## 2023-05-17 DIAGNOSIS — E78.5 DYSLIPIDEMIA: ICD-10-CM

## 2023-05-17 DIAGNOSIS — N18.31 STAGE 3A CHRONIC KIDNEY DISEASE: ICD-10-CM

## 2023-05-17 DIAGNOSIS — I11.9 BENIGN HYPERTENSIVE HEART DISEASE WITHOUT CONGESTIVE HEART FAILURE: ICD-10-CM

## 2023-05-17 DIAGNOSIS — R73.09 ABNORMAL GLUCOSE: ICD-10-CM

## 2023-05-17 DIAGNOSIS — R60.9 EDEMA, UNSPECIFIED TYPE: ICD-10-CM

## 2023-05-17 PROCEDURE — 3079F DIAST BP 80-89 MM HG: CPT | Mod: CPTII,,, | Performed by: INTERNAL MEDICINE

## 2023-05-17 PROCEDURE — 3079F PR MOST RECENT DIASTOLIC BLOOD PRESSURE 80-89 MM HG: ICD-10-PCS | Mod: CPTII,,, | Performed by: INTERNAL MEDICINE

## 2023-05-17 PROCEDURE — 99999 PR PBB SHADOW E&M-EST. PATIENT-LVL III: CPT | Mod: PBBFAC,,, | Performed by: INTERNAL MEDICINE

## 2023-05-17 PROCEDURE — 1126F PR PAIN SEVERITY QUANTIFIED, NO PAIN PRESENT: ICD-10-PCS | Mod: CPTII,,, | Performed by: INTERNAL MEDICINE

## 2023-05-17 PROCEDURE — 3074F SYST BP LT 130 MM HG: CPT | Mod: CPTII,,, | Performed by: INTERNAL MEDICINE

## 2023-05-17 PROCEDURE — 1159F MED LIST DOCD IN RCRD: CPT | Mod: CPTII,,, | Performed by: INTERNAL MEDICINE

## 2023-05-17 PROCEDURE — 99213 PR OFFICE/OUTPT VISIT, EST, LEVL III, 20-29 MIN: ICD-10-PCS | Mod: ,,, | Performed by: INTERNAL MEDICINE

## 2023-05-17 PROCEDURE — 3288F PR FALLS RISK ASSESSMENT DOCUMENTED: ICD-10-PCS | Mod: CPTII,,, | Performed by: INTERNAL MEDICINE

## 2023-05-17 PROCEDURE — 1160F RVW MEDS BY RX/DR IN RCRD: CPT | Mod: CPTII,,, | Performed by: INTERNAL MEDICINE

## 2023-05-17 PROCEDURE — 1101F PT FALLS ASSESS-DOCD LE1/YR: CPT | Mod: CPTII,,, | Performed by: INTERNAL MEDICINE

## 2023-05-17 PROCEDURE — 1101F PR PT FALLS ASSESS DOC 0-1 FALLS W/OUT INJ PAST YR: ICD-10-PCS | Mod: CPTII,,, | Performed by: INTERNAL MEDICINE

## 2023-05-17 PROCEDURE — 1126F AMNT PAIN NOTED NONE PRSNT: CPT | Mod: CPTII,,, | Performed by: INTERNAL MEDICINE

## 2023-05-17 PROCEDURE — 1159F PR MEDICATION LIST DOCUMENTED IN MEDICAL RECORD: ICD-10-PCS | Mod: CPTII,,, | Performed by: INTERNAL MEDICINE

## 2023-05-17 PROCEDURE — 3074F PR MOST RECENT SYSTOLIC BLOOD PRESSURE < 130 MM HG: ICD-10-PCS | Mod: CPTII,,, | Performed by: INTERNAL MEDICINE

## 2023-05-17 PROCEDURE — 99999 PR PBB SHADOW E&M-EST. PATIENT-LVL III: ICD-10-PCS | Mod: PBBFAC,,, | Performed by: INTERNAL MEDICINE

## 2023-05-17 PROCEDURE — 3288F FALL RISK ASSESSMENT DOCD: CPT | Mod: CPTII,,, | Performed by: INTERNAL MEDICINE

## 2023-05-17 PROCEDURE — 99213 OFFICE O/P EST LOW 20 MIN: CPT | Mod: ,,, | Performed by: INTERNAL MEDICINE

## 2023-05-17 PROCEDURE — 1160F PR REVIEW ALL MEDS BY PRESCRIBER/CLIN PHARMACIST DOCUMENTED: ICD-10-PCS | Mod: CPTII,,, | Performed by: INTERNAL MEDICINE

## 2023-05-17 NOTE — PROGRESS NOTES
Subjective:   Patient ID:  Iliana Mariano is a 77 y.o. female who presents for follow up of No chief complaint on file.      HPI  8/19/2021   A 74 YO FEMALE REFERRED FROM DR DWYER because of recurrent sudden episode of feeling dizziness lightheadedness feeling funny disoriented and had to sit down abort what she is doing could not feel  Her pulse felt racing irregular could not count it. Had a total of 2 episodes. Her ekg today ivcd of the lbbb type. No ischemic changes her ekg is unchanged since 2013. Has been feeling tired lately no nap during the day. She sleeps well at nite no snoring. No tia claudication no chest pain no shortness of breath has feeling of imbalance while walking no focal weakness. Has a cardiolite in 2013 was negative. she is fairly active. Carotid stent pending   Strong fh cad. She was placed on baby asa ec.     9/17/2021  Her efor f/u test results still feels tired . No further dizziness lightheadedness     12/17/2021 Tuesday after thanksgiving she got up felt funny  Lightheaded felt her heart unusual ? Fast . Had another episode last week. She monitored bp it was elevated . Her bp fluctuiated. She was tired . Her rt foot goes numb when she sits down. Has no other issues clinically      1/6/2023   Gets episodes of lightheadedness dizziness feels like she is going to pass out. Her bp fluctuates and her heart rate fluctuates with it has no syncope has no fall no chest pain.    no change in her meds. She drinks 1 cup of coffee in the morning.    5/17/2023    Has some ankle swelling. Her heart rate still variable. Has no cardiac symptoms she gets tired.  Worse  ankle swelling more with end of day?   Past Medical History:   Diagnosis Date    Gastritis     GERD (gastroesophageal reflux disease)     Glaucoma suspect with open angle 5-14-13    Hemorrhoid     Hiatal hernia     Hypertension 6/7/2013    Osteoarthritis 2011    Osteoporosis, unspecified     Other and unspecified hyperlipidemia      Unspecified essential hypertension     Unspecified hypothyroidism     Unspecified vitamin D deficiency        Past Surgical History:   Procedure Laterality Date    COLONOSCOPY N/A 02/10/2017    Procedure: COLONOSCOPY;  Surgeon: Alonzo Waterman MD;  Location: [facility];  Service: Endoscopy;  Laterality: N/A;    HYSTERECTOMY  1990    OOPHORECTOMY  1990    SLT OD  06/06/2013    SLT OS  08/15/2013       Social History     Tobacco Use    Smoking status: Never    Smokeless tobacco: Never   Substance Use Topics    Alcohol use: No    Drug use: No       Family History   Problem Relation Age of Onset    Glaucoma Mother     Cataracts Mother     Diabetes Mother     Hypertension Mother     Stroke Mother     Cataracts Father     Diabetes Sister     Hypertension Sister     Diabetes Brother     Diabetes Maternal Grandfather     Blindness Maternal Grandfather     Thyroid disease Paternal Grandmother     Ovarian cancer Other     Breast cancer Paternal Aunt     Strabismus Neg Hx     Retinal detachment Neg Hx     Macular degeneration Neg Hx     Amblyopia Neg Hx        Current Outpatient Medications   Medication Sig    aspirin (ECOTRIN) 81 MG EC tablet Take 81 mg by mouth once daily.    ergocalciferol, vitamin D2, 1,000 unit Cap Take 1,000 Units by mouth.    latanoprost 0.005 % ophthalmic solution Place 1 drop into both eyes every evening.    levothyroxine (SYNTHROID) 100 MCG tablet TAKE 1 TABLET EVERY DAY    LORATADINE (CLARITIN ORAL) Take by mouth as needed.    metoprolol succinate (TOPROL-XL) 25 MG 24 hr tablet TAKE 1 TABLET ONE TIME DAILY    pantoprazole (PROTONIX) 40 MG tablet TAKE 1 TABLET EVERY DAY    pravastatin (PRAVACHOL) 40 MG tablet TAKE 1 TABLET EVERY DAY    triamterene-hydrochlorothiazide 37.5-25 mg (MAXZIDE-25) 37.5-25 mg per tablet Take 1 tablet by mouth once daily. TAKE 1 tablet daily as of 1/9/23     No current facility-administered medications for this visit.     Current Outpatient Medications on File Prior to  Visit   Medication Sig    aspirin (ECOTRIN) 81 MG EC tablet Take 81 mg by mouth once daily.    ergocalciferol, vitamin D2, 1,000 unit Cap Take 1,000 Units by mouth.    latanoprost 0.005 % ophthalmic solution Place 1 drop into both eyes every evening.    levothyroxine (SYNTHROID) 100 MCG tablet TAKE 1 TABLET EVERY DAY    LORATADINE (CLARITIN ORAL) Take by mouth as needed.    metoprolol succinate (TOPROL-XL) 25 MG 24 hr tablet TAKE 1 TABLET ONE TIME DAILY    pantoprazole (PROTONIX) 40 MG tablet TAKE 1 TABLET EVERY DAY    pravastatin (PRAVACHOL) 40 MG tablet TAKE 1 TABLET EVERY DAY    triamterene-hydrochlorothiazide 37.5-25 mg (MAXZIDE-25) 37.5-25 mg per tablet Take 1 tablet by mouth once daily. TAKE 1 tablet daily as of 1/9/23     No current facility-administered medications on file prior to visit.     Review of patient's allergies indicates:   Allergen Reactions    Sulfa (sulfonamide antibiotics) Hives      Review of Systems   Constitutional: Negative for diaphoresis, malaise/fatigue and weight gain.   HENT:  Negative for hoarse voice.    Eyes:  Negative for double vision and visual disturbance.   Cardiovascular:  Positive for leg swelling. Negative for chest pain, claudication, cyanosis, dyspnea on exertion, irregular heartbeat, near-syncope, orthopnea, palpitations, paroxysmal nocturnal dyspnea and syncope.   Respiratory:  Negative for cough, hemoptysis, shortness of breath and snoring.    Hematologic/Lymphatic: Negative for bleeding problem. Does not bruise/bleed easily.   Skin:  Negative for color change and poor wound healing.   Musculoskeletal:  Negative for muscle cramps, muscle weakness and myalgias.   Gastrointestinal:  Negative for bloating, abdominal pain, change in bowel habit, diarrhea, heartburn, hematemesis, hematochezia, melena and nausea.   Neurological:  Negative for excessive daytime sleepiness, dizziness, headaches, light-headedness, loss of balance, numbness and weakness.    Psychiatric/Behavioral:  Negative for memory loss. The patient does not have insomnia.    Allergic/Immunologic: Negative for hives.     Objective:   Physical Exam  Constitutional:       General: She is not in acute distress.     Appearance: Normal appearance. She is well-developed. She is not ill-appearing.   HENT:      Head: Normocephalic and atraumatic.   Eyes:      General: No scleral icterus.     Pupils: Pupils are equal, round, and reactive to light.   Neck:      Thyroid: No thyromegaly.      Vascular: Normal carotid pulses. No carotid bruit, hepatojugular reflux or JVD.      Trachea: No tracheal deviation.   Cardiovascular:      Rate and Rhythm: Normal rate and regular rhythm.      Pulses: Normal pulses.      Heart sounds: Normal heart sounds. No murmur heard.    No friction rub. No gallop.   Pulmonary:      Effort: Pulmonary effort is normal. No respiratory distress.      Breath sounds: Normal breath sounds. No wheezing, rhonchi or rales.   Chest:      Chest wall: No tenderness.   Abdominal:      General: Bowel sounds are normal. There is no abdominal bruit.      Palpations: Abdomen is soft. There is no hepatomegaly or pulsatile mass.      Tenderness: There is no abdominal tenderness.   Musculoskeletal:      Right shoulder: No deformity.      Cervical back: Normal range of motion and neck supple.   Skin:     General: Skin is warm and dry.      Findings: No erythema or rash.      Nails: There is no clubbing.   Neurological:      Mental Status: She is alert and oriented to person, place, and time.      Cranial Nerves: No cranial nerve deficit.      Coordination: Coordination normal.   Psychiatric:         Speech: Speech normal.         Behavior: Behavior normal.     Vitals:    05/17/23 1405 05/17/23 1406   BP: 128/78 126/80   BP Location: Left arm Right arm   Patient Position: Sitting Sitting   BP Method: Medium (Manual) Medium (Manual)   Pulse: 93    SpO2: 98%    Weight: 67.9 kg (149 lb 11.1 oz)    Height:  "5' 5" (1.651 m)      Lab Results   Component Value Date    CHOL 173 09/13/2021    CHOL 195 09/17/2020    CHOL 183 06/24/2019      Body mass index is 24.91 kg/m².   Lab Results   Component Value Date    HGBA1C 5.7 (H) 09/13/2021      BMP  Lab Results   Component Value Date     01/06/2023    K 4.2 01/06/2023     01/06/2023    CO2 30 (H) 01/06/2023    BUN 17 01/06/2023    CREATININE 1.2 01/06/2023    CALCIUM 10.3 01/06/2023    ANIONGAP 8 01/06/2023    EGFRNORACEVR 46.9 (A) 01/06/2023      Lab Results   Component Value Date    HDL 68 09/13/2021    HDL 71 09/17/2020    HDL 74 06/24/2019     Lab Results   Component Value Date    LDLCALC 89.4 09/13/2021    LDLCALC 107.6 09/17/2020    LDLCALC 94.6 06/24/2019     Lab Results   Component Value Date    TRIG 78 09/13/2021    TRIG 82 09/17/2020    TRIG 72 06/24/2019     Lab Results   Component Value Date    CHOLHDL 39.3 09/13/2021    CHOLHDL 36.4 09/17/2020    CHOLHDL 40.4 06/24/2019       Chemistry        Component Value Date/Time     01/06/2023 1613    K 4.2 01/06/2023 1613     01/06/2023 1613    CO2 30 (H) 01/06/2023 1613    BUN 17 01/06/2023 1613    CREATININE 1.2 01/06/2023 1613     (H) 01/06/2023 1613        Component Value Date/Time    CALCIUM 10.3 01/06/2023 1613    ALKPHOS 103 01/06/2023 1613    AST 23 01/06/2023 1613    ALT 18 01/06/2023 1613    BILITOT 0.6 01/06/2023 1613    ESTGFRAFRICA >60.0 08/13/2021 0807    EGFRNONAA >60.0 08/13/2021 0807          Lab Results   Component Value Date    TSH 0.764 01/06/2023     No results found for: INR, PROTIME  Lab Results   Component Value Date    WBC 11.51 01/06/2023    HGB 14.1 01/06/2023    HCT 45.0 01/06/2023    MCV 96 01/06/2023     01/06/2023     BMP  Sodium   Date Value Ref Range Status   01/06/2023 142 136 - 145 mmol/L Final     Potassium   Date Value Ref Range Status   01/06/2023 4.2 3.5 - 5.1 mmol/L Final     Chloride   Date Value Ref Range Status   01/06/2023 104 95 - 110 mmol/L " Final     CO2   Date Value Ref Range Status   01/06/2023 30 (H) 23 - 29 mmol/L Final     BUN   Date Value Ref Range Status   01/06/2023 17 8 - 23 mg/dL Final     Creatinine   Date Value Ref Range Status   01/06/2023 1.2 0.5 - 1.4 mg/dL Final     Calcium   Date Value Ref Range Status   01/06/2023 10.3 8.7 - 10.5 mg/dL Final     Anion Gap   Date Value Ref Range Status   01/06/2023 8 8 - 16 mmol/L Final     eGFR if    Date Value Ref Range Status   08/13/2021 >60.0 >60 mL/min/1.73 m^2 Final     eGFR if non    Date Value Ref Range Status   08/13/2021 >60.0 >60 mL/min/1.73 m^2 Final     Comment:     Calculation used to obtain the estimated glomerular filtration  rate (eGFR) is the CKD-EPI equation.        CrCl cannot be calculated (Patient's most recent lab result is older than the maximum 7 days allowed.).    Assessment:     1. Abnormal EKG    2. Dyslipidemia    3. Benign hypertensive heart disease without congestive heart failure    4. Palpitation    5. Supraventricular dysrhythmia    6. Stage 3a chronic kidney disease    7. Abnormal glucose      Has no change in status as far as heart rate variability  will continue same meds   Leg swelling is probably  venous insufficiency and will suggest venous insufficiency   Plan:   Venous duplex   Compression socks   Leg elevation  6 months f/u

## 2023-06-07 ENCOUNTER — HOSPITAL ENCOUNTER (OUTPATIENT)
Dept: CARDIOLOGY | Facility: HOSPITAL | Age: 78
Discharge: HOME | End: 2023-06-07
Attending: INTERNAL MEDICINE
Payer: MEDICARE

## 2023-06-07 VITALS
SYSTOLIC BLOOD PRESSURE: 126 MMHG | WEIGHT: 145 LBS | BODY MASS INDEX: 24.16 KG/M2 | DIASTOLIC BLOOD PRESSURE: 80 MMHG | HEIGHT: 65 IN

## 2023-06-07 DIAGNOSIS — M79.89 LEG SWELLING: ICD-10-CM

## 2023-06-07 DIAGNOSIS — R60.9 EDEMA, UNSPECIFIED TYPE: ICD-10-CM

## 2023-06-07 PROCEDURE — 93970 CV US DOPPLER VENOUS LEGS BILATERAL (CUPID ONLY): ICD-10-PCS | Mod: 26,,, | Performed by: INTERNAL MEDICINE

## 2023-06-07 PROCEDURE — 93970 EXTREMITY STUDY: CPT | Mod: 26,,, | Performed by: INTERNAL MEDICINE

## 2023-06-07 PROCEDURE — 93970 EXTREMITY STUDY: CPT | Mod: PO

## 2023-06-08 ENCOUNTER — TELEPHONE (OUTPATIENT)
Dept: CARDIOLOGY | Facility: CLINIC | Age: 78
End: 2023-06-08
Payer: MEDICARE

## 2023-06-08 NOTE — TELEPHONE ENCOUNTER
.LVM for patient to call the office regarding results.      ----- Message from Kathy Diehl MD sent at 6/7/2023  4:50 PM CDT -----  No blood clots

## 2023-06-08 NOTE — TELEPHONE ENCOUNTER
Patient was notified of results. All questions were answered. Pt verbalized understanding. Pt will call back with any other questions or concerns.        ----- Message from Kathy Diehl MD sent at 6/7/2023  4:50 PM CDT -----  No blood clots    ----- Message from Radha Santos sent at 6/8/2023 10:04 AM CDT -----  Pt is requesting a call back concerning the call she missed.

## 2023-06-15 ENCOUNTER — OFFICE VISIT (OUTPATIENT)
Dept: RHEUMATOLOGY | Facility: CLINIC | Age: 78
End: 2023-06-15
Payer: MEDICARE

## 2023-06-15 ENCOUNTER — LAB VISIT (OUTPATIENT)
Dept: LAB | Facility: HOSPITAL | Age: 78
End: 2023-06-15
Attending: FAMILY MEDICINE
Payer: MEDICARE

## 2023-06-15 VITALS
DIASTOLIC BLOOD PRESSURE: 78 MMHG | HEART RATE: 65 BPM | WEIGHT: 145.06 LBS | HEIGHT: 65 IN | BODY MASS INDEX: 24.17 KG/M2 | SYSTOLIC BLOOD PRESSURE: 113 MMHG

## 2023-06-15 DIAGNOSIS — M81.0 OSTEOPOROSIS, UNSPECIFIED OSTEOPOROSIS TYPE, UNSPECIFIED PATHOLOGICAL FRACTURE PRESENCE: ICD-10-CM

## 2023-06-15 DIAGNOSIS — M81.0 OSTEOPOROSIS, UNSPECIFIED OSTEOPOROSIS TYPE, UNSPECIFIED PATHOLOGICAL FRACTURE PRESENCE: Primary | ICD-10-CM

## 2023-06-15 DIAGNOSIS — N18.31 STAGE 3A CHRONIC KIDNEY DISEASE: ICD-10-CM

## 2023-06-15 DIAGNOSIS — K21.9 GASTROESOPHAGEAL REFLUX DISEASE, UNSPECIFIED WHETHER ESOPHAGITIS PRESENT: ICD-10-CM

## 2023-06-15 DIAGNOSIS — Z51.81 MEDICATION MONITORING ENCOUNTER: ICD-10-CM

## 2023-06-15 LAB
ALBUMIN SERPL BCP-MCNC: 3.9 G/DL (ref 3.5–5.2)
ALP SERPL-CCNC: 90 U/L (ref 55–135)
ALT SERPL W/O P-5'-P-CCNC: 19 U/L (ref 10–44)
ANION GAP SERPL CALC-SCNC: 10 MMOL/L (ref 8–16)
AST SERPL-CCNC: 22 U/L (ref 10–40)
BILIRUB SERPL-MCNC: 0.5 MG/DL (ref 0.1–1)
BUN SERPL-MCNC: 16 MG/DL (ref 8–23)
CALCIUM SERPL-MCNC: 10.1 MG/DL (ref 8.7–10.5)
CHLORIDE SERPL-SCNC: 104 MMOL/L (ref 95–110)
CO2 SERPL-SCNC: 25 MMOL/L (ref 23–29)
CREAT SERPL-MCNC: 0.9 MG/DL (ref 0.5–1.4)
EST. GFR  (NO RACE VARIABLE): >60 ML/MIN/1.73 M^2
GLUCOSE SERPL-MCNC: 96 MG/DL (ref 70–110)
POTASSIUM SERPL-SCNC: 4 MMOL/L (ref 3.5–5.1)
PROT SERPL-MCNC: 7.5 G/DL (ref 6–8.4)
SODIUM SERPL-SCNC: 139 MMOL/L (ref 136–145)

## 2023-06-15 PROCEDURE — 99999 PR PBB SHADOW E&M-EST. PATIENT-LVL III: CPT | Mod: PBBFAC,,, | Performed by: INTERNAL MEDICINE

## 2023-06-15 PROCEDURE — 99999 PR PBB SHADOW E&M-EST. PATIENT-LVL III: ICD-10-PCS | Mod: PBBFAC,,, | Performed by: INTERNAL MEDICINE

## 2023-06-15 PROCEDURE — 99204 PR OFFICE/OUTPT VISIT, NEW, LEVL IV, 45-59 MIN: ICD-10-PCS | Mod: S$GLB,,, | Performed by: INTERNAL MEDICINE

## 2023-06-15 PROCEDURE — 3074F PR MOST RECENT SYSTOLIC BLOOD PRESSURE < 130 MM HG: ICD-10-PCS | Mod: CPTII,S$GLB,, | Performed by: INTERNAL MEDICINE

## 2023-06-15 PROCEDURE — 99204 OFFICE O/P NEW MOD 45 MIN: CPT | Mod: S$GLB,,, | Performed by: INTERNAL MEDICINE

## 2023-06-15 PROCEDURE — 82306 VITAMIN D 25 HYDROXY: CPT | Performed by: INTERNAL MEDICINE

## 2023-06-15 PROCEDURE — 3078F DIAST BP <80 MM HG: CPT | Mod: CPTII,S$GLB,, | Performed by: INTERNAL MEDICINE

## 2023-06-15 PROCEDURE — 1101F PR PT FALLS ASSESS DOC 0-1 FALLS W/OUT INJ PAST YR: ICD-10-PCS | Mod: CPTII,S$GLB,, | Performed by: INTERNAL MEDICINE

## 2023-06-15 PROCEDURE — 3288F FALL RISK ASSESSMENT DOCD: CPT | Mod: CPTII,S$GLB,, | Performed by: INTERNAL MEDICINE

## 2023-06-15 PROCEDURE — 1101F PT FALLS ASSESS-DOCD LE1/YR: CPT | Mod: CPTII,S$GLB,, | Performed by: INTERNAL MEDICINE

## 2023-06-15 PROCEDURE — 3074F SYST BP LT 130 MM HG: CPT | Mod: CPTII,S$GLB,, | Performed by: INTERNAL MEDICINE

## 2023-06-15 PROCEDURE — 3078F PR MOST RECENT DIASTOLIC BLOOD PRESSURE < 80 MM HG: ICD-10-PCS | Mod: CPTII,S$GLB,, | Performed by: INTERNAL MEDICINE

## 2023-06-15 PROCEDURE — 36415 COLL VENOUS BLD VENIPUNCTURE: CPT | Performed by: INTERNAL MEDICINE

## 2023-06-15 PROCEDURE — 3288F PR FALLS RISK ASSESSMENT DOCUMENTED: ICD-10-PCS | Mod: CPTII,S$GLB,, | Performed by: INTERNAL MEDICINE

## 2023-06-15 PROCEDURE — 1159F PR MEDICATION LIST DOCUMENTED IN MEDICAL RECORD: ICD-10-PCS | Mod: CPTII,S$GLB,, | Performed by: INTERNAL MEDICINE

## 2023-06-15 PROCEDURE — 80053 COMPREHEN METABOLIC PANEL: CPT | Performed by: INTERNAL MEDICINE

## 2023-06-15 PROCEDURE — 1126F PR PAIN SEVERITY QUANTIFIED, NO PAIN PRESENT: ICD-10-PCS | Mod: CPTII,S$GLB,, | Performed by: INTERNAL MEDICINE

## 2023-06-15 PROCEDURE — 1126F AMNT PAIN NOTED NONE PRSNT: CPT | Mod: CPTII,S$GLB,, | Performed by: INTERNAL MEDICINE

## 2023-06-15 PROCEDURE — 1159F MED LIST DOCD IN RCRD: CPT | Mod: CPTII,S$GLB,, | Performed by: INTERNAL MEDICINE

## 2023-06-15 NOTE — PROGRESS NOTES
RHEUMATOLOGY OUTPATIENT CLINIC NOTE    6/15/2023    Attending Rheumatologist: Benjamin Aldana  Primary Care Provider/Physician Requesting Consultation: Breonna Borja MD   Chief Complaint/Reason For Consultation:  Osteoporosis      Subjective:     Iliana Mariano is a 77 y.o. White female with OP     No acute complaints.  Reports GI intolerance to Fosamax in the past. Hx of rib fx s/p mechanical fall.    Review of Systems   Constitutional:  Negative for fever.   Cardiovascular:         No hx of MI   Gastrointestinal:  Negative for blood in stool.   Genitourinary:         No hx of nephrolithiasis   Musculoskeletal:  Negative for back pain and joint pain.        No hx of gout   Skin:  Negative for rash.   Neurological:  Negative for weakness.        No hx of CVA   Endo/Heme/Allergies:         No hx of radiation     Chronic comorbid conditions affecting medical decision making today:  Past Medical History:   Diagnosis Date    Gastritis     GERD (gastroesophageal reflux disease)     Glaucoma suspect with open angle 5-14-13    Hemorrhoid     Hiatal hernia     Hypertension 6/7/2013    Osteoarthritis 2011    Osteoporosis, unspecified     Other and unspecified hyperlipidemia     Unspecified essential hypertension     Unspecified hypothyroidism     Unspecified vitamin D deficiency      Past Surgical History:   Procedure Laterality Date    COLONOSCOPY N/A 02/10/2017    Procedure: COLONOSCOPY;  Surgeon: Alonzo Waterman MD;  Location: [facility];  Service: Endoscopy;  Laterality: N/A;    HYSTERECTOMY  1990    OOPHORECTOMY  1990    SLT OD  06/06/2013    SLT OS  08/15/2013     Family History   Problem Relation Age of Onset    Glaucoma Mother     Cataracts Mother     Diabetes Mother     Hypertension Mother     Stroke Mother     Cataracts Father     Diabetes Sister     Hypertension Sister     Diabetes Brother     Diabetes Maternal Grandfather     Blindness Maternal Grandfather     Thyroid disease Paternal Grandmother      Ovarian cancer Other     Breast cancer Paternal Aunt     Strabismus Neg Hx     Retinal detachment Neg Hx     Macular degeneration Neg Hx     Amblyopia Neg Hx      Social History     Tobacco Use   Smoking Status Never   Smokeless Tobacco Never       Current Outpatient Medications:     aspirin (ECOTRIN) 81 MG EC tablet, Take 81 mg by mouth once daily., Disp: , Rfl:     ergocalciferol, vitamin D2, 1,000 unit Cap, Take 1,000 Units by mouth., Disp: , Rfl:     latanoprost 0.005 % ophthalmic solution, Place 1 drop into both eyes every evening., Disp: 1 Bottle, Rfl: 6    levothyroxine (SYNTHROID) 100 MCG tablet, TAKE 1 TABLET EVERY DAY, Disp: 90 tablet, Rfl: 3    LORATADINE (CLARITIN ORAL), Take by mouth as needed., Disp: , Rfl:     metoprolol succinate (TOPROL-XL) 25 MG 24 hr tablet, TAKE 1 TABLET ONE TIME DAILY, Disp: 90 tablet, Rfl: 3    pantoprazole (PROTONIX) 40 MG tablet, TAKE 1 TABLET EVERY DAY, Disp: 90 tablet, Rfl: 3    pravastatin (PRAVACHOL) 40 MG tablet, TAKE 1 TABLET EVERY DAY, Disp: 90 tablet, Rfl: 0    triamterene-hydrochlorothiazide 37.5-25 mg (MAXZIDE-25) 37.5-25 mg per tablet, Take 1 tablet by mouth once daily. TAKE 1 tablet daily as of 1/9/23, Disp: 90 tablet, Rfl: 0     Objective:     Vitals:    06/15/23 1021   BP: 113/78   Pulse: 65     Physical Exam   Eyes: Conjunctivae are normal.   Pulmonary/Chest: Effort normal. No respiratory distress.   Musculoskeletal:         General: Deformity present. No swelling or tenderness. Normal range of motion.   Neurological: She displays no weakness.   Skin: No rash noted.     Reviewed available old and all outside pertinent medical records available.    All lab results personally reviewed and interpreted by me.       ASSESSMENT      Encounter Diagnoses   Name Primary?    Osteoporosis, unspecified osteoporosis type, unspecified pathological fracture presence Yes    Stage 3a chronic kidney disease     Gastroesophageal reflux disease, unspecified whether esophagitis  present     Medication monitoring encounter       PLAN     OP per DXA criteria. Probable past fragility fractures.  GI intolerance to bisphosphonate.  No synovitis on exam.  TTP spine absent.  No hypocalcemia on lat set of labs from 1/2023.  DJD changes on imaging.  No marginal erosive changes reported.  Osteoporotic range T scores.  Recommend 2 year course of Prolia.  Side effects discussed.  Ca/vit D supp.  Labs today, repeat close to f/u visit.    Benjamin Aldana M.D.

## 2023-06-16 LAB — 25(OH)D3+25(OH)D2 SERPL-MCNC: 49 NG/ML (ref 30–96)

## 2023-06-29 ENCOUNTER — INFUSION (OUTPATIENT)
Dept: INFUSION THERAPY | Facility: HOSPITAL | Age: 78
End: 2023-06-29
Attending: INTERNAL MEDICINE
Payer: MEDICARE

## 2023-06-29 DIAGNOSIS — N18.31 STAGE 3A CHRONIC KIDNEY DISEASE: Primary | ICD-10-CM

## 2023-06-29 DIAGNOSIS — M81.0 AGE-RELATED OSTEOPOROSIS WITHOUT CURRENT PATHOLOGICAL FRACTURE: ICD-10-CM

## 2023-06-29 NOTE — NURSING
Holding prolia injection today, per Dr Aldana, until patient see's dentist next week regarding possible dental work. Okay to proceed with same labs next week. Patient verbalizes understanding and will use my chart to keep up with her [employer]hart account to manage upcoming appointments.

## 2023-07-05 ENCOUNTER — TELEPHONE (OUTPATIENT)
Dept: RHEUMATOLOGY | Facility: CLINIC | Age: 78
End: 2023-07-05
Payer: MEDICARE

## 2023-07-05 NOTE — TELEPHONE ENCOUNTER
----- Message from Hawk Yu sent at 7/5/2023 11:06 AM CDT -----  Contact: self  Pt is asking for an return call in reference to questions she has about her apt , please call back at .[phone]  Thx CJ

## 2023-07-05 NOTE — TELEPHONE ENCOUNTER
"Attempted to return patients phone call. Phone call stated that the phone number you have dialed is not in service. Please check the number and try your call again. Attempted x3.      Alyssa Bolden (Allye) [facility]  Rheumatology Department   "

## 2023-07-05 NOTE — TELEPHONE ENCOUNTER
"Returned patients phone call. Patient stated that she was scheduled to have her Prolia injection last week but Dr. Aldana recommended that she reschedule the injection due to her having an inflammed gum for possible infection. Patient stated that she went to the dentist today and the was extracted. She is not on anti biotics and the tooth was not infected. She is scheduled to have Prolia tomorrow and wanted to know if it is still ok to have the injection. Advised patient that Dr. Aldana is already gone for the day but I am going to route this message to him for when he returns to the office in the morning. Patient thanked me for the return phone call and verbalized understanding. All questions answered.       Alyssa Bolden (Allye), [facility]  Rheumatology Department    "

## 2023-07-05 NOTE — TELEPHONE ENCOUNTER
----- Message from Luisito Acosta sent at 7/5/2023  1:32 PM CDT -----  Contact: Iliana  .Type:  Patient Returning Call    Who Called:Iliana   Who Left Message for Patient:nurse   Does the patient know what this is regarding?:appt   Would the patient rather a call back or a response via [facility]? Call   Best Call Back Number:[phone]  Additional Information: please call after 3:30pm patient is about to go in to dental appt.

## 2023-07-05 NOTE — TELEPHONE ENCOUNTER
"Attempted to return patients phone call. Left v/m for patient to call back at earliest convenience.    Alyssa Bolden (Allye), [facility]  Rheumatology Department    "

## 2023-07-06 NOTE — TELEPHONE ENCOUNTER
"After consulting with Dr. Aldana, contacted patient to reschedule Prolia injection for 2 weeks from tooth extraction yesterday to be on the safe side. Prolia injection rescheduled to Wednesday 07/19/2023 at 9:00 at [facility] location. Patient thanked me again for the phone call and verbalized understanding. All questions answered.     Alyssa Bolden (Allye), [facility]  Rheumatology Department   "

## 2023-07-21 ENCOUNTER — INFUSION (OUTPATIENT)
Dept: INFUSION THERAPY | Facility: HOSPITAL | Age: 78
End: 2023-07-21
Attending: INTERNAL MEDICINE
Payer: MEDICARE

## 2023-07-21 VITALS
HEART RATE: 78 BPM | SYSTOLIC BLOOD PRESSURE: 125 MMHG | OXYGEN SATURATION: 97 % | DIASTOLIC BLOOD PRESSURE: 79 MMHG | TEMPERATURE: 97 F | RESPIRATION RATE: 18 BRPM

## 2023-07-21 DIAGNOSIS — M81.0 AGE-RELATED OSTEOPOROSIS WITHOUT CURRENT PATHOLOGICAL FRACTURE: ICD-10-CM

## 2023-07-21 DIAGNOSIS — N18.31 STAGE 3A CHRONIC KIDNEY DISEASE: Primary | ICD-10-CM

## 2023-07-21 PROCEDURE — 63600175 PHARM REV CODE 636 W HCPCS: Mod: JZ,JG,HCNC | Performed by: INTERNAL MEDICINE

## 2023-07-21 PROCEDURE — 96372 THER/PROPH/DIAG INJ SC/IM: CPT | Mod: HCNC

## 2023-07-21 RX ADMIN — DENOSUMAB 60 MG: 60 INJECTION SUBCUTANEOUS at 09:07

## 2023-07-21 NOTE — DISCHARGE INSTRUCTIONS
FALL PREVENTION   Falls often occur due to slipping, tripping or losing your balance. Here are ways to reduce your risk of falling again.   Was there anything that caused your fall that can be fixed, removed or replaced?   Make your home safe by keeping walkways clear of objects you may trip over.   Use non-slip pads under rugs.   Do not walk in poorly lit areas.   Do not stand on chairs or wobbly ladders.   Use caution when reaching overhead or looking upward. This position can cause a loss of balance.   Be sure your shoes fit properly, have non-slip bottoms and are in good condition.   Be cautious when going up and down stairs, curbs, and when walking on uneven sidewalks.   If your balance is poor, consider using a cane or walker.   If your fall was related to alcohol use, stop or limit alcohol intake.   If your fall was related to use of sleeping medicines, talk to your doctor about this. You may need to reduce your dosage at bedtime if you awaken during the night to go to the bathroom.   To reduce the need for nighttime bathroom trips:   Avoid drinking fluids for several hours before going to bed   Empty your bladder before going to bed   Men can keep a urinal at the bedside   © 9824-0498 [facility], [address] [place] [address], [place] [address]. All rights reserved. This information is not intended as a substitute for professional medical care. Always follow your healthcare professional's instructions.

## 2023-07-21 NOTE — PLAN OF CARE
Problem: Adult Inpatient Plan of Care  Goal: Plan of Care Review  Outcome: Ongoing, Progressing  Flowsheets (Taken 7/21/2023 1054)  Plan of Care Reviewed With: patient  Goal: Patient-Specific Goal (Individualized)  Outcome: Ongoing, Progressing  Flowsheets (Taken 7/21/2023 1054)  Anxieties, Fears or Concerns: denies  Individualized Care Needs: feet elevated  Goal: Optimal Comfort and Wellbeing  Outcome: Ongoing, Progressing  Intervention: Monitor Pain and Promote Comfort  Flowsheets (Taken 7/21/2023 1054)  Pain Management Interventions:   quiet environment facilitated   relaxation techniques promoted  Intervention: Provide Person-Centered Care  Flowsheets (Taken 7/21/2023 1054)  Trust Relationship/Rapport:   care explained   questions encouraged   choices provided   reassurance provided   emotional support provided   thoughts/feelings acknowledged   empathic listening provided   questions answered     Problem: Fall Injury Risk  Goal: Absence of Fall and Fall-Related Injury  Outcome: Ongoing, Progressing  Intervention: Identify and Manage Contributors  Flowsheets (Taken 7/21/2023 1054)  Self-Care Promotion: independence encouraged  Medication Review/Management: medications reviewed  Intervention: Promote Injury-Free Environment  Flowsheets (Taken 7/21/2023 1054)  Safety Promotion/Fall Prevention:   nonskid shoes/socks when out of bed   in recliner, wheels locked   medications reviewed

## 2023-07-21 NOTE — NURSING
Patient stated they had their tooth extracted on 7/5/2023. Denies any infection or antibiotic use. Advised to wait 2 weeks from tooth extraction before proceeding with Prolia injection per Dr. Aldana. Ok'd to receive Prolia on 7/21/2023.     Prolia given to the left upper arm without difficulties.Bandaid applied. Patient instructed to stay in the clinic for 15 minutes. Patient verbalized understanding and will notify nurse with any complaints.

## 2023-07-27 ENCOUNTER — LAB VISIT (OUTPATIENT)
Dept: LAB | Facility: HOSPITAL | Age: 78
End: 2023-07-27
Attending: FAMILY MEDICINE
Payer: MEDICARE

## 2023-07-27 DIAGNOSIS — R00.0 RAPID OR IRREGULAR HEARTBEAT: ICD-10-CM

## 2023-07-27 DIAGNOSIS — R79.9 ABNORMAL BLOOD CHEMISTRY LEVEL: ICD-10-CM

## 2023-07-27 DIAGNOSIS — I11.9 BENIGN HYPERTENSIVE HEART DISEASE WITHOUT HEART FAILURE: ICD-10-CM

## 2023-07-27 DIAGNOSIS — E78.5 HYPERLIPIDEMIA, UNSPECIFIED HYPERLIPIDEMIA TYPE: ICD-10-CM

## 2023-07-27 DIAGNOSIS — E03.4 HYPOTHYROIDISM DUE TO ACQUIRED ATROPHY OF THYROID: ICD-10-CM

## 2023-07-27 LAB
ALBUMIN SERPL BCP-MCNC: 3.8 G/DL (ref 3.5–5.2)
ALP SERPL-CCNC: 85 U/L (ref 55–135)
ALT SERPL W/O P-5'-P-CCNC: 17 U/L (ref 10–44)
ANION GAP SERPL CALC-SCNC: 16 MMOL/L (ref 8–16)
AST SERPL-CCNC: 21 U/L (ref 10–40)
BASOPHILS # BLD AUTO: 0.06 K/UL (ref 0–0.2)
BASOPHILS NFR BLD: 0.8 % (ref 0–1.9)
BILIRUB SERPL-MCNC: 0.5 MG/DL (ref 0.1–1)
BUN SERPL-MCNC: 15 MG/DL (ref 8–23)
CALCIUM SERPL-MCNC: 9.8 MG/DL (ref 8.7–10.5)
CHLORIDE SERPL-SCNC: 103 MMOL/L (ref 95–110)
CHOLEST SERPL-MCNC: 186 MG/DL (ref 120–199)
CHOLEST/HDLC SERPL: 2.7 {RATIO} (ref 2–5)
CO2 SERPL-SCNC: 25 MMOL/L (ref 23–29)
CREAT SERPL-MCNC: 0.9 MG/DL (ref 0.5–1.4)
DIFFERENTIAL METHOD: NORMAL
EOSINOPHIL # BLD AUTO: 0.3 K/UL (ref 0–0.5)
EOSINOPHIL NFR BLD: 4.2 % (ref 0–8)
ERYTHROCYTE [DISTWIDTH] IN BLOOD BY AUTOMATED COUNT: 12.9 % (ref 11.5–14.5)
EST. GFR  (NO RACE VARIABLE): >60 ML/MIN/1.73 M^2
ESTIMATED AVG GLUCOSE: 108 MG/DL (ref 68–131)
GLUCOSE SERPL-MCNC: 72 MG/DL (ref 70–110)
HBA1C MFR BLD: 5.4 % (ref 4–5.6)
HCT VFR BLD AUTO: 46.1 % (ref 37–48.5)
HDLC SERPL-MCNC: 69 MG/DL (ref 40–75)
HDLC SERPL: 37.1 % (ref 20–50)
HGB BLD-MCNC: 15 G/DL (ref 12–16)
IMM GRANULOCYTES # BLD AUTO: 0.01 K/UL (ref 0–0.04)
IMM GRANULOCYTES NFR BLD AUTO: 0.1 % (ref 0–0.5)
LDLC SERPL CALC-MCNC: 101.2 MG/DL (ref 63–159)
LYMPHOCYTES # BLD AUTO: 2.5 K/UL (ref 1–4.8)
LYMPHOCYTES NFR BLD: 32 % (ref 18–48)
MCH RBC QN AUTO: 30.7 PG (ref 27–31)
MCHC RBC AUTO-ENTMCNC: 32.5 G/DL (ref 32–36)
MCV RBC AUTO: 95 FL (ref 82–98)
MONOCYTES # BLD AUTO: 0.6 K/UL (ref 0.3–1)
MONOCYTES NFR BLD: 8.1 % (ref 4–15)
NEUTROPHILS # BLD AUTO: 4.3 K/UL (ref 1.8–7.7)
NEUTROPHILS NFR BLD: 54.8 % (ref 38–73)
NONHDLC SERPL-MCNC: 117 MG/DL
NRBC BLD-RTO: 0 /100 WBC
PLATELET # BLD AUTO: 277 K/UL (ref 150–450)
PMV BLD AUTO: 10.7 FL (ref 9.2–12.9)
POTASSIUM SERPL-SCNC: 3.5 MMOL/L (ref 3.5–5.1)
PROT SERPL-MCNC: 7.3 G/DL (ref 6–8.4)
RBC # BLD AUTO: 4.88 M/UL (ref 4–5.4)
SODIUM SERPL-SCNC: 144 MMOL/L (ref 136–145)
T4 FREE SERPL-MCNC: 1.23 NG/DL (ref 0.71–1.51)
TRIGL SERPL-MCNC: 79 MG/DL (ref 30–150)
TSH SERPL DL<=0.005 MIU/L-ACNC: 2.78 UIU/ML (ref 0.4–4)
WBC # BLD AUTO: 7.77 K/UL (ref 3.9–12.7)

## 2023-07-27 PROCEDURE — 36415 COLL VENOUS BLD VENIPUNCTURE: CPT | Mod: HCNC,PN | Performed by: FAMILY MEDICINE

## 2023-07-27 PROCEDURE — 85025 COMPLETE CBC W/AUTO DIFF WBC: CPT | Mod: HCNC | Performed by: FAMILY MEDICINE

## 2023-07-27 PROCEDURE — 80053 COMPREHEN METABOLIC PANEL: CPT | Mod: HCNC | Performed by: FAMILY MEDICINE

## 2023-07-27 PROCEDURE — 83036 HEMOGLOBIN GLYCOSYLATED A1C: CPT | Mod: HCNC | Performed by: FAMILY MEDICINE

## 2023-07-27 PROCEDURE — 84439 ASSAY OF FREE THYROXINE: CPT | Mod: HCNC | Performed by: FAMILY MEDICINE

## 2023-07-27 PROCEDURE — 84443 ASSAY THYROID STIM HORMONE: CPT | Mod: HCNC | Performed by: FAMILY MEDICINE

## 2023-07-27 PROCEDURE — 80061 LIPID PANEL: CPT | Mod: HCNC | Performed by: FAMILY MEDICINE

## 2023-08-01 ENCOUNTER — TELEPHONE (OUTPATIENT)
Dept: PRIMARY CARE CLINIC | Facility: CLINIC | Age: 78
End: 2023-08-01
Payer: MEDICARE

## 2023-08-01 NOTE — TELEPHONE ENCOUNTER
Message left on vm informing patient that Dr Borja next available is in sept if she want sooner appt we can schedule with her NP Jl Gaspar

## 2023-08-01 NOTE — TELEPHONE ENCOUNTER
----- Message from Mary Stevens sent at 8/1/2023  2:20 PM CDT -----  Contact: naren Barrientos  .Type:  Sooner Apoointment Request    Caller is requesting a sooner appointment.  Caller declined first available appointment listed below.  Caller will not accept being placed on the waitlist and is requesting a message be sent to doctor.  Name of Caller: naren Barrientos  When is the first available appointment? 10/2023  Symptoms: Annual  Would the patient rather a call back or a response via [facility]?  Call  Best Call Back Number: .[phone]   Additional Information:

## 2023-08-03 ENCOUNTER — PATIENT MESSAGE (OUTPATIENT)
Dept: RESEARCH | Facility: HOSPITAL | Age: 78
End: 2023-08-03
Payer: MEDICARE

## 2023-08-09 NOTE — PROGRESS NOTES
Iliana,     Your lab results are within recommended goals for your age and conditions. No change in therapy is needed. Please let me know if you have further questions.    Sincerely,   Breonna Borja MD

## 2023-08-23 DIAGNOSIS — E78.5 DYSLIPIDEMIA: ICD-10-CM

## 2023-08-23 NOTE — TELEPHONE ENCOUNTER
Care Due:                  Date            Visit Type   Department     Provider  --------------------------------------------------------------------------------                                ESTABLISHED                              PATIENT -    [facility] PRIMARY  Last Visit: 03-      [facility]           Breonna Borja  Next Visit: None Scheduled  None         None Found                                                            Last  Test          Frequency    Reason                     Performed    Due Date  --------------------------------------------------------------------------------    Office Visit  12 months..  pravastatin..............  03-   03-    [facility] Embedded Care Due Messages. Reference number: [id number].   8/23/2023 12:56:58 PM CDT

## 2023-08-28 RX ORDER — PRAVASTATIN SODIUM 40 MG/1
TABLET ORAL
Qty: 90 TABLET | Refills: 0 | Status: SHIPPED | OUTPATIENT
Start: 2023-08-28

## 2023-09-08 ENCOUNTER — OFFICE VISIT (OUTPATIENT)
Dept: INTERNAL MEDICINE | Facility: CLINIC | Age: 78
End: 2023-09-08
Payer: MEDICARE

## 2023-09-08 VITALS
DIASTOLIC BLOOD PRESSURE: 86 MMHG | OXYGEN SATURATION: 97 % | SYSTOLIC BLOOD PRESSURE: 138 MMHG | HEART RATE: 81 BPM | BODY MASS INDEX: 25.06 KG/M2 | WEIGHT: 150.56 LBS | TEMPERATURE: 97 F

## 2023-09-08 DIAGNOSIS — K44.9 HIATAL HERNIA: Primary | ICD-10-CM

## 2023-09-08 DIAGNOSIS — K21.9 GASTROESOPHAGEAL REFLUX DISEASE, UNSPECIFIED WHETHER ESOPHAGITIS PRESENT: ICD-10-CM

## 2023-09-08 PROCEDURE — 1160F PR REVIEW ALL MEDS BY PRESCRIBER/CLIN PHARMACIST DOCUMENTED: ICD-10-PCS | Mod: HCNC,CPTII,S$GLB, | Performed by: NURSE PRACTITIONER

## 2023-09-08 PROCEDURE — 1101F PR PT FALLS ASSESS DOC 0-1 FALLS W/OUT INJ PAST YR: ICD-10-PCS | Mod: HCNC,CPTII,S$GLB, | Performed by: NURSE PRACTITIONER

## 2023-09-08 PROCEDURE — 99999 PR PBB SHADOW E&M-EST. PATIENT-LVL III: ICD-10-PCS | Mod: PBBFAC,HCNC,, | Performed by: NURSE PRACTITIONER

## 2023-09-08 PROCEDURE — 3288F FALL RISK ASSESSMENT DOCD: CPT | Mod: HCNC,CPTII,S$GLB, | Performed by: NURSE PRACTITIONER

## 2023-09-08 PROCEDURE — 99214 PR OFFICE/OUTPT VISIT, EST, LEVL IV, 30-39 MIN: ICD-10-PCS | Mod: HCNC,S$GLB,, | Performed by: NURSE PRACTITIONER

## 2023-09-08 PROCEDURE — 3079F DIAST BP 80-89 MM HG: CPT | Mod: HCNC,CPTII,S$GLB, | Performed by: NURSE PRACTITIONER

## 2023-09-08 PROCEDURE — 1159F MED LIST DOCD IN RCRD: CPT | Mod: HCNC,CPTII,S$GLB, | Performed by: NURSE PRACTITIONER

## 2023-09-08 PROCEDURE — 3079F PR MOST RECENT DIASTOLIC BLOOD PRESSURE 80-89 MM HG: ICD-10-PCS | Mod: HCNC,CPTII,S$GLB, | Performed by: NURSE PRACTITIONER

## 2023-09-08 PROCEDURE — 1159F PR MEDICATION LIST DOCUMENTED IN MEDICAL RECORD: ICD-10-PCS | Mod: HCNC,CPTII,S$GLB, | Performed by: NURSE PRACTITIONER

## 2023-09-08 PROCEDURE — 3288F PR FALLS RISK ASSESSMENT DOCUMENTED: ICD-10-PCS | Mod: HCNC,CPTII,S$GLB, | Performed by: NURSE PRACTITIONER

## 2023-09-08 PROCEDURE — 3075F SYST BP GE 130 - 139MM HG: CPT | Mod: HCNC,CPTII,S$GLB, | Performed by: NURSE PRACTITIONER

## 2023-09-08 PROCEDURE — 99214 OFFICE O/P EST MOD 30 MIN: CPT | Mod: HCNC,S$GLB,, | Performed by: NURSE PRACTITIONER

## 2023-09-08 PROCEDURE — 99999 PR PBB SHADOW E&M-EST. PATIENT-LVL III: CPT | Mod: PBBFAC,HCNC,, | Performed by: NURSE PRACTITIONER

## 2023-09-08 PROCEDURE — 1160F RVW MEDS BY RX/DR IN RCRD: CPT | Mod: HCNC,CPTII,S$GLB, | Performed by: NURSE PRACTITIONER

## 2023-09-08 PROCEDURE — 1101F PT FALLS ASSESS-DOCD LE1/YR: CPT | Mod: HCNC,CPTII,S$GLB, | Performed by: NURSE PRACTITIONER

## 2023-09-08 PROCEDURE — 3075F PR MOST RECENT SYSTOLIC BLOOD PRESS GE 130-139MM HG: ICD-10-PCS | Mod: HCNC,CPTII,S$GLB, | Performed by: NURSE PRACTITIONER

## 2023-09-08 RX ORDER — FAMOTIDINE 40 MG/1
40 TABLET, FILM COATED ORAL NIGHTLY PRN
Qty: 30 TABLET | Refills: 1 | Status: SHIPPED | OUTPATIENT
Start: 2023-09-08 | End: 2023-11-28

## 2023-09-08 NOTE — PROGRESS NOTES
Subjective:       Patient ID: Iliana Mariano is a 77 y.o. female.    Chief Complaint: Back Pain    Patient here for flare of her gerd  Has known hiatal hernia  On protonix 40mg qam  The last 3 weeks of so has had a few episodes in the afternoons or sometimes middle of the night of pain to upper abdomen shooting through the back. She feels bloated and belches  She takes tums and lifts her arms up and stretches, belches and walks down her miller a few times and symptoms resolve  No cp or shortness of breath  Bowels move fine  No n/v    Back Pain  Associated symptoms include abdominal pain. Pertinent negatives include no chest pain or fever.       /86   Pulse 81   Temp 97.2 °F (36.2 °C)   Wt 68.3 kg (150 lb 9.2 oz)   LMP  (LMP Unknown)   SpO2 97%   BMI 25.06 kg/m²     Review of Systems   Constitutional:  Positive for activity change. Negative for appetite change, chills, diaphoresis, fatigue, fever and unexpected weight change.   HENT: Negative.     Respiratory:  Negative for cough and shortness of breath.    Cardiovascular:  Negative for chest pain, palpitations and leg swelling.   Gastrointestinal:  Positive for abdominal pain.   Genitourinary: Negative.    Musculoskeletal:  Positive for back pain.   Skin:  Negative for color change, pallor, rash and wound.   Allergic/Immunologic: Negative for immunocompromised state.   Neurological: Negative.  Negative for dizziness and facial asymmetry.   Hematological:  Negative for adenopathy. Does not bruise/bleed easily.   Psychiatric/Behavioral:  Negative for agitation, behavioral problems and confusion.        Objective:      Physical Exam  Vitals and nursing note reviewed.   Constitutional:       General: She is not in acute distress.     Appearance: Normal appearance. She is well-developed. She is not diaphoretic.   HENT:      Head: Normocephalic and atraumatic.   Cardiovascular:      Rate and Rhythm: Normal rate and regular rhythm.      Heart sounds: Normal  heart sounds. No murmur heard.  Pulmonary:      Effort: Pulmonary effort is normal. No respiratory distress.      Breath sounds: Normal breath sounds.   Abdominal:      General: Abdomen is flat. Bowel sounds are normal. There is no distension.      Tenderness: There is no abdominal tenderness.   Musculoskeletal:         General: Normal range of motion.   Skin:     General: Skin is warm and dry.      Findings: No rash.   Neurological:      Mental Status: She is alert.   Psychiatric:         Mood and Affect: Mood normal.         Behavior: Behavior normal. Behavior is cooperative.         Thought Content: Thought content normal.         Judgment: Judgment normal.         Assessment:       1. Hiatal hernia    2. Gastroesophageal reflux disease, unspecified whether esophagitis present        Plan:       Iliana was seen today for back pain.    Diagnoses and all orders for this visit:    Hiatal hernia  -     famotidine (PEPCID) 40 MG tablet; Take 1 tablet (40 mg total) by mouth nightly as needed for Heartburn.    Gastroesophageal reflux disease, unspecified whether esophagitis present  -     famotidine (PEPCID) 40 MG tablet; Take 1 tablet (40 mg total) by mouth nightly as needed for Heartburn.    Chronic worsening   Continue protonix qam  Add pepcid hs  Gerd diet  If not improving/worse see gastro to discuss egd  Emergency Room precautions discussed

## 2023-11-06 RX ORDER — METOPROLOL SUCCINATE 25 MG/1
25 TABLET, EXTENDED RELEASE ORAL
Qty: 90 TABLET | Refills: 10 | Status: SHIPPED | OUTPATIENT
Start: 2023-11-06

## 2023-11-28 ENCOUNTER — OFFICE VISIT (OUTPATIENT)
Dept: INTERNAL MEDICINE | Facility: CLINIC | Age: 78
End: 2023-11-28
Payer: MEDICARE

## 2023-11-28 ENCOUNTER — HOSPITAL ENCOUNTER (OUTPATIENT)
Dept: RADIOLOGY | Facility: HOSPITAL | Age: 78
Discharge: HOME | End: 2023-11-28
Attending: NURSE PRACTITIONER
Payer: MEDICARE

## 2023-11-28 VITALS
BODY MASS INDEX: 25.46 KG/M2 | DIASTOLIC BLOOD PRESSURE: 78 MMHG | TEMPERATURE: 98 F | HEART RATE: 86 BPM | SYSTOLIC BLOOD PRESSURE: 138 MMHG | WEIGHT: 153 LBS | OXYGEN SATURATION: 98 %

## 2023-11-28 DIAGNOSIS — M25.512 CHRONIC PAIN OF BOTH SHOULDERS: Primary | ICD-10-CM

## 2023-11-28 DIAGNOSIS — G89.29 CHRONIC PAIN OF BOTH SHOULDERS: ICD-10-CM

## 2023-11-28 DIAGNOSIS — M25.511 CHRONIC PAIN OF BOTH SHOULDERS: Primary | ICD-10-CM

## 2023-11-28 DIAGNOSIS — M79.641 RIGHT HAND PAIN: ICD-10-CM

## 2023-11-28 DIAGNOSIS — G89.29 CHRONIC PAIN OF BOTH SHOULDERS: Primary | ICD-10-CM

## 2023-11-28 DIAGNOSIS — M25.512 CHRONIC PAIN OF BOTH SHOULDERS: ICD-10-CM

## 2023-11-28 DIAGNOSIS — Z23 NEED FOR VACCINATION: ICD-10-CM

## 2023-11-28 DIAGNOSIS — M25.511 CHRONIC PAIN OF BOTH SHOULDERS: ICD-10-CM

## 2023-11-28 PROCEDURE — 3288F FALL RISK ASSESSMENT DOCD: CPT | Mod: HCNC,CPTII,S$GLB, | Performed by: NURSE PRACTITIONER

## 2023-11-28 PROCEDURE — 3288F PR FALLS RISK ASSESSMENT DOCUMENTED: ICD-10-PCS | Mod: HCNC,CPTII,S$GLB, | Performed by: NURSE PRACTITIONER

## 2023-11-28 PROCEDURE — 1101F PR PT FALLS ASSESS DOC 0-1 FALLS W/OUT INJ PAST YR: ICD-10-PCS | Mod: HCNC,CPTII,S$GLB, | Performed by: NURSE PRACTITIONER

## 2023-11-28 PROCEDURE — G0008 FLU VACCINE - QUADRIVALENT - ADJUVANTED: ICD-10-PCS | Mod: HCNC,S$GLB,, | Performed by: NURSE PRACTITIONER

## 2023-11-28 PROCEDURE — 73030 XR SHOULDER COMPLETE 2 OR MORE VIEWS BILATERAL: ICD-10-PCS | Mod: 26,50,HCNC, | Performed by: RADIOLOGY

## 2023-11-28 PROCEDURE — 99214 PR OFFICE/OUTPT VISIT, EST, LEVL IV, 30-39 MIN: ICD-10-PCS | Mod: HCNC,S$GLB,, | Performed by: NURSE PRACTITIONER

## 2023-11-28 PROCEDURE — 99214 OFFICE O/P EST MOD 30 MIN: CPT | Mod: HCNC,S$GLB,, | Performed by: NURSE PRACTITIONER

## 2023-11-28 PROCEDURE — 3075F SYST BP GE 130 - 139MM HG: CPT | Mod: HCNC,CPTII,S$GLB, | Performed by: NURSE PRACTITIONER

## 2023-11-28 PROCEDURE — 90694 VACC AIIV4 NO PRSRV 0.5ML IM: CPT | Mod: HCNC,S$GLB,, | Performed by: NURSE PRACTITIONER

## 2023-11-28 PROCEDURE — 99999 PR PBB SHADOW E&M-EST. PATIENT-LVL IV: ICD-10-PCS | Mod: PBBFAC,HCNC,, | Performed by: NURSE PRACTITIONER

## 2023-11-28 PROCEDURE — 90694 FLU VACCINE - QUADRIVALENT - ADJUVANTED: ICD-10-PCS | Mod: HCNC,S$GLB,, | Performed by: NURSE PRACTITIONER

## 2023-11-28 PROCEDURE — 3075F PR MOST RECENT SYSTOLIC BLOOD PRESS GE 130-139MM HG: ICD-10-PCS | Mod: HCNC,CPTII,S$GLB, | Performed by: NURSE PRACTITIONER

## 2023-11-28 PROCEDURE — 1159F PR MEDICATION LIST DOCUMENTED IN MEDICAL RECORD: ICD-10-PCS | Mod: HCNC,CPTII,S$GLB, | Performed by: NURSE PRACTITIONER

## 2023-11-28 PROCEDURE — G0008 ADMIN INFLUENZA VIRUS VAC: HCPCS | Mod: HCNC,S$GLB,, | Performed by: NURSE PRACTITIONER

## 2023-11-28 PROCEDURE — 1101F PT FALLS ASSESS-DOCD LE1/YR: CPT | Mod: HCNC,CPTII,S$GLB, | Performed by: NURSE PRACTITIONER

## 2023-11-28 PROCEDURE — 1125F AMNT PAIN NOTED PAIN PRSNT: CPT | Mod: HCNC,CPTII,S$GLB, | Performed by: NURSE PRACTITIONER

## 2023-11-28 PROCEDURE — 99999 PR PBB SHADOW E&M-EST. PATIENT-LVL IV: CPT | Mod: PBBFAC,HCNC,, | Performed by: NURSE PRACTITIONER

## 2023-11-28 PROCEDURE — 1160F PR REVIEW ALL MEDS BY PRESCRIBER/CLIN PHARMACIST DOCUMENTED: ICD-10-PCS | Mod: HCNC,CPTII,S$GLB, | Performed by: NURSE PRACTITIONER

## 2023-11-28 PROCEDURE — 73030 X-RAY EXAM OF SHOULDER: CPT | Mod: TC,50,HCNC,FY,PO

## 2023-11-28 PROCEDURE — 3078F PR MOST RECENT DIASTOLIC BLOOD PRESSURE < 80 MM HG: ICD-10-PCS | Mod: HCNC,CPTII,S$GLB, | Performed by: NURSE PRACTITIONER

## 2023-11-28 PROCEDURE — 1125F PR PAIN SEVERITY QUANTIFIED, PAIN PRESENT: ICD-10-PCS | Mod: HCNC,CPTII,S$GLB, | Performed by: NURSE PRACTITIONER

## 2023-11-28 PROCEDURE — 1160F RVW MEDS BY RX/DR IN RCRD: CPT | Mod: HCNC,CPTII,S$GLB, | Performed by: NURSE PRACTITIONER

## 2023-11-28 PROCEDURE — 1159F MED LIST DOCD IN RCRD: CPT | Mod: HCNC,CPTII,S$GLB, | Performed by: NURSE PRACTITIONER

## 2023-11-28 PROCEDURE — 3078F DIAST BP <80 MM HG: CPT | Mod: HCNC,CPTII,S$GLB, | Performed by: NURSE PRACTITIONER

## 2023-11-28 PROCEDURE — 73030 X-RAY EXAM OF SHOULDER: CPT | Mod: 26,50,HCNC, | Performed by: RADIOLOGY

## 2023-11-28 RX ORDER — DENOSUMAB 60 MG/ML
INJECTION SUBCUTANEOUS
COMMUNITY
Start: 2023-07-21

## 2023-11-28 NOTE — PROGRESS NOTES
Subjective:       Patient ID: Iliana Mariano is a 78 y.o. female.    Chief Complaint: Wrist Pain    Patient here for bilateral shoulder pain, ongoing the last several weeks  Also with pain to right hand and swelling  Gets numbness/tingling to hand also  No trauma  But does work with her hands often, does needle work and helps care for   Patsy helps a little      Wrist Pain   Pertinent negatives include no fever.       /78   Pulse 86   Temp 98.1 °F (36.7 °C)   Wt 69.4 kg (153 lb)   LMP  (LMP Unknown)   SpO2 98%   BMI 25.46 kg/m²     Review of Systems   Constitutional:  Positive for activity change. Negative for appetite change, chills, diaphoresis, fatigue, fever and unexpected weight change.   HENT: Negative.     Respiratory:  Negative for cough and shortness of breath.    Cardiovascular:  Negative for chest pain, palpitations and leg swelling.   Gastrointestinal: Negative.    Genitourinary: Negative.    Musculoskeletal:  Positive for arthralgias and joint swelling.   Skin:  Negative for color change, pallor, rash and wound.   Allergic/Immunologic: Negative for immunocompromised state.   Neurological: Negative.  Negative for dizziness and facial asymmetry.   Hematological:  Negative for adenopathy. Does not bruise/bleed easily.   Psychiatric/Behavioral:  Negative for agitation, behavioral problems and confusion.        Objective:      Physical Exam  Constitutional:       General: She is not in acute distress.     Appearance: Normal appearance. She is well-developed. She is not diaphoretic.   HENT:      Head: Normocephalic and atraumatic.      Right Ear: External ear normal.      Left Ear: External ear normal.   Eyes:      General: No scleral icterus.        Right eye: No discharge.         Left eye: No discharge.      Conjunctiva/sclera: Conjunctivae normal.   Cardiovascular:      Rate and Rhythm: Normal rate.   Pulmonary:      Effort: Pulmonary effort is normal. No tachypnea, accessory muscle  usage or respiratory distress.      Breath sounds: No stridor. No wheezing, rhonchi or rales.   Chest:      Chest wall: No tenderness.   Musculoskeletal:      Right shoulder: Tenderness and bony tenderness present. Decreased range of motion.      Left shoulder: Tenderness and bony tenderness present. Decreased range of motion.      Left wrist: Tenderness present. No bony tenderness. Decreased range of motion.      Cervical back: Normal range of motion and neck supple.   Skin:     Findings: No rash.   Neurological:      Mental Status: She is alert. She is not disoriented.   Psychiatric:         Attention and Perception: She is attentive.         Mood and Affect: Mood normal. Mood is not anxious or depressed. Affect is not labile, blunt, angry or inappropriate.         Speech: Speech normal.         Behavior: Behavior normal.         Thought Content: Thought content normal.         Judgment: Judgment normal.         Assessment:       1. Chronic pain of both shoulders    2. Right hand pain    3. Need for vaccination        Plan:       Iliana was seen today for wrist pain.    Diagnoses and all orders for this visit:    Chronic pain of both shoulders  -     Ambulatory referral/consult to Physical/Occupational Therapy; Future  -     X-Ray Shoulder Complete Bilateral; Future    Right hand pain  -     Ambulatory referral/consult to Physical/Occupational Therapy; Future    Need for vaccination  -     Influenza - Quadrivalent (Adjuvanted)      Chronic worsening  Xray today  [facility]er to [facility]  Start tylenol 500mg tablets -  2 tablets for a total of 1,000mg three times a day  The max dose of tylenol in a 24 hours period is 3,000mg  If not improving, refer to ortho

## 2023-11-29 ENCOUNTER — TELEPHONE (OUTPATIENT)
Dept: INTERNAL MEDICINE | Facility: CLINIC | Age: 78
End: 2023-11-29
Payer: MEDICARE

## 2023-11-29 NOTE — TELEPHONE ENCOUNTER
----- Message from Radha Santos sent at 11/29/2023 11:02 AM CST -----  Patient is requesting the referral be sent to [facility] in [place]. Call back at [phone]

## 2024-01-04 ENCOUNTER — TELEPHONE (OUTPATIENT)
Dept: RHEUMATOLOGY | Facility: CLINIC | Age: 79
End: 2024-01-04
Payer: MEDICARE

## 2024-01-04 NOTE — TELEPHONE ENCOUNTER
----- Message from Sylvia Hwang sent at 1/4/2024 12:25 PM CST -----  Contact: Iliana Stevenson is calling to cancel appt on 01/11 due to her having surgery. Pts recovery will be a few months so she will call back to reschedule.                     Thanks  SW

## 2024-02-19 ENCOUNTER — PATIENT MESSAGE (OUTPATIENT)
Dept: PRIMARY CARE CLINIC | Facility: CLINIC | Age: 79
End: 2024-02-19
Payer: MEDICARE

## 2024-03-05 DIAGNOSIS — E03.4 HYPOTHYROIDISM DUE TO ACQUIRED ATROPHY OF THYROID: ICD-10-CM

## 2024-03-05 RX ORDER — LEVOTHYROXINE SODIUM 100 UG/1
100 TABLET ORAL
Qty: 90 TABLET | Refills: 0 | Status: SHIPPED | OUTPATIENT
Start: 2024-03-05 | End: 2024-05-20

## 2024-03-05 NOTE — TELEPHONE ENCOUNTER
No care due was identified.  [facility] Embedded Care Due Messages. Reference number: [id number].   3/05/2024 3:45:11 PM CST

## 2024-03-06 ENCOUNTER — PATIENT MESSAGE (OUTPATIENT)
Dept: PRIMARY CARE CLINIC | Facility: CLINIC | Age: 79
End: 2024-03-06
Payer: MEDICARE

## 2024-04-11 DIAGNOSIS — I11.9 HEART DISEASE, HYPERTENSIVE, BENIGN, WITHOUT HEART FAILURE: ICD-10-CM

## 2024-04-11 DIAGNOSIS — E78.5 DYSLIPIDEMIA: ICD-10-CM

## 2024-04-11 RX ORDER — TRIAMTERENE/HYDROCHLOROTHIAZID 37.5-25 MG
1 TABLET ORAL
Qty: 90 TABLET | Refills: 0 | Status: SHIPPED | OUTPATIENT
Start: 2024-04-11

## 2024-04-11 RX ORDER — PRAVASTATIN SODIUM 40 MG/1
TABLET ORAL
Qty: 90 TABLET | Refills: 0 | Status: SHIPPED | OUTPATIENT
Start: 2024-04-11

## 2024-04-11 NOTE — TELEPHONE ENCOUNTER
No care due was identified.  [facility] Embedded Care Due Messages. Reference number: [id number].   4/11/2024 11:01:50 AM CDT

## 2024-05-18 NOTE — TELEPHONE ENCOUNTER
----- Message from Rhianna Mancia sent at 6/27/2022  8:31 AM CDT -----  Contact: [phone]  Pt says that she is having an issue with high blood pressure (151/101 p 102) and a high pulse rate. She would like to know if she can be seen by this provider today if at all possible. Please Advise      no

## 2024-07-31 ENCOUNTER — PATIENT MESSAGE (OUTPATIENT)
Dept: RESEARCH | Facility: HOSPITAL | Age: 79
End: 2024-07-31
Payer: MEDICARE

## 2024-08-01 ENCOUNTER — PATIENT MESSAGE (OUTPATIENT)
Dept: PRIMARY CARE CLINIC | Facility: CLINIC | Age: 79
End: 2024-08-01
Payer: MEDICARE

## 2024-08-01 DIAGNOSIS — E03.4 HYPOTHYROIDISM DUE TO ACQUIRED ATROPHY OF THYROID: ICD-10-CM

## 2024-08-01 RX ORDER — LEVOTHYROXINE SODIUM 100 UG/1
100 TABLET ORAL
Qty: 90 TABLET | Refills: 0 | Status: SHIPPED | OUTPATIENT
Start: 2024-08-01

## 2024-08-01 NOTE — TELEPHONE ENCOUNTER
Care Due:                  Date            Visit Type   Department     Provider  --------------------------------------------------------------------------------    Last Visit: None Found      None         None Found  Next Visit: None Scheduled  None         None Found                                                            Last  Test          Frequency    Reason                     Performed    Due Date  --------------------------------------------------------------------------------    Office Visit  15 months..  pravastatin,               Not Found    Overdue                             triamterene-hydrochloroth                             iazide...................    CMP.........  12 months..  pravastatin,               07- 07-                             triamterene-hydrochloroth                             iazide...................    Lipid Panel.  12 months..  pravastatin..............  07- 07-    [facility] Embedded Care Due Messages. Reference number: [id number].   8/01/2024 1:09:36 AM CDT

## 2024-11-19 ENCOUNTER — PATIENT MESSAGE (OUTPATIENT)
Dept: ADMINISTRATIVE | Facility: HOSPITAL | Age: 79
End: 2024-11-19
Payer: MEDICARE

## 2024-11-20 ENCOUNTER — PATIENT OUTREACH (OUTPATIENT)
Dept: ADMINISTRATIVE | Facility: HOSPITAL | Age: 79
End: 2024-11-20
Payer: MEDICARE

## 2024-12-18 ENCOUNTER — PATIENT MESSAGE (OUTPATIENT)
Dept: PRIMARY CARE CLINIC | Facility: CLINIC | Age: 79
End: 2024-12-18
Payer: MEDICARE

## 2024-12-18 DIAGNOSIS — E03.4 HYPOTHYROIDISM DUE TO ACQUIRED ATROPHY OF THYROID: ICD-10-CM

## 2024-12-18 RX ORDER — LEVOTHYROXINE SODIUM 100 UG/1
100 TABLET ORAL
Qty: 90 TABLET | Refills: 3 | OUTPATIENT
Start: 2024-12-18

## 2025-01-02 ENCOUNTER — PATIENT OUTREACH (OUTPATIENT)
Dept: ADMINISTRATIVE | Facility: HOSPITAL | Age: 80
End: 2025-01-02
Payer: MEDICARE

## 2025-02-09 DIAGNOSIS — I11.9 HEART DISEASE, HYPERTENSIVE, BENIGN, WITHOUT HEART FAILURE: ICD-10-CM

## 2025-02-10 RX ORDER — METOPROLOL SUCCINATE 25 MG/1
25 TABLET, EXTENDED RELEASE ORAL DAILY
Qty: 90 TABLET | Refills: 0 | Status: SHIPPED | OUTPATIENT
Start: 2025-02-10

## 2025-02-10 RX ORDER — TRIAMTERENE/HYDROCHLOROTHIAZID 37.5-25 MG
1 TABLET ORAL DAILY
Qty: 90 TABLET | Refills: 0 | Status: SHIPPED | OUTPATIENT
Start: 2025-02-10

## 2025-02-24 DIAGNOSIS — Z00.00 ENCOUNTER FOR MEDICARE ANNUAL WELLNESS EXAM: ICD-10-CM

## 2025-03-04 ENCOUNTER — NURSE TRIAGE (OUTPATIENT)
Dept: ADMINISTRATIVE | Facility: CLINIC | Age: 80
End: 2025-03-04
Payer: MEDICARE

## 2025-03-04 DIAGNOSIS — I11.9 HEART DISEASE, HYPERTENSIVE, BENIGN, WITHOUT HEART FAILURE: Primary | ICD-10-CM

## 2025-03-04 DIAGNOSIS — R94.31 ABNORMAL EKG: ICD-10-CM

## 2025-03-04 NOTE — TELEPHONE ENCOUNTER
Reason for Disposition   [1] Systolic BP  >= 130 OR Diastolic >= 80 AND [2] taking BP medications   Taking a medicine that could cause dizziness (e.g., blood pressure medications, diuretics)    Additional Information   Negative: Difficult to awaken or acting confused (e.g., disoriented, slurred speech)   Negative: SEVERE difficulty breathing (e.g., struggling for each breath, speaks in single words)   Negative: [1] Weakness of the face, arm or leg on one side of the body AND [2] new-onset   Negative: [1] Numbness (i.e., loss of sensation) of the face, arm or leg on one side of the body AND [2] new-onset   Negative: [1] Chest pain lasts > 5 minutes AND [2] history of heart disease (i.e., heart attack, bypass surgery, angina, angioplasty, CHF)   Negative: [1] Chest pain AND [2] took nitroglycerin AND [3] pain was not relieved   Negative: Sounds like a life-threatening emergency to the triager   Negative: [1] Systolic BP  >= 160 OR Diastolic >= 100 AND [2] cardiac (e.g., breathing difficulty, chest pain) or neurologic symptoms (e.g., new-onset blurred or double vision, unsteady gait)   Negative: [1] Systolic BP  >= 200 OR Diastolic >= 120 AND [2] having NO cardiac or neurologic symptoms   Negative: [1] Systolic BP  >= 180 OR Diastolic >= 110 AND [2] missed most recent dose of blood pressure medication   Negative: Systolic BP  >= 180 OR Diastolic >= 110   Negative: Ran out of BP medications   Negative: Systolic BP  >= 160 OR Diastolic >= 100   Negative: [1] Taking BP medications AND [2] feels is having side effects (e.g., impotence, cough, dizzy upon standing)   Negative: SEVERE difficulty breathing (e.g., struggling for each breath, speaks in single words)   Negative: [1] Difficulty breathing or swallowing AND [2] started suddenly after medicine, an allergic food or bee sting   Negative: Shock suspected (e.g., cold/pale/clammy skin, too weak to stand, low BP, rapid pulse)   Negative: Difficult to awaken or acting  "confused (e.g., disoriented, slurred speech)   Negative: [1] Weakness (i.e., paralysis, loss of muscle strength) of the face, arm or leg on one side of the body AND [2] sudden onset AND [3] present now   Negative: [1] Numbness (i.e., loss of sensation) of the face, arm or leg on one side of the body AND [2] sudden onset AND [3] present now   Negative: [1] Loss of speech or garbled speech AND [2] sudden onset AND [3] present now   Negative: Overdose (accidental or intentional) of medications   Negative: [1] Fainted > 15 minutes ago AND [2] still feels too weak or dizzy to stand   Negative: Heart beating < 50 beats per minute OR > 140 beats per minute   Negative: Sounds like a life-threatening emergency to the triager   Negative: Difficulty breathing   Negative: SEVERE dizziness (e.g., unable to stand, requires support to walk, feels like passing out now)   Negative: Extra heartbeats, irregular heart beating, or heart is beating very fast  (i.e., "palpitations")   Negative: [1] Drinking very little AND [2] dehydration suspected (e.g., no urine > 12 hours, very dry mouth, very lightheaded)   Negative: [1] Weakness (i.e., paralysis, loss of muscle strength) of the face, arm / hand, or leg / foot on one side of the body AND [2] sudden onset AND [3] brief (now gone)   Negative: [1] Numbness (i.e., loss of sensation) of the face, arm / hand, or leg / foot on one side of the body AND [2] sudden onset AND [3] brief (now gone)   Negative: [1] Loss of speech or garbled speech AND [2] sudden onset AND [3] brief (now gone)   Negative: Loss of vision or double vision  (Exception: Similar to previous migraines.)   Negative: Patient sounds very sick or weak to the triager   Negative: [1] Dizziness caused by heat exposure, sudden standing, or poor fluid intake AND [2] no improvement after 2 hours of rest and fluids   Negative: [1] Fever > 103 F (39.4 C) AND [2] not able to get the fever down using Fever Care Advice   Negative: [1] " Fever > 101 F (38.3 C) AND [2] age > 60 years   Negative: [1] Fever > 100 F (37.8 C) AND [2] bedridden (e.g., CVA, chronic illness, recovering from surgery)   Negative: [1] Fever > 100 F (37.8 C) AND [2] diabetes mellitus or weak immune system (e.g., HIV positive, cancer chemo, splenectomy, organ transplant, chronic steroids)   Negative: [1] MODERATE dizziness (e.g., interferes with normal activities) AND [2] has NOT been evaluated by doctor (or NP/PA) for this  (Exception: Dizziness caused by heat exposure, sudden standing, or poor fluid intake.)   Negative: Fever present > 3 days (72 hours)    Protocols used: Blood Pressure - High-A-AH, Dizziness - [id number]-RX  Pt states her BP was 152/124  and she felt lightheaded. She took her metoprolol and her BP is now 144/75 HR 85. Advised to see a Healthcare Provider within 24 hr. Pt refused an offer to see a different Provider sooner and states she will see Dr. Diehl Thursday. Instructed to call OOC back if new/worsening symptoms develop. Pt verbalized understanding.

## 2025-03-06 ENCOUNTER — HOSPITAL ENCOUNTER (OUTPATIENT)
Dept: CARDIOLOGY | Facility: HOSPITAL | Age: 80
Discharge: HOME | End: 2025-03-06
Attending: INTERNAL MEDICINE
Payer: MEDICARE

## 2025-03-06 ENCOUNTER — OFFICE VISIT (OUTPATIENT)
Dept: CARDIOLOGY | Facility: CLINIC | Age: 80
End: 2025-03-06
Payer: MEDICARE

## 2025-03-06 VITALS
HEART RATE: 88 BPM | SYSTOLIC BLOOD PRESSURE: 138 MMHG | BODY MASS INDEX: 24.71 KG/M2 | DIASTOLIC BLOOD PRESSURE: 88 MMHG | WEIGHT: 148.5 LBS | OXYGEN SATURATION: 97 %

## 2025-03-06 DIAGNOSIS — R94.31 ABNORMAL EKG: ICD-10-CM

## 2025-03-06 DIAGNOSIS — R00.2 PALPITATION: ICD-10-CM

## 2025-03-06 DIAGNOSIS — N18.31 STAGE 3A CHRONIC KIDNEY DISEASE: ICD-10-CM

## 2025-03-06 DIAGNOSIS — E03.4 HYPOTHYROIDISM DUE TO ACQUIRED ATROPHY OF THYROID: ICD-10-CM

## 2025-03-06 DIAGNOSIS — R42 DIZZINESS: ICD-10-CM

## 2025-03-06 DIAGNOSIS — R73.09 ABNORMAL GLUCOSE: ICD-10-CM

## 2025-03-06 DIAGNOSIS — I49.9 SUPRAVENTRICULAR DYSRHYTHMIA: ICD-10-CM

## 2025-03-06 DIAGNOSIS — E78.5 DYSLIPIDEMIA: ICD-10-CM

## 2025-03-06 DIAGNOSIS — I11.9 BENIGN HYPERTENSIVE HEART DISEASE WITHOUT CONGESTIVE HEART FAILURE: Primary | ICD-10-CM

## 2025-03-06 DIAGNOSIS — I11.9 HEART DISEASE, HYPERTENSIVE, BENIGN, WITHOUT HEART FAILURE: ICD-10-CM

## 2025-03-06 LAB
OHS QRS DURATION: 102 MS
OHS QTC CALCULATION: 454 MS

## 2025-03-06 PROCEDURE — 1160F RVW MEDS BY RX/DR IN RCRD: CPT | Mod: CPTII,S$GLB,, | Performed by: INTERNAL MEDICINE

## 2025-03-06 PROCEDURE — 1101F PT FALLS ASSESS-DOCD LE1/YR: CPT | Mod: CPTII,S$GLB,, | Performed by: INTERNAL MEDICINE

## 2025-03-06 PROCEDURE — 3079F DIAST BP 80-89 MM HG: CPT | Mod: CPTII,S$GLB,, | Performed by: INTERNAL MEDICINE

## 2025-03-06 PROCEDURE — 1126F AMNT PAIN NOTED NONE PRSNT: CPT | Mod: CPTII,S$GLB,, | Performed by: INTERNAL MEDICINE

## 2025-03-06 PROCEDURE — 99999 PR PBB SHADOW E&M-EST. PATIENT-LVL III: CPT | Mod: PBBFAC,,, | Performed by: INTERNAL MEDICINE

## 2025-03-06 PROCEDURE — 1159F MED LIST DOCD IN RCRD: CPT | Mod: CPTII,S$GLB,, | Performed by: INTERNAL MEDICINE

## 2025-03-06 PROCEDURE — 99213 OFFICE O/P EST LOW 20 MIN: CPT | Mod: S$GLB,,, | Performed by: INTERNAL MEDICINE

## 2025-03-06 PROCEDURE — 93005 ELECTROCARDIOGRAM TRACING: CPT

## 2025-03-06 PROCEDURE — 93010 ELECTROCARDIOGRAM REPORT: CPT | Mod: ,,, | Performed by: INTERNAL MEDICINE

## 2025-03-06 PROCEDURE — 3288F FALL RISK ASSESSMENT DOCD: CPT | Mod: CPTII,S$GLB,, | Performed by: INTERNAL MEDICINE

## 2025-03-06 PROCEDURE — 3075F SYST BP GE 130 - 139MM HG: CPT | Mod: CPTII,S$GLB,, | Performed by: INTERNAL MEDICINE

## 2025-03-06 RX ORDER — KETOCONAZOLE 20 MG/G
CREAM TOPICAL
COMMUNITY
Start: 2025-02-26 | End: 2026-02-26

## 2025-03-06 RX ORDER — AMLODIPINE BESYLATE 2.5 MG/1
2.5 TABLET ORAL DAILY
Qty: 90 TABLET | Refills: 3 | Status: SHIPPED | OUTPATIENT
Start: 2025-03-06 | End: 2026-03-06

## 2025-03-06 NOTE — PROGRESS NOTES
Subjective:   Patient ID:  Iliana Mariano is a 79 y.o. female who presents for follow up of Headache (Dull ache)      HPI  8/19/2021   A 74 YO FEMALE REFERRED FROM DR DWYER because of recurrent sudden episode of feeling dizziness lightheadedness feeling funny disoriented and had to sit down abort what she is doing could not feel  Her pulse felt racing irregular could not count it. Had a total of 2 episodes. Her ekg today ivcd of the lbbb type. No ischemic changes her ekg is unchanged since 2013. Has been feeling tired lately no nap during the day. She sleeps well at nite no snoring. No tia claudication no chest pain no shortness of breath has feeling of imbalance while walking no focal weakness. Has a cardiolite in 2013 was negative. she is fairly active. Carotid stent pending   Strong fh cad. She was placed on baby asa ec.     9/17/2021  Her efor f/u test results still feels tired . No further dizziness lightheadedness     12/17/2021 Tuesday after thanksgiving she got up felt funny  Lightheaded felt her heart unusual ? Fast . Had another episode last week. She monitored bp it was elevated . Her bp fluctuiated. She was tired . Her rt foot goes numb when she sits down. Has no other issues clinically      1/6/2023   Gets episodes of lightheadedness dizziness feels like she is going to pass out. Her bp fluctuates and her heart rate fluctuates with it has no syncope has no fall no chest pain.    no change in her meds. She drinks 1 cup of coffee in the morning.     5/17/2023     Has some ankle swelling. Her heart rate still variable. Has no cardiac symptoms she gets tired.  Worse  ankle swelling more with end of day?     3/6/2025   Has no leg swelling   Her bp is elevated has no symptoms cardiac wise she is active. Her bp reached 154/124. Had headache with it had some head heaviness she is compliant with salt  . Symptoms resolve when she takes her meds.   Stays well hydrated.   Past Medical History:   Diagnosis Date     Gastritis     GERD (gastroesophageal reflux disease)     Glaucoma suspect with open angle 5-14-13    Hemorrhoid     Hiatal hernia     Hypertension 6/7/2013    Osteoarthritis 2011    Osteoporosis, unspecified     Other and unspecified hyperlipidemia     Unspecified essential hypertension     Unspecified hypothyroidism     Unspecified vitamin D deficiency        Past Surgical History:   Procedure Laterality Date    COLONOSCOPY N/A 02/10/2017    Procedure: COLONOSCOPY;  Surgeon: Alonzo Waterman MD;  Location: [facility];  Service: Endoscopy;  Laterality: N/A;    HYSTERECTOMY  1990    OOPHORECTOMY  1990    SLT OD  06/06/2013    SLT OS  08/15/2013       Social History[1]    Family History   Problem Relation Name Age of Onset    Glaucoma Mother Madison     Cataracts Mother Madison     Diabetes Mother Madison     Hypertension Mother Madison     Stroke Mother Madison     Cataracts Father      Diabetes Sister Imer     Hypertension Sister Imer     Diabetes Brother Ovi     Diabetes Maternal Grandfather Areli     Blindness Maternal Grandfather Areli     Thyroid disease Paternal Grandmother      Ovarian cancer Other Niece     Breast cancer Paternal Aunt      Strabismus Neg Hx      Retinal detachment Neg Hx      Macular degeneration Neg Hx      Amblyopia Neg Hx         Current Outpatient Medications   Medication Sig    aspirin (ECOTRIN) 81 MG EC tablet Take 81 mg by mouth once daily.    ergocalciferol, vitamin D2, 1,000 unit Cap Take 1,000 Units by mouth.    ketoconazole (NIZORAL) 2 % cream Apply topically.    latanoprost 0.005 % ophthalmic solution Place 1 drop into both eyes every evening.    levothyroxine (SYNTHROID) 100 MCG tablet TAKE 1 TABLET BEFORE BREAKFAST.    metoprolol succinate (TOPROL-XL) 25 MG 24 hr tablet Take 1 tablet (25 mg total) by mouth once daily.    pantoprazole (PROTONIX) 40 MG tablet TAKE 1 TABLET EVERY DAY    pravastatin (PRAVACHOL) 40 MG tablet TAKE 1 TABLET EVERY DAY    triamterene-hydrochlorothiazide  37.5-25 mg (MAXZIDE-25) 37.5-25 mg per tablet Take 1 tablet by mouth once daily.    PROLIA 60 mg/mL Syrg  (Patient not taking: Reported on 3/6/2025)     No current facility-administered medications for this visit.     Current Outpatient Medications on File Prior to Visit   Medication Sig    aspirin (ECOTRIN) 81 MG EC tablet Take 81 mg by mouth once daily.    ergocalciferol, vitamin D2, 1,000 unit Cap Take 1,000 Units by mouth.    ketoconazole (NIZORAL) 2 % cream Apply topically.    latanoprost 0.005 % ophthalmic solution Place 1 drop into both eyes every evening.    levothyroxine (SYNTHROID) 100 MCG tablet TAKE 1 TABLET BEFORE BREAKFAST.    metoprolol succinate (TOPROL-XL) 25 MG 24 hr tablet Take 1 tablet (25 mg total) by mouth once daily.    pantoprazole (PROTONIX) 40 MG tablet TAKE 1 TABLET EVERY DAY    pravastatin (PRAVACHOL) 40 MG tablet TAKE 1 TABLET EVERY DAY    triamterene-hydrochlorothiazide 37.5-25 mg (MAXZIDE-25) 37.5-25 mg per tablet Take 1 tablet by mouth once daily.    PROLIA 60 mg/mL Syrg  (Patient not taking: Reported on 3/6/2025)     No current facility-administered medications on file prior to visit.     Review of patient's allergies indicates:   Allergen Reactions    Sulfa (sulfonamide antibiotics) Hives        Review of Systems   Constitutional: Negative for diaphoresis, malaise/fatigue and weight gain.   HENT:  Negative for hoarse voice.    Eyes:  Negative for double vision and visual disturbance.   Cardiovascular:  Negative for chest pain, claudication, cyanosis, dyspnea on exertion, irregular heartbeat, leg swelling, near-syncope, orthopnea, palpitations, paroxysmal nocturnal dyspnea and syncope.   Respiratory:  Negative for cough, hemoptysis, shortness of breath and snoring.    Hematologic/Lymphatic: Negative for bleeding problem. Does not bruise/bleed easily.   Skin:  Negative for color change and poor wound healing.   Musculoskeletal:  Negative for muscle cramps, muscle weakness and  myalgias.   Gastrointestinal:  Negative for bloating, abdominal pain, change in bowel habit, diarrhea, heartburn, hematemesis, hematochezia, melena and nausea.   Neurological:  Positive for headaches. Negative for excessive daytime sleepiness, dizziness, light-headedness, loss of balance, numbness and weakness.   Psychiatric/Behavioral:  Negative for memory loss. The patient does not have insomnia.    Allergic/Immunologic: Negative for hives.       Objective:   Physical Exam  Vitals and nursing note reviewed.   Constitutional:       General: She is not in acute distress.     Appearance: Normal appearance. She is well-developed. She is not ill-appearing.   HENT:      Head: Normocephalic and atraumatic.   Eyes:      General: No scleral icterus.     Pupils: Pupils are equal, round, and reactive to light.   Neck:      Thyroid: No thyromegaly.      Vascular: Normal carotid pulses. No carotid bruit, hepatojugular reflux or JVD.      Trachea: No tracheal deviation.   Cardiovascular:      Rate and Rhythm: Normal rate and regular rhythm.      Pulses: Normal pulses.      Heart sounds: Normal heart sounds. No murmur heard.     No friction rub. No gallop.   Pulmonary:      Effort: Pulmonary effort is normal. No respiratory distress.      Breath sounds: Normal breath sounds. No wheezing, rhonchi or rales.   Chest:      Chest wall: No tenderness.   Abdominal:      General: Bowel sounds are normal. There is no abdominal bruit.      Palpations: Abdomen is soft. There is no hepatomegaly or pulsatile mass.      Tenderness: There is no abdominal tenderness.   Musculoskeletal:      Right shoulder: No deformity.      Cervical back: Normal range of motion and neck supple.      Right lower leg: No edema.      Left lower leg: No edema.   Skin:     General: Skin is warm and dry.      Findings: No erythema or rash.      Nails: There is no clubbing.   Neurological:      General: No focal deficit present.      Mental Status: She is alert and  "oriented to person, place, and time.      Cranial Nerves: No cranial nerve deficit.      Coordination: Coordination normal.   Psychiatric:         Mood and Affect: Mood normal.         Speech: Speech normal.         Behavior: Behavior normal.       Vitals:    03/06/25 0910 03/06/25 0914   BP: 134/78 138/88   BP Location: Right arm Left arm   Patient Position: Sitting Sitting   Pulse: 88    SpO2: 97%    Weight: 67.4 kg (148 lb 7.7 oz)      Lab Results   Component Value Date    CHOL 186 07/27/2023    CHOL 173 09/13/2021    CHOL 195 09/17/2020      Body mass index is 24.71 kg/m².   Lab Results   Component Value Date    HGBA1C 5.6 05/17/2024      BMP  Lab Results   Component Value Date     07/27/2023    K 3.5 07/27/2023     07/27/2023    CO2 25 07/27/2023    BUN 15 07/27/2023    CREATININE 0.9 07/27/2023    CALCIUM 9.8 07/27/2023    ANIONGAP 16 07/27/2023    EGFRNORACEVR >60.0 07/27/2023      Lab Results   Component Value Date    HDL 69 07/27/2023    HDL 68 09/13/2021    HDL 71 09/17/2020     Lab Results   Component Value Date    LDLCALC 101.2 07/27/2023    LDLCALC 89.4 09/13/2021    LDLCALC 107.6 09/17/2020     Lab Results   Component Value Date    TRIG 79 07/27/2023    TRIG 78 09/13/2021    TRIG 82 09/17/2020     Lab Results   Component Value Date    CHOLHDL 37.1 07/27/2023    CHOLHDL 39.3 09/13/2021    CHOLHDL 36.4 09/17/2020       Chemistry        Component Value Date/Time     07/27/2023 0708    K 3.5 07/27/2023 0708     07/27/2023 0708    CO2 25 07/27/2023 0708    BUN 15 07/27/2023 0708    CREATININE 0.9 07/27/2023 0708    GLU 72 07/27/2023 0708        Component Value Date/Time    CALCIUM 9.8 07/27/2023 0708    ALKPHOS 85 07/27/2023 0708    AST 21 07/27/2023 0708    ALT 17 07/27/2023 0708    BILITOT 0.5 07/27/2023 0708    ESTGFRAFRICA >60.0 08/13/2021 0807    EGFRNONAA >60.0 08/13/2021 0807          Lab Results   Component Value Date    TSH 0.77 05/17/2024     No results found for: "INR", " ""PROTIME"  Lab Results   Component Value Date    WBC 7.77 07/27/2023    HGB 15.0 07/27/2023    HCT 46.1 07/27/2023    MCV 95 07/27/2023     07/27/2023     BMP  Sodium   Date Value Ref Range Status   07/27/2023 144 136 - 145 mmol/L Final     Potassium   Date Value Ref Range Status   07/27/2023 3.5 3.5 - 5.1 mmol/L Final     Chloride   Date Value Ref Range Status   07/27/2023 103 95 - 110 mmol/L Final     CO2   Date Value Ref Range Status   07/27/2023 25 23 - 29 mmol/L Final     BUN   Date Value Ref Range Status   07/27/2023 15 8 - 23 mg/dL Final     Creatinine   Date Value Ref Range Status   07/27/2023 0.9 0.5 - 1.4 mg/dL Final     Calcium   Date Value Ref Range Status   07/27/2023 9.8 8.7 - 10.5 mg/dL Final     Anion Gap   Date Value Ref Range Status   07/27/2023 16 8 - 16 mmol/L Final     eGFR if    Date Value Ref Range Status   08/13/2021 >60.0 >60 mL/min/1.73 m^2 Final     eGFR if non    Date Value Ref Range Status   08/13/2021 >60.0 >60 mL/min/1.73 m^2 Final     Comment:     Calculation used to obtain the estimated glomerular filtration  rate (eGFR) is the CKD-EPI equation.        CrCl cannot be calculated (Patient's most recent lab result is older than the maximum 7 days allowed.).    Assessment:     1. Benign hypertensive heart disease without congestive heart failure    2. Hypothyroidism due to acquired atrophy of thyroid    3. Dyslipidemia    4. Palpitation    5. Dizziness    6. Abnormal EKG    7. Supraventricular dysrhythmia    8. Abnormal glucose    9. Stage 3a chronic kidney disease      Has symptomatic htn with headache compliant patient will benefit from adding low dose amlodipine 2.5 mg po every evening  Plan:   Amlodipine 2.5 mg every evening    Low salt diet    Record bp chart   1 month f/u             [1]   Social History  Tobacco Use    Smoking status: Never    Smokeless tobacco: Never   Substance Use Topics    Alcohol use: No    Drug use: No     "

## 2025-04-05 DIAGNOSIS — I11.9 HEART DISEASE, HYPERTENSIVE, BENIGN, WITHOUT HEART FAILURE: ICD-10-CM

## 2025-04-07 RX ORDER — TRIAMTERENE/HYDROCHLOROTHIAZID 37.5-25 MG
TABLET ORAL
Qty: 90 TABLET | Refills: 3 | Status: SHIPPED | OUTPATIENT
Start: 2025-04-07

## 2025-04-07 RX ORDER — METOPROLOL SUCCINATE 25 MG/1
TABLET, EXTENDED RELEASE ORAL
Qty: 90 TABLET | Refills: 3 | Status: SHIPPED | OUTPATIENT
Start: 2025-04-07

## 2025-04-14 ENCOUNTER — OFFICE VISIT (OUTPATIENT)
Dept: CARDIOLOGY | Facility: CLINIC | Age: 80
End: 2025-04-14
Payer: MEDICARE

## 2025-04-14 ENCOUNTER — HOSPITAL ENCOUNTER (OUTPATIENT)
Dept: CARDIOLOGY | Facility: HOSPITAL | Age: 80
Discharge: HOME | End: 2025-04-14
Attending: INTERNAL MEDICINE
Payer: MEDICARE

## 2025-04-14 VITALS
WEIGHT: 147.5 LBS | BODY MASS INDEX: 25.18 KG/M2 | HEIGHT: 64 IN | HEART RATE: 67 BPM | DIASTOLIC BLOOD PRESSURE: 78 MMHG | SYSTOLIC BLOOD PRESSURE: 138 MMHG

## 2025-04-14 DIAGNOSIS — R00.2 PALPITATION: ICD-10-CM

## 2025-04-14 DIAGNOSIS — R60.9 EDEMA, UNSPECIFIED TYPE: ICD-10-CM

## 2025-04-14 DIAGNOSIS — R94.31 ABNORMAL EKG: ICD-10-CM

## 2025-04-14 DIAGNOSIS — R42 DIZZINESS: ICD-10-CM

## 2025-04-14 DIAGNOSIS — E78.5 DYSLIPIDEMIA: Primary | ICD-10-CM

## 2025-04-14 DIAGNOSIS — M79.89 LEG SWELLING: ICD-10-CM

## 2025-04-14 DIAGNOSIS — E03.4 HYPOTHYROIDISM DUE TO ACQUIRED ATROPHY OF THYROID: ICD-10-CM

## 2025-04-14 DIAGNOSIS — N18.31 STAGE 3A CHRONIC KIDNEY DISEASE: ICD-10-CM

## 2025-04-14 PROCEDURE — 1126F AMNT PAIN NOTED NONE PRSNT: CPT | Mod: HCNC,CPTII,S$GLB, | Performed by: INTERNAL MEDICINE

## 2025-04-14 PROCEDURE — 99214 OFFICE O/P EST MOD 30 MIN: CPT | Mod: HCNC,S$GLB,, | Performed by: INTERNAL MEDICINE

## 2025-04-14 PROCEDURE — 3078F DIAST BP <80 MM HG: CPT | Mod: HCNC,CPTII,S$GLB, | Performed by: INTERNAL MEDICINE

## 2025-04-14 PROCEDURE — 1160F RVW MEDS BY RX/DR IN RCRD: CPT | Mod: HCNC,CPTII,S$GLB, | Performed by: INTERNAL MEDICINE

## 2025-04-14 PROCEDURE — 99999 PR PBB SHADOW E&M-EST. PATIENT-LVL III: CPT | Mod: PBBFAC,HCNC,, | Performed by: INTERNAL MEDICINE

## 2025-04-14 PROCEDURE — 1159F MED LIST DOCD IN RCRD: CPT | Mod: HCNC,CPTII,S$GLB, | Performed by: INTERNAL MEDICINE

## 2025-04-14 PROCEDURE — 1101F PT FALLS ASSESS-DOCD LE1/YR: CPT | Mod: HCNC,CPTII,S$GLB, | Performed by: INTERNAL MEDICINE

## 2025-04-14 PROCEDURE — 3075F SYST BP GE 130 - 139MM HG: CPT | Mod: HCNC,CPTII,S$GLB, | Performed by: INTERNAL MEDICINE

## 2025-04-14 PROCEDURE — 3288F FALL RISK ASSESSMENT DOCD: CPT | Mod: HCNC,CPTII,S$GLB, | Performed by: INTERNAL MEDICINE

## 2025-04-14 RX ORDER — METOPROLOL SUCCINATE 25 MG/1
25 TABLET, EXTENDED RELEASE ORAL NIGHTLY
Qty: 90 TABLET | Refills: 3 | Status: SHIPPED | OUTPATIENT
Start: 2025-04-14

## 2025-04-30 ENCOUNTER — RESULTS FOLLOW-UP (OUTPATIENT)
Dept: CARDIOLOGY | Facility: CLINIC | Age: 80
End: 2025-04-30

## 2025-08-04 ENCOUNTER — OFFICE VISIT (OUTPATIENT)
Dept: CARDIOLOGY | Facility: CLINIC | Age: 80
End: 2025-08-04
Payer: MEDICARE

## 2025-08-04 VITALS
SYSTOLIC BLOOD PRESSURE: 122 MMHG | WEIGHT: 148.13 LBS | DIASTOLIC BLOOD PRESSURE: 70 MMHG | HEART RATE: 80 BPM | BODY MASS INDEX: 25.43 KG/M2

## 2025-08-04 DIAGNOSIS — R00.2 PALPITATION: Primary | ICD-10-CM

## 2025-08-04 DIAGNOSIS — R60.9 EDEMA, UNSPECIFIED TYPE: ICD-10-CM

## 2025-08-04 DIAGNOSIS — E03.4 HYPOTHYROIDISM DUE TO ACQUIRED ATROPHY OF THYROID: ICD-10-CM

## 2025-08-04 DIAGNOSIS — I11.9 HEART DISEASE, HYPERTENSIVE, BENIGN, WITHOUT HEART FAILURE: ICD-10-CM

## 2025-08-04 DIAGNOSIS — I11.9 BENIGN HYPERTENSIVE HEART DISEASE WITHOUT CONGESTIVE HEART FAILURE: ICD-10-CM

## 2025-08-04 DIAGNOSIS — R94.31 ABNORMAL EKG: ICD-10-CM

## 2025-08-04 DIAGNOSIS — E78.5 DYSLIPIDEMIA: ICD-10-CM

## 2025-08-04 DIAGNOSIS — N18.31 STAGE 3A CHRONIC KIDNEY DISEASE: ICD-10-CM

## 2025-08-04 DIAGNOSIS — M79.89 LEG SWELLING: ICD-10-CM

## 2025-08-04 DIAGNOSIS — I49.9 SUPRAVENTRICULAR DYSRHYTHMIA: ICD-10-CM

## 2025-08-04 DIAGNOSIS — R42 DIZZINESS: ICD-10-CM

## 2025-08-04 PROCEDURE — 1160F RVW MEDS BY RX/DR IN RCRD: CPT | Mod: CPTII,S$GLB,, | Performed by: INTERNAL MEDICINE

## 2025-08-04 PROCEDURE — 3078F DIAST BP <80 MM HG: CPT | Mod: CPTII,S$GLB,, | Performed by: INTERNAL MEDICINE

## 2025-08-04 PROCEDURE — 99214 OFFICE O/P EST MOD 30 MIN: CPT | Mod: S$GLB,,, | Performed by: INTERNAL MEDICINE

## 2025-08-04 PROCEDURE — 1159F MED LIST DOCD IN RCRD: CPT | Mod: CPTII,S$GLB,, | Performed by: INTERNAL MEDICINE

## 2025-08-04 PROCEDURE — 99999 PR PBB SHADOW E&M-EST. PATIENT-LVL II: CPT | Mod: PBBFAC,,, | Performed by: INTERNAL MEDICINE

## 2025-08-04 PROCEDURE — G2211 COMPLEX E/M VISIT ADD ON: HCPCS | Mod: S$GLB,,, | Performed by: INTERNAL MEDICINE

## 2025-08-04 PROCEDURE — 1101F PT FALLS ASSESS-DOCD LE1/YR: CPT | Mod: CPTII,S$GLB,, | Performed by: INTERNAL MEDICINE

## 2025-08-04 PROCEDURE — 3074F SYST BP LT 130 MM HG: CPT | Mod: CPTII,S$GLB,, | Performed by: INTERNAL MEDICINE

## 2025-08-04 PROCEDURE — 3288F FALL RISK ASSESSMENT DOCD: CPT | Mod: CPTII,S$GLB,, | Performed by: INTERNAL MEDICINE

## 2025-08-04 NOTE — PROGRESS NOTES
Subjective:   Patient ID:  Iliana Mariano is a 79 y.o. female who presents for cardiac consult of Dizziness (Patient states she had a episode around fathers day)      Referral by: No referring provider defined for this encounter.     Reason for consult:       HPI  The patient came in today for cardiac consult of Dizziness (Patient states she had a episode around fathers day)      Iliana Mariano is a 79 y.o. female pt with HTN, palpitations, edema, hypothyroidism, HLD presents for CV follow up.     HPI  8/19/2021   A 74 YO FEMALE REFERRED FROM DR DWYER because of recurrent sudden episode of feeling dizziness lightheadedness feeling funny disoriented and had to sit down abort what she is doing could not feel  Her pulse felt racing irregular could not count it. Had a total of 2 episodes. Her ekg today ivcd of the lbbb type. No ischemic changes her ekg is unchanged since 2013. Has been feeling tired lately no nap during the day. She sleeps well at nite no snoring. No tia claudication no chest pain no shortness of breath has feeling of imbalance while walking no focal weakness. Has a cardiolite in 2013 was negative. she is fairly active. Carotid stent pending   Strong fh cad. She was placed on baby asa ec.     9/17/2021  Her efor f/u test results still feels tired . No further dizziness lightheadedness     12/17/2021 Tuesday after thanksgiving she got up felt funny  Lightheaded felt her heart unusual ? Fast . Had another episode last week. She monitored bp it was elevated . Her bp fluctuiated. She was tired . Her rt foot goes numb when she sits down. Has no other issues clinically      1/6/2023   Gets episodes of lightheadedness dizziness feels like she is going to pass out. Her bp fluctuates and her heart rate fluctuates with it has no syncope has no fall no chest pain.    no change in her meds. She drinks 1 cup of coffee in the morning.     5/17/2023     Has some ankle swelling. Her heart rate still variable. Has  no cardiac symptoms she gets tired.  Worse  ankle swelling more with end of day?      3/6/2025   Has no leg swelling   Her bp is elevated has no symptoms cardiac wise she is active. Her bp reached 154/124. Had headache with it had some head heaviness she is compliant with salt  . Symptoms resolve when she takes her meds.   Stays well hydrated.     Has symptomatic htn with headache compliant patient will benefit from adding low dose amlodipine 2.5 mg po every evening     4/14/25  Pt of Dr. Diehl here for follow up eval- added Norvasc low dose QHS   Prior ECHO and stress neg 9/2021    BP and HR stable today. BMI 25 - 147 lbs   Bp dropped too much with Norvasc.   Pulse has been high as 112 in AM.     8/4/25  Vital monitor 4/2025 negative, AVG HR 77 bpm.  Occ has weak spells - has occ labile BP - 117/40 --> 135/80 an hour later, pulse can be 98.   Has more LE edema - has not worn compression stockings   Has a hiatal hernia which occ causes worsening CP/spasms.       Results for orders placed during the hospital encounter of 08/19/21    Echo Saline Bubble? No    Interpretation Summary  · Concentric remodeling and normal systolic function.  · The estimated ejection fraction is 60%.  · Normal left ventricular diastolic function.  · With normal right ventricular systolic function.      Results for orders placed during the hospital encounter of 09/02/21    Nuclear Stress - Cardiology Interpreted    Interpretation Summary    Equivocal myocardial perfusion scan.    There is a mild intensity, fixed defect consistent with either scar in the apical wall(s) or secondary to diaphragm attenuation.    The gated perfusion images showed an ejection fraction of 81% at rest. The gated perfusion images showed an ejection fraction of 85% post stress.    The EKG portion of this study is negative for ischemia.    During stress, rare PVCs are noted.    TID= 1.36      No results found for this or any previous visit.      Cardiac Monitor - 3-15  Day Adult (Cupid Only)  Result Date: 4/29/2025  The patient was monitored for a total of 6d 21h, underlying rhythm is   Sinus.  The minimum heart rate was 53 bpm; the maximum 141 bpm; the average 77   bpm.    0 % of Atrial fibrillation/Atrial flutter with longest episode of 0 ms.  The total burden of AV Block present was 0 % [Complete Heart Block: 0 %;   Advanced (High Grade):  0 %; 2nd Degree, Mobitz II: 0 %; 2nd Degree, Mobitz I: 0 %].  There were 0 pauses, the longest pause was 0 ms at --.  Total count of Ventricular Tachycardia (VT): 0 episode(s). Longest VT: 0 s   on --. Fastest Ventricular Run: -- bpm  on --. Total Count of Ventricular Episodes <100bpm: 0 episode(s). Longest   Ventricular Event <100bpm: 0 s  on --  60 supraventricular episodes were found. Longest SVT Episode 9 beats,   Fastest  bpm  There were a total of 1954 PVCs with 1 morphologies and 0 couplets.   Overall PVC [place] at 0.25 %  There were a total of 0 Other Beats. There were 0 total number of paced   beats.  There were a total of 4470 PSVCs with 1592 couplets. Overall PSVC [place]   at  0.58 %    There is a total of 0 patient events               Past Medical History:   Diagnosis Date    Gastritis     GERD (gastroesophageal reflux disease)     Glaucoma suspect with open angle 5-14-13    Hemorrhoid     Hiatal hernia     Hypertension 6/7/2013    Osteoarthritis 2011    Osteoporosis, unspecified     Other and unspecified hyperlipidemia     Unspecified essential hypertension     Unspecified hypothyroidism     Unspecified vitamin D deficiency        Past Surgical History:   Procedure Laterality Date    COLONOSCOPY N/A 02/10/2017    Procedure: COLONOSCOPY;  Surgeon: Alonzo Waterman MD;  Location: [facility];  Service: Endoscopy;  Laterality: N/A;    HYSTERECTOMY  1990    OOPHORECTOMY  1990    SLT OD  06/06/2013    SLT OS  08/15/2013       Social History[1]    Family History   Problem Relation Name Age of Onset    Glaucoma Mother Madison      Cataracts Mother Madison     Diabetes Mother Madison     Hypertension Mother Madison     Stroke Mother Madison     Cataracts Father      Diabetes Sister Imer     Hypertension Sister Imer     Diabetes Brother Voi     Diabetes Maternal Grandfather Areli     Blindness Maternal Grandfather Areli     Thyroid disease Paternal Grandmother      Ovarian cancer Other Niece     Breast cancer Paternal Aunt      Strabismus Neg Hx      Retinal detachment Neg Hx      Macular degeneration Neg Hx      Amblyopia Neg Hx         Patient's Medications   New Prescriptions    No medications on file   Previous Medications    ASPIRIN (ECOTRIN) 81 MG EC TABLET    Take 81 mg by mouth once daily.    ERGOCALCIFEROL, VITAMIN D2, 1,000 UNIT CAP    Take 1,000 Units by mouth.    KETOCONAZOLE (NIZORAL) 2 % CREAM    Apply topically.    LATANOPROST 0.005 % OPHTHALMIC SOLUTION    Place 1 drop into both eyes every evening.    LEVOTHYROXINE (SYNTHROID) 100 MCG TABLET    TAKE 1 TABLET BEFORE BREAKFAST.    METOPROLOL SUCCINATE (TOPROL-XL) 25 MG 24 HR TABLET    Take 1 tablet (25 mg total) by mouth every evening.    PANTOPRAZOLE (PROTONIX) 40 MG TABLET    TAKE 1 TABLET EVERY DAY    PRAVASTATIN (PRAVACHOL) 40 MG TABLET    TAKE 1 TABLET EVERY DAY    PROLIA 60 MG/ML SYRG        TRIAMTERENE-HYDROCHLOROTHIAZIDE 37.5-25 MG (MAXZIDE-25) 37.5-25 MG PER TABLET    TAKE 1 TABLET EVERY DAY (NEED MD APPOINTMENT)   Modified Medications    No medications on file   Discontinued Medications    No medications on file       Review of Systems   Constitutional: Negative.    HENT: Negative.     Eyes: Negative.    Respiratory: Negative.     Cardiovascular:  Positive for palpitations.   Gastrointestinal: Negative.    Genitourinary: Negative.    Musculoskeletal: Negative.    Skin: Negative.    Neurological:  Positive for dizziness.   Endo/Heme/Allergies: Negative.    Psychiatric/Behavioral: Negative.     All 12 systems otherwise negative.      Wt Readings from Last 3 Encounters:    08/04/25 67.2 kg (148 lb 2.4 oz)   04/14/25 66.9 kg (147 lb 7.8 oz)   03/06/25 67.4 kg (148 lb 7.7 oz)     Temp Readings from Last 3 Encounters:   11/28/23 98.1 °F (36.7 °C)   09/08/23 97.2 °F (36.2 °C)   07/21/23 97.2 °F (36.2 °C)     BP Readings from Last 3 Encounters:   08/04/25 122/70   04/14/25 138/78   03/06/25 138/88     Pulse Readings from Last 3 Encounters:   08/04/25 80   04/14/25 67   03/06/25 88       /70   Pulse 80   Wt 67.2 kg (148 lb 2.4 oz)   LMP  (LMP Unknown)   BMI 25.43 kg/m²     Objective:   Physical Exam  Vitals and nursing note reviewed.   Constitutional:       General: She is not in acute distress.     Appearance: She is well-developed. She is not diaphoretic.   HENT:      Head: Normocephalic and atraumatic.      Nose: Nose normal.   Eyes:      General: No scleral icterus.     Conjunctiva/sclera: Conjunctivae normal.   Neck:      Thyroid: No thyromegaly.      Vascular: No JVD.   Cardiovascular:      Rate and Rhythm: Normal rate and regular rhythm.      Heart sounds: S1 normal and S2 normal. Murmur heard.      No friction rub. No gallop. No S3 or S4 sounds.   Pulmonary:      Effort: Pulmonary effort is normal. No respiratory distress.      Breath sounds: Normal breath sounds. No stridor. No wheezing or rales.   Chest:      Chest wall: No tenderness.   Abdominal:      General: Bowel sounds are normal. There is no distension.      Palpations: Abdomen is soft. There is no mass.      Tenderness: There is no abdominal tenderness. There is no rebound.   Genitourinary:     Comments: Deferred  Musculoskeletal:         General: No tenderness or deformity. Normal range of motion.      Cervical back: Normal range of motion and neck supple.   Lymphadenopathy:      Cervical: No cervical adenopathy.   Skin:     General: Skin is warm and dry.      Coloration: Skin is not pale.      Findings: No erythema or rash.   Neurological:      Mental Status: She is alert and oriented to person, place, and  "time.      Motor: No abnormal muscle tone.      Coordination: Coordination normal.   Psychiatric:         Behavior: Behavior normal.         Thought Content: Thought content normal.         Judgment: Judgment normal.         Lab Results   Component Value Date     07/27/2023    K 3.5 07/27/2023     07/27/2023    CO2 25 07/27/2023    BUN 15 07/27/2023    CREATININE 0.9 07/27/2023    GLU 72 07/27/2023    HGBA1C 5.6 05/17/2024    HGBA1C 5.4 07/27/2023    AST 21 07/27/2023    ALT 17 07/27/2023    ALBUMIN 3.8 07/27/2023    PROT 7.3 07/27/2023    BILITOT 0.5 07/27/2023    WBC 7.77 07/27/2023    HGB 15.0 07/27/2023    HCT 46.1 07/27/2023    MCV 95 07/27/2023     07/27/2023    TSH 0.62 07/02/2025    TSH 2.779 07/27/2023    CHOL 186 07/27/2023    HDL 69 07/27/2023    LDLCALC 101.2 07/27/2023    TRIG 79 07/27/2023         No results found for: "BNP", "INR"       Assessment:      1. Palpitation    2. Dyslipidemia    3. Leg swelling    4. Dizziness    5. Abnormal EKG    6. Stage 3a chronic kidney disease    7. Edema, unspecified type    8. Hypothyroidism due to acquired atrophy of thyroid    9. Heart disease, hypertensive, benign, without heart failure    10. Benign hypertensive heart disease without congestive heart failure    11. Supraventricular dysrhythmia          Plan:     Abnormal ECG, palpitations/ SVT in past   - Prior ECHO and stress neg 9/2021   -change BB to QHS  -Vital monitor 4/2025 negative, AVG HR 77 bpm.     2. HTN, dizziness  - titrate meds  - increase fluids if BP remains low  - stopped Norvasc; take diuretic PRN   - order ECHO    3. Edema, legs  - cont to monitor  - rec compression stockings  - may need vasc eval for venous insuff tx  - order ECHO    4. Hypothyroidism  - cont Synthroid     Follow up with Dr. Diehl    Thank you for allowing me to participate in this patient's care. Please do not hesitate to contact me with any questions or concerns. Consult note has been forwarded to the " referral physician.            [1]   Social History  Tobacco Use    Smoking status: Never    Smokeless tobacco: Never   Substance Use Topics    Alcohol use: No    Drug use: Never

## 2025-08-25 ENCOUNTER — HOSPITAL ENCOUNTER (OUTPATIENT)
Dept: CARDIOLOGY | Facility: HOSPITAL | Age: 80
Discharge: HOME | End: 2025-08-25
Attending: INTERNAL MEDICINE
Payer: MEDICARE

## 2025-08-25 VITALS
HEIGHT: 64 IN | WEIGHT: 148 LBS | DIASTOLIC BLOOD PRESSURE: 78 MMHG | SYSTOLIC BLOOD PRESSURE: 138 MMHG | BODY MASS INDEX: 25.27 KG/M2

## 2025-08-25 DIAGNOSIS — I11.9 HEART DISEASE, HYPERTENSIVE, BENIGN, WITHOUT HEART FAILURE: ICD-10-CM

## 2025-08-25 DIAGNOSIS — R94.31 ABNORMAL EKG: ICD-10-CM

## 2025-08-25 DIAGNOSIS — R42 DIZZINESS: ICD-10-CM

## 2025-08-25 DIAGNOSIS — I49.9 SUPRAVENTRICULAR DYSRHYTHMIA: ICD-10-CM

## 2025-08-25 DIAGNOSIS — R00.2 PALPITATION: ICD-10-CM

## 2025-08-25 DIAGNOSIS — N18.31 STAGE 3A CHRONIC KIDNEY DISEASE: ICD-10-CM

## 2025-08-25 DIAGNOSIS — R60.9 EDEMA, UNSPECIFIED TYPE: ICD-10-CM

## 2025-08-25 DIAGNOSIS — E03.4 HYPOTHYROIDISM DUE TO ACQUIRED ATROPHY OF THYROID: ICD-10-CM

## 2025-08-25 DIAGNOSIS — E78.5 DYSLIPIDEMIA: ICD-10-CM

## 2025-08-25 DIAGNOSIS — I11.9 BENIGN HYPERTENSIVE HEART DISEASE WITHOUT CONGESTIVE HEART FAILURE: ICD-10-CM

## 2025-08-25 DIAGNOSIS — M79.89 LEG SWELLING: ICD-10-CM

## 2025-08-25 LAB
AORTIC ROOT ANNULUS: 2.7 CM
AORTIC SIZE INDEX (SOV): 1.5 CM/M2
AV INDEX (PROSTH): 0.88
AV MEAN GRADIENT: 3 MMHG
AV PEAK GRADIENT: 5 MMHG
AV VALVE AREA BY VELOCITY RATIO: 2.9 CM²
AV VALVE AREA: 2.8 CM²
AV VELOCITY RATIO: 0.91
BSA FOR ECHO PROCEDURE: 1.74 M2
CV ECHO LV RWT: 0.51 CM
DOP CALC AO PEAK VEL: 1.1 M/S
DOP CALC AO VTI: 25.9 CM
DOP CALC LVOT AREA: 3.1 CM2
DOP CALC LVOT DIAMETER: 2 CM
DOP CALC LVOT PEAK VEL: 1 M/S
DOP CALC RVOT PEAK VEL: 0.69 M/S
DOP CALC RVOT VTI: 15.3 CM
DOP CALCLVOT PEAK VEL VTI: 22.9 CM
E WAVE DECELERATION TIME: 169 MSEC
E/A RATIO: 0.81
E/E' RATIO: 13 M/S
ECHO LV POSTERIOR WALL: 0.9 CM (ref 0.6–1.1)
FRACTIONAL SHORTENING: 28.6 % (ref 28–44)
INTERVENTRICULAR SEPTUM: 1 CM (ref 0.6–1.1)
IVRT: 80 MSEC
LA MAJOR: 3.6 CM
LA MINOR: 4 CM
LA WIDTH: 2.8 CM
LEFT ATRIUM AREA SYSTOLIC (APICAL 2 CHAMBER): 13.22 CM2
LEFT ATRIUM AREA SYSTOLIC (APICAL 4 CHAMBER): 11.47 CM2
LEFT ATRIUM SIZE: 2.8 CM
LEFT ATRIUM VOLUME INDEX MOD: 17 ML/M2
LEFT ATRIUM VOLUME INDEX: 15 ML/M2
LEFT ATRIUM VOLUME MOD: 30 ML
LEFT ATRIUM VOLUME: 25 CM3
LEFT INTERNAL DIMENSION IN SYSTOLE: 2.5 CM (ref 2.1–4)
LEFT VENTRICLE DIASTOLIC VOLUME INDEX: 29.07 ML/M2
LEFT VENTRICLE DIASTOLIC VOLUME: 50 ML
LEFT VENTRICLE END SYSTOLIC VOLUME APICAL 2 CHAMBER: 31.2 ML
LEFT VENTRICLE END SYSTOLIC VOLUME APICAL 4 CHAMBER: 26.08 ML
LEFT VENTRICLE MASS INDEX: 55.8 G/M2
LEFT VENTRICLE SYSTOLIC VOLUME INDEX: 12.2 ML/M2
LEFT VENTRICLE SYSTOLIC VOLUME: 21 ML
LEFT VENTRICULAR INTERNAL DIMENSION IN DIASTOLE: 3.5 CM (ref 3.5–6)
LEFT VENTRICULAR MASS: 95.9 G
LV LATERAL E/E' RATIO: 12.5 M/S
LV SEPTAL E/E' RATIO: 12.5 M/S
LVED V (TEICH): 49.67 ML
LVES V (TEICH): 21.16 ML
LVOT MG: 2.2 MMHG
LVOT MV: 0.72 CM/S
Lab: 1.5 CM/M
MV PEAK A VEL: 0.93 M/S
MV PEAK E VEL: 0.75 M/S
OHS CV CPX PATIENT HEIGHT IN: 64
OHS CV RV/LV RATIO: 0.6 CM
PISA TR MAX VEL: 2.6 M/S
PV MEAN GRADIENT: 1 MMHG
PV MV: 0.95 M/S
PV PEAK GRADIENT: 6 MMHG
PV PEAK VELOCITY: 1.22 M/S
RA MAJOR: 4.05 CM
RA PRESSURE ESTIMATED: 3 MMHG
RA WIDTH: 2.52 CM
RIGHT VENTRICLE DIASTOLIC BASEL DIMENSION: 2.1 CM
RIGHT VENTRICULAR END-DIASTOLIC DIMENSION: 2.07 CM
RV TB RVSP: 6 MMHG
SINUS: 2.5 CM
STJ: 2.5 CM
TDI LATERAL: 0.06 M/S
TDI SEPTAL: 0.06 M/S
TDI: 0.06 M/S
TR MAX PG: 31 MMHG
TV REST PULMONARY ARTERY PRESSURE: 30 MMHG
Z-SCORE OF LEFT VENTRICULAR DIMENSION IN END DIASTOLE: -3.1
Z-SCORE OF LEFT VENTRICULAR DIMENSION IN END SYSTOLE: -1.32

## 2025-08-25 PROCEDURE — 93306 TTE W/DOPPLER COMPLETE: CPT | Mod: PO

## 2025-08-25 PROCEDURE — 93306 TTE W/DOPPLER COMPLETE: CPT | Mod: 26,,, | Performed by: INTERNAL MEDICINE
